# Patient Record
Sex: FEMALE | Race: WHITE | NOT HISPANIC OR LATINO | Employment: OTHER | URBAN - METROPOLITAN AREA
[De-identification: names, ages, dates, MRNs, and addresses within clinical notes are randomized per-mention and may not be internally consistent; named-entity substitution may affect disease eponyms.]

---

## 2017-01-13 ENCOUNTER — COMMUNICATION - HEALTHEAST (OUTPATIENT)
Dept: FAMILY MEDICINE | Facility: CLINIC | Age: 52
End: 2017-01-13

## 2017-01-13 DIAGNOSIS — G89.4 CHRONIC PAIN SYNDROME: ICD-10-CM

## 2017-02-09 ENCOUNTER — COMMUNICATION - HEALTHEAST (OUTPATIENT)
Dept: FAMILY MEDICINE | Facility: CLINIC | Age: 52
End: 2017-02-09

## 2017-02-09 DIAGNOSIS — G89.4 CHRONIC PAIN SYNDROME: ICD-10-CM

## 2017-02-18 ENCOUNTER — COMMUNICATION - HEALTHEAST (OUTPATIENT)
Dept: FAMILY MEDICINE | Facility: CLINIC | Age: 52
End: 2017-02-18

## 2017-02-18 DIAGNOSIS — R33.9 URINARY RETENTION: ICD-10-CM

## 2017-02-23 ENCOUNTER — COMMUNICATION - HEALTHEAST (OUTPATIENT)
Dept: FAMILY MEDICINE | Facility: CLINIC | Age: 52
End: 2017-02-23

## 2017-03-03 ENCOUNTER — COMMUNICATION - HEALTHEAST (OUTPATIENT)
Dept: FAMILY MEDICINE | Facility: CLINIC | Age: 52
End: 2017-03-03

## 2017-03-03 DIAGNOSIS — G89.4 CHRONIC PAIN SYNDROME: ICD-10-CM

## 2017-03-03 DIAGNOSIS — E03.9 HYPOTHYROIDISM, UNSPECIFIED TYPE: ICD-10-CM

## 2017-03-31 ENCOUNTER — COMMUNICATION - HEALTHEAST (OUTPATIENT)
Dept: FAMILY MEDICINE | Facility: CLINIC | Age: 52
End: 2017-03-31

## 2017-03-31 DIAGNOSIS — G89.4 CHRONIC PAIN SYNDROME: ICD-10-CM

## 2017-04-03 ENCOUNTER — COMMUNICATION - HEALTHEAST (OUTPATIENT)
Dept: FAMILY MEDICINE | Facility: CLINIC | Age: 52
End: 2017-04-03

## 2017-05-02 ENCOUNTER — OFFICE VISIT - HEALTHEAST (OUTPATIENT)
Dept: FAMILY MEDICINE | Facility: CLINIC | Age: 52
End: 2017-05-02

## 2017-05-02 ENCOUNTER — AMBULATORY - HEALTHEAST (OUTPATIENT)
Dept: FAMILY MEDICINE | Facility: CLINIC | Age: 52
End: 2017-05-02

## 2017-05-02 DIAGNOSIS — G89.4 CHRONIC PAIN SYNDROME: ICD-10-CM

## 2017-05-02 DIAGNOSIS — F32.0 MILD SINGLE CURRENT EPISODE OF MAJOR DEPRESSIVE DISORDER (H): ICD-10-CM

## 2017-05-02 DIAGNOSIS — N39.0 URINARY TRACT INFECTION, SITE NOT SPECIFIED: ICD-10-CM

## 2017-05-02 DIAGNOSIS — E03.9 HYPOTHYROIDISM, UNSPECIFIED TYPE: ICD-10-CM

## 2017-05-02 DIAGNOSIS — F41.9 ANXIETY: ICD-10-CM

## 2017-05-29 ENCOUNTER — COMMUNICATION - HEALTHEAST (OUTPATIENT)
Dept: FAMILY MEDICINE | Facility: CLINIC | Age: 52
End: 2017-05-29

## 2017-05-29 DIAGNOSIS — G89.4 CHRONIC PAIN SYNDROME: ICD-10-CM

## 2017-05-31 ENCOUNTER — COMMUNICATION - HEALTHEAST (OUTPATIENT)
Dept: FAMILY MEDICINE | Facility: CLINIC | Age: 52
End: 2017-05-31

## 2017-06-04 ENCOUNTER — COMMUNICATION - HEALTHEAST (OUTPATIENT)
Dept: FAMILY MEDICINE | Facility: CLINIC | Age: 52
End: 2017-06-04

## 2017-06-23 ENCOUNTER — COMMUNICATION - HEALTHEAST (OUTPATIENT)
Dept: FAMILY MEDICINE | Facility: CLINIC | Age: 52
End: 2017-06-23

## 2017-06-23 DIAGNOSIS — G89.4 CHRONIC PAIN SYNDROME: ICD-10-CM

## 2017-07-25 ENCOUNTER — COMMUNICATION - HEALTHEAST (OUTPATIENT)
Dept: FAMILY MEDICINE | Facility: CLINIC | Age: 52
End: 2017-07-25

## 2017-07-25 DIAGNOSIS — G89.4 CHRONIC PAIN SYNDROME: ICD-10-CM

## 2017-08-22 ENCOUNTER — COMMUNICATION - HEALTHEAST (OUTPATIENT)
Dept: FAMILY MEDICINE | Facility: CLINIC | Age: 52
End: 2017-08-22

## 2017-08-22 DIAGNOSIS — G89.4 CHRONIC PAIN SYNDROME: ICD-10-CM

## 2017-09-14 ENCOUNTER — COMMUNICATION - HEALTHEAST (OUTPATIENT)
Dept: FAMILY MEDICINE | Facility: CLINIC | Age: 52
End: 2017-09-14

## 2017-09-14 DIAGNOSIS — R33.9 URINARY RETENTION: ICD-10-CM

## 2017-09-19 ENCOUNTER — COMMUNICATION - HEALTHEAST (OUTPATIENT)
Dept: FAMILY MEDICINE | Facility: CLINIC | Age: 52
End: 2017-09-19

## 2017-09-19 DIAGNOSIS — G89.4 CHRONIC PAIN SYNDROME: ICD-10-CM

## 2017-10-17 ENCOUNTER — COMMUNICATION - HEALTHEAST (OUTPATIENT)
Dept: FAMILY MEDICINE | Facility: CLINIC | Age: 52
End: 2017-10-17

## 2017-10-17 DIAGNOSIS — G89.4 CHRONIC PAIN SYNDROME: ICD-10-CM

## 2017-11-01 ENCOUNTER — COMMUNICATION - HEALTHEAST (OUTPATIENT)
Dept: FAMILY MEDICINE | Facility: CLINIC | Age: 52
End: 2017-11-01

## 2017-11-01 DIAGNOSIS — E03.9 HYPOTHYROIDISM, UNSPECIFIED TYPE: ICD-10-CM

## 2017-11-01 DIAGNOSIS — E03.9 HYPOTHYROIDISM: ICD-10-CM

## 2017-11-14 ENCOUNTER — COMMUNICATION - HEALTHEAST (OUTPATIENT)
Dept: FAMILY MEDICINE | Facility: CLINIC | Age: 52
End: 2017-11-14

## 2017-11-14 DIAGNOSIS — G89.4 CHRONIC PAIN SYNDROME: ICD-10-CM

## 2017-11-25 ENCOUNTER — COMMUNICATION - HEALTHEAST (OUTPATIENT)
Dept: FAMILY MEDICINE | Facility: CLINIC | Age: 52
End: 2017-11-25

## 2017-11-25 DIAGNOSIS — F32.A DEPRESSION: ICD-10-CM

## 2017-12-08 ENCOUNTER — OFFICE VISIT - HEALTHEAST (OUTPATIENT)
Dept: FAMILY MEDICINE | Facility: CLINIC | Age: 52
End: 2017-12-08

## 2017-12-08 DIAGNOSIS — Z12.31 VISIT FOR SCREENING MAMMOGRAM: ICD-10-CM

## 2017-12-08 DIAGNOSIS — M25.571 RIGHT ANKLE PAIN: ICD-10-CM

## 2017-12-08 DIAGNOSIS — G89.4 CHRONIC PAIN SYNDROME: ICD-10-CM

## 2017-12-08 DIAGNOSIS — G82.20 PARAPLEGIA (H): ICD-10-CM

## 2017-12-08 DIAGNOSIS — F41.9 ANXIETY: ICD-10-CM

## 2017-12-08 DIAGNOSIS — Z12.11 SCREEN FOR COLON CANCER: ICD-10-CM

## 2017-12-08 DIAGNOSIS — E03.9 HYPOTHYROIDISM, UNSPECIFIED TYPE: ICD-10-CM

## 2017-12-30 ENCOUNTER — COMMUNICATION - HEALTHEAST (OUTPATIENT)
Dept: RADIOLOGY | Facility: CLINIC | Age: 52
End: 2017-12-30

## 2018-01-03 ENCOUNTER — COMMUNICATION - HEALTHEAST (OUTPATIENT)
Dept: FAMILY MEDICINE | Facility: CLINIC | Age: 53
End: 2018-01-03

## 2018-01-09 ENCOUNTER — COMMUNICATION - HEALTHEAST (OUTPATIENT)
Dept: FAMILY MEDICINE | Facility: CLINIC | Age: 53
End: 2018-01-09

## 2018-01-09 DIAGNOSIS — G89.4 CHRONIC PAIN SYNDROME: ICD-10-CM

## 2018-02-06 ENCOUNTER — COMMUNICATION - HEALTHEAST (OUTPATIENT)
Dept: FAMILY MEDICINE | Facility: CLINIC | Age: 53
End: 2018-02-06

## 2018-02-06 DIAGNOSIS — G89.4 CHRONIC PAIN SYNDROME: ICD-10-CM

## 2018-03-06 ENCOUNTER — COMMUNICATION - HEALTHEAST (OUTPATIENT)
Dept: FAMILY MEDICINE | Facility: CLINIC | Age: 53
End: 2018-03-06

## 2018-03-06 DIAGNOSIS — G89.4 CHRONIC PAIN SYNDROME: ICD-10-CM

## 2018-03-20 ENCOUNTER — COMMUNICATION - HEALTHEAST (OUTPATIENT)
Dept: FAMILY MEDICINE | Facility: CLINIC | Age: 53
End: 2018-03-20

## 2018-03-20 DIAGNOSIS — R33.9 URINARY RETENTION: ICD-10-CM

## 2018-04-04 ENCOUNTER — COMMUNICATION - HEALTHEAST (OUTPATIENT)
Dept: FAMILY MEDICINE | Facility: CLINIC | Age: 53
End: 2018-04-04

## 2018-04-04 DIAGNOSIS — G89.4 CHRONIC PAIN SYNDROME: ICD-10-CM

## 2018-04-23 ENCOUNTER — COMMUNICATION - HEALTHEAST (OUTPATIENT)
Dept: FAMILY MEDICINE | Facility: CLINIC | Age: 53
End: 2018-04-23

## 2018-04-23 DIAGNOSIS — F32.A DEPRESSION: ICD-10-CM

## 2018-04-27 ENCOUNTER — COMMUNICATION - HEALTHEAST (OUTPATIENT)
Dept: FAMILY MEDICINE | Facility: CLINIC | Age: 53
End: 2018-04-27

## 2018-04-27 DIAGNOSIS — G89.4 CHRONIC PAIN SYNDROME: ICD-10-CM

## 2018-05-04 ENCOUNTER — COMMUNICATION - HEALTHEAST (OUTPATIENT)
Dept: FAMILY MEDICINE | Facility: CLINIC | Age: 53
End: 2018-05-04

## 2018-05-04 DIAGNOSIS — E03.9 HYPOTHYROIDISM, UNSPECIFIED TYPE: ICD-10-CM

## 2018-05-25 ENCOUNTER — COMMUNICATION - HEALTHEAST (OUTPATIENT)
Dept: FAMILY MEDICINE | Facility: CLINIC | Age: 53
End: 2018-05-25

## 2018-05-25 DIAGNOSIS — G89.4 CHRONIC PAIN SYNDROME: ICD-10-CM

## 2018-06-12 ENCOUNTER — COMMUNICATION - HEALTHEAST (OUTPATIENT)
Dept: FAMILY MEDICINE | Facility: CLINIC | Age: 53
End: 2018-06-12

## 2018-06-22 ENCOUNTER — COMMUNICATION - HEALTHEAST (OUTPATIENT)
Dept: FAMILY MEDICINE | Facility: CLINIC | Age: 53
End: 2018-06-22

## 2018-06-22 DIAGNOSIS — G89.4 CHRONIC PAIN SYNDROME: ICD-10-CM

## 2018-07-09 ENCOUNTER — COMMUNICATION - HEALTHEAST (OUTPATIENT)
Dept: SCHEDULING | Facility: CLINIC | Age: 53
End: 2018-07-09

## 2018-07-11 ENCOUNTER — RECORDS - HEALTHEAST (OUTPATIENT)
Dept: ADMINISTRATIVE | Facility: OTHER | Age: 53
End: 2018-07-11

## 2018-07-17 ENCOUNTER — COMMUNICATION - HEALTHEAST (OUTPATIENT)
Dept: FAMILY MEDICINE | Facility: CLINIC | Age: 53
End: 2018-07-17

## 2018-07-19 ENCOUNTER — COMMUNICATION - HEALTHEAST (OUTPATIENT)
Dept: FAMILY MEDICINE | Facility: CLINIC | Age: 53
End: 2018-07-19

## 2018-07-19 DIAGNOSIS — F32.A DEPRESSION: ICD-10-CM

## 2018-07-24 ENCOUNTER — OFFICE VISIT - HEALTHEAST (OUTPATIENT)
Dept: FAMILY MEDICINE | Facility: CLINIC | Age: 53
End: 2018-07-24

## 2018-07-24 DIAGNOSIS — M80.00XA AGE-RELATED OSTEOPOROSIS WITH CURRENT PATHOLOGICAL FRACTURE: ICD-10-CM

## 2018-07-24 DIAGNOSIS — S82.141D TIBIAL PLATEAU FRACTURE, RIGHT, CLOSED, WITH ROUTINE HEALING, SUBSEQUENT ENCOUNTER: ICD-10-CM

## 2018-07-24 DIAGNOSIS — G89.4 CHRONIC PAIN SYNDROME: ICD-10-CM

## 2018-07-24 DIAGNOSIS — S72.92XA FEMUR FRACTURE, LEFT (H): ICD-10-CM

## 2018-07-24 LAB
AMPHETAMINES UR QL SCN: ABNORMAL
BARBITURATES UR QL: ABNORMAL
BENZODIAZ UR QL: ABNORMAL
CANNABINOIDS UR QL SCN: ABNORMAL
COCAINE UR QL: ABNORMAL
CREAT UR-MCNC: 108.1 MG/DL
METHADONE UR QL SCN: ABNORMAL
OPIATES UR QL SCN: ABNORMAL
OXYCODONE UR QL: ABNORMAL
PCP UR QL SCN: ABNORMAL

## 2018-07-26 ENCOUNTER — COMMUNICATION - HEALTHEAST (OUTPATIENT)
Dept: FAMILY MEDICINE | Facility: CLINIC | Age: 53
End: 2018-07-26

## 2018-07-26 DIAGNOSIS — F32.0 MILD SINGLE CURRENT EPISODE OF MAJOR DEPRESSIVE DISORDER (H): ICD-10-CM

## 2018-08-08 ENCOUNTER — COMMUNICATION - HEALTHEAST (OUTPATIENT)
Dept: FAMILY MEDICINE | Facility: CLINIC | Age: 53
End: 2018-08-08

## 2018-08-08 DIAGNOSIS — E03.9 HYPOTHYROIDISM, UNSPECIFIED TYPE: ICD-10-CM

## 2018-08-08 DIAGNOSIS — S72.92XA FEMUR FRACTURE, LEFT (H): ICD-10-CM

## 2018-08-08 DIAGNOSIS — G89.4 CHRONIC PAIN SYNDROME: ICD-10-CM

## 2018-08-08 DIAGNOSIS — S82.141D TIBIAL PLATEAU FRACTURE, RIGHT, CLOSED, WITH ROUTINE HEALING, SUBSEQUENT ENCOUNTER: ICD-10-CM

## 2018-08-21 ENCOUNTER — COMMUNICATION - HEALTHEAST (OUTPATIENT)
Dept: FAMILY MEDICINE | Facility: CLINIC | Age: 53
End: 2018-08-21

## 2018-08-21 DIAGNOSIS — G89.4 CHRONIC PAIN SYNDROME: ICD-10-CM

## 2018-08-24 ENCOUNTER — COMMUNICATION - HEALTHEAST (OUTPATIENT)
Dept: FAMILY MEDICINE | Facility: CLINIC | Age: 53
End: 2018-08-24

## 2018-08-24 DIAGNOSIS — G89.4 CHRONIC PAIN SYNDROME: ICD-10-CM

## 2018-08-24 DIAGNOSIS — S72.92XA FEMUR FRACTURE, LEFT (H): ICD-10-CM

## 2018-08-24 DIAGNOSIS — S82.141D TIBIAL PLATEAU FRACTURE, RIGHT, CLOSED, WITH ROUTINE HEALING, SUBSEQUENT ENCOUNTER: ICD-10-CM

## 2018-09-18 ENCOUNTER — COMMUNICATION - HEALTHEAST (OUTPATIENT)
Dept: FAMILY MEDICINE | Facility: CLINIC | Age: 53
End: 2018-09-18

## 2018-09-18 DIAGNOSIS — S82.141D TIBIAL PLATEAU FRACTURE, RIGHT, CLOSED, WITH ROUTINE HEALING, SUBSEQUENT ENCOUNTER: ICD-10-CM

## 2018-09-18 DIAGNOSIS — G89.4 CHRONIC PAIN SYNDROME: ICD-10-CM

## 2018-09-18 DIAGNOSIS — S72.92XA FEMUR FRACTURE, LEFT (H): ICD-10-CM

## 2018-09-19 ENCOUNTER — COMMUNICATION - HEALTHEAST (OUTPATIENT)
Dept: FAMILY MEDICINE | Facility: CLINIC | Age: 53
End: 2018-09-19

## 2018-09-19 DIAGNOSIS — S82.141D TIBIAL PLATEAU FRACTURE, RIGHT, CLOSED, WITH ROUTINE HEALING, SUBSEQUENT ENCOUNTER: ICD-10-CM

## 2018-09-19 DIAGNOSIS — S72.92XA FEMUR FRACTURE, LEFT (H): ICD-10-CM

## 2018-09-19 DIAGNOSIS — G89.4 CHRONIC PAIN SYNDROME: ICD-10-CM

## 2018-10-16 ENCOUNTER — COMMUNICATION - HEALTHEAST (OUTPATIENT)
Dept: FAMILY MEDICINE | Facility: CLINIC | Age: 53
End: 2018-10-16

## 2018-10-16 DIAGNOSIS — G89.4 CHRONIC PAIN SYNDROME: ICD-10-CM

## 2018-10-16 DIAGNOSIS — F32.A DEPRESSION: ICD-10-CM

## 2018-10-22 ENCOUNTER — COMMUNICATION - HEALTHEAST (OUTPATIENT)
Dept: FAMILY MEDICINE | Facility: CLINIC | Age: 53
End: 2018-10-22

## 2018-10-22 DIAGNOSIS — G89.4 CHRONIC PAIN SYNDROME: ICD-10-CM

## 2018-11-04 ENCOUNTER — COMMUNICATION - HEALTHEAST (OUTPATIENT)
Dept: FAMILY MEDICINE | Facility: CLINIC | Age: 53
End: 2018-11-04

## 2018-11-04 DIAGNOSIS — E03.9 HYPOTHYROIDISM, UNSPECIFIED TYPE: ICD-10-CM

## 2018-11-20 ENCOUNTER — COMMUNICATION - HEALTHEAST (OUTPATIENT)
Dept: FAMILY MEDICINE | Facility: CLINIC | Age: 53
End: 2018-11-20

## 2018-11-20 DIAGNOSIS — G89.4 CHRONIC PAIN SYNDROME: ICD-10-CM

## 2018-12-12 ENCOUNTER — OFFICE VISIT - HEALTHEAST (OUTPATIENT)
Dept: FAMILY MEDICINE | Facility: CLINIC | Age: 53
End: 2018-12-12

## 2018-12-12 DIAGNOSIS — D64.9 NORMOCHROMIC NORMOCYTIC ANEMIA: ICD-10-CM

## 2018-12-12 DIAGNOSIS — G89.4 CHRONIC PAIN SYNDROME: ICD-10-CM

## 2018-12-12 DIAGNOSIS — E87.6 HYPOKALEMIA: ICD-10-CM

## 2018-12-12 DIAGNOSIS — F32.0 CURRENT MILD EPISODE OF MAJOR DEPRESSIVE DISORDER WITHOUT PRIOR EPISODE (H): ICD-10-CM

## 2018-12-12 DIAGNOSIS — G82.20 PARAPLEGIA (H): ICD-10-CM

## 2018-12-12 DIAGNOSIS — Z12.11 SCREEN FOR COLON CANCER: ICD-10-CM

## 2018-12-12 DIAGNOSIS — E03.9 HYPOTHYROIDISM, UNSPECIFIED TYPE: ICD-10-CM

## 2018-12-12 DIAGNOSIS — Z12.31 VISIT FOR SCREENING MAMMOGRAM: ICD-10-CM

## 2018-12-12 LAB
ERYTHROCYTE [DISTWIDTH] IN BLOOD BY AUTOMATED COUNT: 13.8 % (ref 11–14.5)
HCT VFR BLD AUTO: 35.3 % (ref 35–47)
HGB BLD-MCNC: 12.1 G/DL (ref 12–16)
MCH RBC QN AUTO: 31.2 PG (ref 27–34)
MCHC RBC AUTO-ENTMCNC: 34.3 G/DL (ref 32–36)
MCV RBC AUTO: 91 FL (ref 80–100)
PLATELET # BLD AUTO: 293 THOU/UL (ref 140–440)
PMV BLD AUTO: 8.2 FL (ref 7–10)
RBC # BLD AUTO: 3.89 MILL/UL (ref 3.8–5.4)
WBC: 4.4 THOU/UL (ref 4–11)

## 2018-12-13 LAB
ANION GAP SERPL CALCULATED.3IONS-SCNC: 8 MMOL/L (ref 5–18)
BUN SERPL-MCNC: 15 MG/DL (ref 8–22)
CALCIUM SERPL-MCNC: 9.6 MG/DL (ref 8.5–10.5)
CHLORIDE BLD-SCNC: 105 MMOL/L (ref 98–107)
CO2 SERPL-SCNC: 26 MMOL/L (ref 22–31)
CREAT SERPL-MCNC: 0.66 MG/DL (ref 0.6–1.1)
GFR SERPL CREATININE-BSD FRML MDRD: >60 ML/MIN/1.73M2
GLUCOSE BLD-MCNC: 108 MG/DL (ref 70–125)
POTASSIUM BLD-SCNC: 4.7 MMOL/L (ref 3.5–5)
SODIUM SERPL-SCNC: 139 MMOL/L (ref 136–145)
TSH SERPL DL<=0.005 MIU/L-ACNC: 1.21 UIU/ML (ref 0.3–5)

## 2018-12-17 ENCOUNTER — COMMUNICATION - HEALTHEAST (OUTPATIENT)
Dept: FAMILY MEDICINE | Facility: CLINIC | Age: 53
End: 2018-12-17

## 2018-12-17 DIAGNOSIS — G89.4 CHRONIC PAIN SYNDROME: ICD-10-CM

## 2018-12-19 ENCOUNTER — COMMUNICATION - HEALTHEAST (OUTPATIENT)
Dept: RADIOLOGY | Facility: CLINIC | Age: 53
End: 2018-12-19

## 2018-12-28 ENCOUNTER — COMMUNICATION - HEALTHEAST (OUTPATIENT)
Dept: FAMILY MEDICINE | Facility: CLINIC | Age: 53
End: 2018-12-28

## 2018-12-28 DIAGNOSIS — R33.9 URINARY RETENTION: ICD-10-CM

## 2019-01-02 ENCOUNTER — COMMUNICATION - HEALTHEAST (OUTPATIENT)
Dept: FAMILY MEDICINE | Facility: CLINIC | Age: 54
End: 2019-01-02

## 2019-01-14 ENCOUNTER — COMMUNICATION - HEALTHEAST (OUTPATIENT)
Dept: FAMILY MEDICINE | Facility: CLINIC | Age: 54
End: 2019-01-14

## 2019-01-14 DIAGNOSIS — F32.0 CURRENT MILD EPISODE OF MAJOR DEPRESSIVE DISORDER WITHOUT PRIOR EPISODE (H): ICD-10-CM

## 2019-01-14 DIAGNOSIS — G89.4 CHRONIC PAIN SYNDROME: ICD-10-CM

## 2019-02-06 ENCOUNTER — COMMUNICATION - HEALTHEAST (OUTPATIENT)
Dept: FAMILY MEDICINE | Facility: CLINIC | Age: 54
End: 2019-02-06

## 2019-02-06 DIAGNOSIS — E03.9 HYPOTHYROIDISM, UNSPECIFIED TYPE: ICD-10-CM

## 2019-02-11 ENCOUNTER — COMMUNICATION - HEALTHEAST (OUTPATIENT)
Dept: FAMILY MEDICINE | Facility: CLINIC | Age: 54
End: 2019-02-11

## 2019-02-11 DIAGNOSIS — G89.4 CHRONIC PAIN SYNDROME: ICD-10-CM

## 2019-02-12 ENCOUNTER — COMMUNICATION - HEALTHEAST (OUTPATIENT)
Dept: FAMILY MEDICINE | Facility: CLINIC | Age: 54
End: 2019-02-12

## 2019-02-13 ENCOUNTER — COMMUNICATION - HEALTHEAST (OUTPATIENT)
Dept: FAMILY MEDICINE | Facility: CLINIC | Age: 54
End: 2019-02-13

## 2019-03-11 ENCOUNTER — COMMUNICATION - HEALTHEAST (OUTPATIENT)
Dept: FAMILY MEDICINE | Facility: CLINIC | Age: 54
End: 2019-03-11

## 2019-03-11 DIAGNOSIS — G89.4 CHRONIC PAIN SYNDROME: ICD-10-CM

## 2019-04-08 ENCOUNTER — COMMUNICATION - HEALTHEAST (OUTPATIENT)
Dept: FAMILY MEDICINE | Facility: CLINIC | Age: 54
End: 2019-04-08

## 2019-04-08 DIAGNOSIS — G89.4 CHRONIC PAIN SYNDROME: ICD-10-CM

## 2019-05-06 ENCOUNTER — COMMUNICATION - HEALTHEAST (OUTPATIENT)
Dept: FAMILY MEDICINE | Facility: CLINIC | Age: 54
End: 2019-05-06

## 2019-05-06 DIAGNOSIS — G89.4 CHRONIC PAIN SYNDROME: ICD-10-CM

## 2019-06-03 ENCOUNTER — COMMUNICATION - HEALTHEAST (OUTPATIENT)
Dept: FAMILY MEDICINE | Facility: CLINIC | Age: 54
End: 2019-06-03

## 2019-06-03 DIAGNOSIS — G89.4 CHRONIC PAIN SYNDROME: ICD-10-CM

## 2019-07-01 ENCOUNTER — COMMUNICATION - HEALTHEAST (OUTPATIENT)
Dept: FAMILY MEDICINE | Facility: CLINIC | Age: 54
End: 2019-07-01

## 2019-07-01 DIAGNOSIS — G89.4 CHRONIC PAIN SYNDROME: ICD-10-CM

## 2019-07-29 ENCOUNTER — COMMUNICATION - HEALTHEAST (OUTPATIENT)
Dept: FAMILY MEDICINE | Facility: CLINIC | Age: 54
End: 2019-07-29

## 2019-07-29 DIAGNOSIS — G89.4 CHRONIC PAIN SYNDROME: ICD-10-CM

## 2019-07-31 ENCOUNTER — COMMUNICATION - HEALTHEAST (OUTPATIENT)
Dept: FAMILY MEDICINE | Facility: CLINIC | Age: 54
End: 2019-07-31

## 2019-07-31 DIAGNOSIS — G89.4 CHRONIC PAIN SYNDROME: ICD-10-CM

## 2019-08-27 ENCOUNTER — COMMUNICATION - HEALTHEAST (OUTPATIENT)
Dept: FAMILY MEDICINE | Facility: CLINIC | Age: 54
End: 2019-08-27

## 2019-08-27 DIAGNOSIS — G89.4 CHRONIC PAIN SYNDROME: ICD-10-CM

## 2019-08-29 ENCOUNTER — COMMUNICATION - HEALTHEAST (OUTPATIENT)
Dept: FAMILY MEDICINE | Facility: CLINIC | Age: 54
End: 2019-08-29

## 2019-09-06 ENCOUNTER — COMMUNICATION - HEALTHEAST (OUTPATIENT)
Dept: FAMILY MEDICINE | Facility: CLINIC | Age: 54
End: 2019-09-06

## 2019-09-06 DIAGNOSIS — G89.4 CHRONIC PAIN SYNDROME: ICD-10-CM

## 2019-09-08 ENCOUNTER — AMBULATORY - HEALTHEAST (OUTPATIENT)
Dept: FAMILY MEDICINE | Facility: CLINIC | Age: 54
End: 2019-09-08

## 2019-09-08 DIAGNOSIS — G89.4 CHRONIC PAIN SYNDROME: ICD-10-CM

## 2019-09-09 ENCOUNTER — COMMUNICATION - HEALTHEAST (OUTPATIENT)
Dept: FAMILY MEDICINE | Facility: CLINIC | Age: 54
End: 2019-09-09

## 2019-09-24 ENCOUNTER — OFFICE VISIT - HEALTHEAST (OUTPATIENT)
Dept: FAMILY MEDICINE | Facility: CLINIC | Age: 54
End: 2019-09-24

## 2019-09-24 DIAGNOSIS — G82.20 PARAPLEGIA (H): ICD-10-CM

## 2019-09-24 DIAGNOSIS — G89.4 CHRONIC PAIN SYNDROME: ICD-10-CM

## 2019-09-24 DIAGNOSIS — F41.9 ANXIETY: ICD-10-CM

## 2019-09-24 DIAGNOSIS — E03.9 HYPOTHYROIDISM, UNSPECIFIED TYPE: ICD-10-CM

## 2019-09-24 DIAGNOSIS — M80.00XD AGE-RELATED OSTEOPOROSIS WITH CURRENT PATHOLOGICAL FRACTURE WITH ROUTINE HEALING, SUBSEQUENT ENCOUNTER: ICD-10-CM

## 2019-09-24 DIAGNOSIS — F32.0 CURRENT MILD EPISODE OF MAJOR DEPRESSIVE DISORDER WITHOUT PRIOR EPISODE (H): ICD-10-CM

## 2019-09-24 ASSESSMENT — PATIENT HEALTH QUESTIONNAIRE - PHQ9: SUM OF ALL RESPONSES TO PHQ QUESTIONS 1-9: 2

## 2019-09-25 ASSESSMENT — ANXIETY QUESTIONNAIRES
3. WORRYING TOO MUCH ABOUT DIFFERENT THINGS: NOT AT ALL
2. NOT BEING ABLE TO STOP OR CONTROL WORRYING: NOT AT ALL
7. FEELING AFRAID AS IF SOMETHING AWFUL MIGHT HAPPEN: NOT AT ALL
1. FEELING NERVOUS, ANXIOUS, OR ON EDGE: NOT AT ALL
GAD7 TOTAL SCORE: 0
4. TROUBLE RELAXING: NOT AT ALL
5. BEING SO RESTLESS THAT IT IS HARD TO SIT STILL: NOT AT ALL
6. BECOMING EASILY ANNOYED OR IRRITABLE: NOT AT ALL
IF YOU CHECKED OFF ANY PROBLEMS ON THIS QUESTIONNAIRE, HOW DIFFICULT HAVE THESE PROBLEMS MADE IT FOR YOU TO DO YOUR WORK, TAKE CARE OF THINGS AT HOME, OR GET ALONG WITH OTHER PEOPLE: NOT DIFFICULT AT ALL

## 2019-10-22 ENCOUNTER — COMMUNICATION - HEALTHEAST (OUTPATIENT)
Dept: FAMILY MEDICINE | Facility: CLINIC | Age: 54
End: 2019-10-22

## 2019-10-22 ENCOUNTER — AMBULATORY - HEALTHEAST (OUTPATIENT)
Dept: FAMILY MEDICINE | Facility: CLINIC | Age: 54
End: 2019-10-22

## 2019-10-22 DIAGNOSIS — G89.4 CHRONIC PAIN SYNDROME: ICD-10-CM

## 2019-11-12 ENCOUNTER — COMMUNICATION - HEALTHEAST (OUTPATIENT)
Dept: FAMILY MEDICINE | Facility: CLINIC | Age: 54
End: 2019-11-12

## 2019-11-19 ENCOUNTER — COMMUNICATION - HEALTHEAST (OUTPATIENT)
Dept: FAMILY MEDICINE | Facility: CLINIC | Age: 54
End: 2019-11-19

## 2019-11-19 DIAGNOSIS — G89.4 CHRONIC PAIN SYNDROME: ICD-10-CM

## 2019-12-12 ENCOUNTER — COMMUNICATION - HEALTHEAST (OUTPATIENT)
Dept: FAMILY MEDICINE | Facility: CLINIC | Age: 54
End: 2019-12-12

## 2019-12-12 DIAGNOSIS — F32.0 CURRENT MILD EPISODE OF MAJOR DEPRESSIVE DISORDER WITHOUT PRIOR EPISODE (H): ICD-10-CM

## 2019-12-16 ENCOUNTER — COMMUNICATION - HEALTHEAST (OUTPATIENT)
Dept: FAMILY MEDICINE | Facility: CLINIC | Age: 54
End: 2019-12-16

## 2019-12-16 DIAGNOSIS — G89.4 CHRONIC PAIN SYNDROME: ICD-10-CM

## 2019-12-17 ENCOUNTER — COMMUNICATION - HEALTHEAST (OUTPATIENT)
Dept: RADIOLOGY | Facility: CLINIC | Age: 54
End: 2019-12-17

## 2019-12-17 DIAGNOSIS — Z29.89 NEED FOR PROPHYLAXIS AGAINST URINARY TRACT INFECTION: ICD-10-CM

## 2019-12-17 DIAGNOSIS — N39.0 URINARY TRACT INFECTION WITHOUT HEMATURIA, SITE UNSPECIFIED: ICD-10-CM

## 2019-12-19 ENCOUNTER — COMMUNICATION - HEALTHEAST (OUTPATIENT)
Dept: FAMILY MEDICINE | Facility: CLINIC | Age: 54
End: 2019-12-19

## 2019-12-19 DIAGNOSIS — E03.9 HYPOTHYROIDISM, UNSPECIFIED TYPE: ICD-10-CM

## 2020-01-05 ENCOUNTER — COMMUNICATION - HEALTHEAST (OUTPATIENT)
Dept: FAMILY MEDICINE | Facility: CLINIC | Age: 55
End: 2020-01-05

## 2020-01-05 DIAGNOSIS — R33.9 URINARY RETENTION: ICD-10-CM

## 2020-01-13 ENCOUNTER — COMMUNICATION - HEALTHEAST (OUTPATIENT)
Dept: FAMILY MEDICINE | Facility: CLINIC | Age: 55
End: 2020-01-13

## 2020-01-13 DIAGNOSIS — G89.4 CHRONIC PAIN SYNDROME: ICD-10-CM

## 2020-01-14 ENCOUNTER — COMMUNICATION - HEALTHEAST (OUTPATIENT)
Dept: FAMILY MEDICINE | Facility: CLINIC | Age: 55
End: 2020-01-14

## 2020-01-14 DIAGNOSIS — G89.4 CHRONIC PAIN SYNDROME: ICD-10-CM

## 2020-02-11 ENCOUNTER — COMMUNICATION - HEALTHEAST (OUTPATIENT)
Dept: FAMILY MEDICINE | Facility: CLINIC | Age: 55
End: 2020-02-11

## 2020-02-11 DIAGNOSIS — G89.4 CHRONIC PAIN SYNDROME: ICD-10-CM

## 2020-03-09 ENCOUNTER — COMMUNICATION - HEALTHEAST (OUTPATIENT)
Dept: FAMILY MEDICINE | Facility: CLINIC | Age: 55
End: 2020-03-09

## 2020-03-09 DIAGNOSIS — Z79.2 PROPHYLACTIC ANTIBIOTIC: ICD-10-CM

## 2020-03-09 DIAGNOSIS — G89.4 CHRONIC PAIN SYNDROME: ICD-10-CM

## 2020-04-07 ENCOUNTER — COMMUNICATION - HEALTHEAST (OUTPATIENT)
Dept: FAMILY MEDICINE | Facility: CLINIC | Age: 55
End: 2020-04-07

## 2020-04-07 DIAGNOSIS — G89.4 CHRONIC PAIN SYNDROME: ICD-10-CM

## 2020-04-14 ENCOUNTER — COMMUNICATION - HEALTHEAST (OUTPATIENT)
Dept: FAMILY MEDICINE | Facility: CLINIC | Age: 55
End: 2020-04-14

## 2020-05-04 ENCOUNTER — COMMUNICATION - HEALTHEAST (OUTPATIENT)
Dept: FAMILY MEDICINE | Facility: CLINIC | Age: 55
End: 2020-05-04

## 2020-05-04 DIAGNOSIS — G89.4 CHRONIC PAIN SYNDROME: ICD-10-CM

## 2020-06-01 ENCOUNTER — OFFICE VISIT - HEALTHEAST (OUTPATIENT)
Dept: FAMILY MEDICINE | Facility: CLINIC | Age: 55
End: 2020-06-01

## 2020-06-01 ENCOUNTER — COMMUNICATION - HEALTHEAST (OUTPATIENT)
Dept: FAMILY MEDICINE | Facility: CLINIC | Age: 55
End: 2020-06-01

## 2020-06-01 DIAGNOSIS — G89.4 CHRONIC PAIN SYNDROME: ICD-10-CM

## 2020-06-01 DIAGNOSIS — N39.42 URINARY INCONTINENCE WITHOUT SENSORY AWARENESS: ICD-10-CM

## 2020-06-08 ENCOUNTER — COMMUNICATION - HEALTHEAST (OUTPATIENT)
Dept: FAMILY MEDICINE | Facility: CLINIC | Age: 55
End: 2020-06-08

## 2020-06-09 ENCOUNTER — OFFICE VISIT - HEALTHEAST (OUTPATIENT)
Dept: FAMILY MEDICINE | Facility: CLINIC | Age: 55
End: 2020-06-09

## 2020-06-09 DIAGNOSIS — Z11.9 SCREENING EXAMINATION FOR INFECTIOUS DISEASE: ICD-10-CM

## 2020-06-09 DIAGNOSIS — G89.4 CHRONIC PAIN SYNDROME: ICD-10-CM

## 2020-06-09 DIAGNOSIS — N39.42 URINARY INCONTINENCE WITHOUT SENSORY AWARENESS: ICD-10-CM

## 2020-06-09 DIAGNOSIS — G82.20 PARAPLEGIA (H): ICD-10-CM

## 2020-06-09 ASSESSMENT — ANXIETY QUESTIONNAIRES
7. FEELING AFRAID AS IF SOMETHING AWFUL MIGHT HAPPEN: NOT AT ALL
6. BECOMING EASILY ANNOYED OR IRRITABLE: NOT AT ALL
GAD7 TOTAL SCORE: 0
4. TROUBLE RELAXING: NOT AT ALL
1. FEELING NERVOUS, ANXIOUS, OR ON EDGE: NOT AT ALL
IF YOU CHECKED OFF ANY PROBLEMS ON THIS QUESTIONNAIRE, HOW DIFFICULT HAVE THESE PROBLEMS MADE IT FOR YOU TO DO YOUR WORK, TAKE CARE OF THINGS AT HOME, OR GET ALONG WITH OTHER PEOPLE: NOT DIFFICULT AT ALL
3. WORRYING TOO MUCH ABOUT DIFFERENT THINGS: NOT AT ALL
5. BEING SO RESTLESS THAT IT IS HARD TO SIT STILL: NOT AT ALL
2. NOT BEING ABLE TO STOP OR CONTROL WORRYING: NOT AT ALL

## 2020-06-09 ASSESSMENT — PATIENT HEALTH QUESTIONNAIRE - PHQ9: SUM OF ALL RESPONSES TO PHQ QUESTIONS 1-9: 0

## 2020-06-29 ENCOUNTER — COMMUNICATION - HEALTHEAST (OUTPATIENT)
Dept: FAMILY MEDICINE | Facility: CLINIC | Age: 55
End: 2020-06-29

## 2020-06-29 DIAGNOSIS — G89.4 CHRONIC PAIN SYNDROME: ICD-10-CM

## 2020-07-27 ENCOUNTER — COMMUNICATION - HEALTHEAST (OUTPATIENT)
Dept: FAMILY MEDICINE | Facility: CLINIC | Age: 55
End: 2020-07-27

## 2020-07-27 DIAGNOSIS — G89.4 CHRONIC PAIN SYNDROME: ICD-10-CM

## 2020-08-18 ENCOUNTER — COMMUNICATION - HEALTHEAST (OUTPATIENT)
Dept: FAMILY MEDICINE | Facility: CLINIC | Age: 55
End: 2020-08-18

## 2020-08-18 DIAGNOSIS — G89.4 CHRONIC PAIN SYNDROME: ICD-10-CM

## 2020-08-19 RX ORDER — BACLOFEN 20 MG/1
TABLET ORAL
Qty: 180 TABLET | Refills: 3 | Status: SHIPPED | OUTPATIENT
Start: 2020-08-19 | End: 2021-09-24

## 2020-08-24 ENCOUNTER — COMMUNICATION - HEALTHEAST (OUTPATIENT)
Dept: FAMILY MEDICINE | Facility: CLINIC | Age: 55
End: 2020-08-24

## 2020-08-24 DIAGNOSIS — G89.4 CHRONIC PAIN SYNDROME: ICD-10-CM

## 2020-09-21 ENCOUNTER — COMMUNICATION - HEALTHEAST (OUTPATIENT)
Dept: FAMILY MEDICINE | Facility: CLINIC | Age: 55
End: 2020-09-21

## 2020-09-21 DIAGNOSIS — G89.4 CHRONIC PAIN SYNDROME: ICD-10-CM

## 2020-10-05 ENCOUNTER — COMMUNICATION - HEALTHEAST (OUTPATIENT)
Dept: FAMILY MEDICINE | Facility: CLINIC | Age: 55
End: 2020-10-05

## 2020-10-05 DIAGNOSIS — F32.0 CURRENT MILD EPISODE OF MAJOR DEPRESSIVE DISORDER WITHOUT PRIOR EPISODE (H): ICD-10-CM

## 2020-10-19 ENCOUNTER — COMMUNICATION - HEALTHEAST (OUTPATIENT)
Dept: FAMILY MEDICINE | Facility: CLINIC | Age: 55
End: 2020-10-19

## 2020-10-19 DIAGNOSIS — G89.4 CHRONIC PAIN SYNDROME: ICD-10-CM

## 2020-11-16 ENCOUNTER — COMMUNICATION - HEALTHEAST (OUTPATIENT)
Dept: FAMILY MEDICINE | Facility: CLINIC | Age: 55
End: 2020-11-16

## 2020-11-16 DIAGNOSIS — G89.4 CHRONIC PAIN SYNDROME: ICD-10-CM

## 2020-12-14 ENCOUNTER — COMMUNICATION - HEALTHEAST (OUTPATIENT)
Dept: FAMILY MEDICINE | Facility: CLINIC | Age: 55
End: 2020-12-14

## 2020-12-14 DIAGNOSIS — G89.4 CHRONIC PAIN SYNDROME: ICD-10-CM

## 2020-12-22 ENCOUNTER — COMMUNICATION - HEALTHEAST (OUTPATIENT)
Dept: FAMILY MEDICINE | Facility: CLINIC | Age: 55
End: 2020-12-22

## 2020-12-22 DIAGNOSIS — R33.9 URINARY RETENTION: ICD-10-CM

## 2020-12-22 DIAGNOSIS — Z29.89 NEED FOR PROPHYLAXIS AGAINST URINARY TRACT INFECTION: ICD-10-CM

## 2021-01-11 ENCOUNTER — COMMUNICATION - HEALTHEAST (OUTPATIENT)
Dept: FAMILY MEDICINE | Facility: CLINIC | Age: 56
End: 2021-01-11

## 2021-01-11 DIAGNOSIS — G89.4 CHRONIC PAIN SYNDROME: ICD-10-CM

## 2021-01-12 ENCOUNTER — OFFICE VISIT - HEALTHEAST (OUTPATIENT)
Dept: FAMILY MEDICINE | Facility: CLINIC | Age: 56
End: 2021-01-12

## 2021-01-12 DIAGNOSIS — F41.9 ANXIETY: ICD-10-CM

## 2021-01-12 DIAGNOSIS — G82.20 PARAPLEGIA (H): ICD-10-CM

## 2021-01-12 DIAGNOSIS — F32.0 CURRENT MILD EPISODE OF MAJOR DEPRESSIVE DISORDER WITHOUT PRIOR EPISODE (H): ICD-10-CM

## 2021-01-12 DIAGNOSIS — G89.4 CHRONIC PAIN SYNDROME: ICD-10-CM

## 2021-01-12 ASSESSMENT — ANXIETY QUESTIONNAIRES
IF YOU CHECKED OFF ANY PROBLEMS ON THIS QUESTIONNAIRE, HOW DIFFICULT HAVE THESE PROBLEMS MADE IT FOR YOU TO DO YOUR WORK, TAKE CARE OF THINGS AT HOME, OR GET ALONG WITH OTHER PEOPLE: NOT DIFFICULT AT ALL
4. TROUBLE RELAXING: NOT AT ALL
2. NOT BEING ABLE TO STOP OR CONTROL WORRYING: NOT AT ALL
3. WORRYING TOO MUCH ABOUT DIFFERENT THINGS: NOT AT ALL
6. BECOMING EASILY ANNOYED OR IRRITABLE: NOT AT ALL
GAD7 TOTAL SCORE: 0
1. FEELING NERVOUS, ANXIOUS, OR ON EDGE: NOT AT ALL
7. FEELING AFRAID AS IF SOMETHING AWFUL MIGHT HAPPEN: NOT AT ALL
5. BEING SO RESTLESS THAT IT IS HARD TO SIT STILL: NOT AT ALL

## 2021-01-12 ASSESSMENT — PATIENT HEALTH QUESTIONNAIRE - PHQ9: SUM OF ALL RESPONSES TO PHQ QUESTIONS 1-9: 2

## 2021-01-15 ENCOUNTER — COMMUNICATION - HEALTHEAST (OUTPATIENT)
Dept: SCHEDULING | Facility: CLINIC | Age: 56
End: 2021-01-15

## 2021-02-12 ENCOUNTER — COMMUNICATION - HEALTHEAST (OUTPATIENT)
Dept: FAMILY MEDICINE | Facility: CLINIC | Age: 56
End: 2021-02-12

## 2021-02-12 DIAGNOSIS — G89.4 CHRONIC PAIN SYNDROME: ICD-10-CM

## 2021-03-12 ENCOUNTER — COMMUNICATION - HEALTHEAST (OUTPATIENT)
Dept: FAMILY MEDICINE | Facility: CLINIC | Age: 56
End: 2021-03-12

## 2021-03-12 DIAGNOSIS — G89.4 CHRONIC PAIN SYNDROME: ICD-10-CM

## 2021-03-15 ENCOUNTER — AMBULATORY - HEALTHEAST (OUTPATIENT)
Dept: NURSING | Facility: CLINIC | Age: 56
End: 2021-03-15

## 2021-04-04 ENCOUNTER — COMMUNICATION - HEALTHEAST (OUTPATIENT)
Dept: FAMILY MEDICINE | Facility: CLINIC | Age: 56
End: 2021-04-04

## 2021-04-04 DIAGNOSIS — E03.9 HYPOTHYROIDISM, UNSPECIFIED TYPE: ICD-10-CM

## 2021-04-05 RX ORDER — LEVOTHYROXINE SODIUM 75 UG/1
TABLET ORAL
Qty: 90 TABLET | Refills: 3 | Status: SHIPPED | OUTPATIENT
Start: 2021-04-05 | End: 2022-05-03

## 2021-04-09 ENCOUNTER — AMBULATORY - HEALTHEAST (OUTPATIENT)
Dept: NURSING | Facility: CLINIC | Age: 56
End: 2021-04-09

## 2021-04-09 ENCOUNTER — COMMUNICATION - HEALTHEAST (OUTPATIENT)
Dept: FAMILY MEDICINE | Facility: CLINIC | Age: 56
End: 2021-04-09

## 2021-04-09 DIAGNOSIS — Z23 ENCOUNTER FOR IMMUNIZATION: ICD-10-CM

## 2021-04-09 DIAGNOSIS — G89.4 CHRONIC PAIN SYNDROME: ICD-10-CM

## 2021-04-12 ENCOUNTER — AMBULATORY - HEALTHEAST (OUTPATIENT)
Dept: NURSING | Facility: CLINIC | Age: 56
End: 2021-04-12

## 2021-04-12 DIAGNOSIS — Z23 ENCOUNTER FOR IMMUNIZATION: ICD-10-CM

## 2021-05-06 ENCOUNTER — COMMUNICATION - HEALTHEAST (OUTPATIENT)
Dept: FAMILY MEDICINE | Facility: CLINIC | Age: 56
End: 2021-05-06

## 2021-05-06 DIAGNOSIS — G89.4 CHRONIC PAIN SYNDROME: ICD-10-CM

## 2021-05-26 ENCOUNTER — RECORDS - HEALTHEAST (OUTPATIENT)
Dept: ADMINISTRATIVE | Facility: CLINIC | Age: 56
End: 2021-05-26

## 2021-05-26 VITALS — OXYGEN SATURATION: 99 % | HEART RATE: 94 BPM | DIASTOLIC BLOOD PRESSURE: 70 MMHG | SYSTOLIC BLOOD PRESSURE: 110 MMHG

## 2021-05-26 ASSESSMENT — PATIENT HEALTH QUESTIONNAIRE - PHQ9: SUM OF ALL RESPONSES TO PHQ QUESTIONS 1-9: 2

## 2021-05-27 ENCOUNTER — RECORDS - HEALTHEAST (OUTPATIENT)
Dept: ADMINISTRATIVE | Facility: CLINIC | Age: 56
End: 2021-05-27

## 2021-05-27 ASSESSMENT — PATIENT HEALTH QUESTIONNAIRE - PHQ9
SUM OF ALL RESPONSES TO PHQ QUESTIONS 1-9: 2
SUM OF ALL RESPONSES TO PHQ QUESTIONS 1-9: 0

## 2021-05-27 NOTE — TELEPHONE ENCOUNTER
Controlled Substance Refill Request  Medication Name:   Requested Prescriptions     Pending Prescriptions Disp Refills     oxyCODONE-acetaminophen (PERCOCET/ENDOCET) 5-325 mg per tablet 240 tablet 0     Sig: Take 1-2 tablets by mouth every 6 (six) hours as needed for pain. 1 or 2 every 6 hours ( max of 8 in 24 hours)     fentaNYL (DURAGESIC) 25 mcg/hr 10 patch 0     Sig: APPLY 1 PATCH ON THE SKIN EVERY 3RD DAY     Date Last Fill: 3/11/19  Pharmacy: [facility]      Submit electronically to pharmacy  Controlled Substance Agreement Date Scanned:   Encounter-Level CSA Scan Date - 02/04/2016:    Scan on 2/4/2016: Controlled Substance (below)         Last office visit with prescriber/PCP: 12/12/2018 Brayden Mahmood MD OR same dept: 12/12/2018 Brayden Mahmood MD OR same specialty: 12/12/2018 Brayden Mahmood MD  Last physical: Visit date not found Last MTM visit: Visit date not found

## 2021-05-28 ASSESSMENT — ANXIETY QUESTIONNAIRES
GAD7 TOTAL SCORE: 0

## 2021-05-28 NOTE — TELEPHONE ENCOUNTER
Controlled Substance Refill Request  Medication Name:   Requested Prescriptions     Pending Prescriptions Disp Refills     oxyCODONE-acetaminophen (PERCOCET/ENDOCET) 5-325 mg per tablet 240 tablet 0     Sig: Take 1-2 tablets by mouth every 6 (six) hours as needed for pain. 1 or 2 every 6 hours ( max of 8 in 24 hours)     fentaNYL (DURAGESIC) 25 mcg/hr 10 patch 0     Sig: APPLY 1 PATCH ON THE SKIN EVERY 3RD DAY     Date Last Fill: 4/8/19  Pharmacy: [facility]      Submit electronically to pharmacy  Controlled Substance Agreement Date Scanned:   Encounter-Level CSA Scan Date - 02/04/2016:    Scan on 2/4/2016: Controlled Substance (below)         Last office visit with prescriber/PCP: 12/12/2018 Brayden Mahmood MD OR same dept: 12/12/2018 Brayden Mahmood MD OR same specialty: 12/12/2018 Brayden Mahmood MD  Last physical: Visit date not found Last MTM visit: Visit date not found

## 2021-05-29 NOTE — TELEPHONE ENCOUNTER
Controlled Substance Refill Request  Medication Name:   Requested Prescriptions     Pending Prescriptions Disp Refills     oxyCODONE-acetaminophen (PERCOCET/ENDOCET) 5-325 mg per tablet 240 tablet 0     Sig: Take 1-2 tablets by mouth every 6 (six) hours as needed for pain. 1 or 2 every 6 hours ( max of 8 in 24 hours)     fentaNYL (DURAGESIC) 25 mcg/hr 10 patch 0     Sig: APPLY 1 PATCH ON THE SKIN EVERY 3RD DAY     Date Last Fill: 05/06/19  Pharmacy: [facility] [address] [place] [address] [facility]MELO YAO AT [facility]  Submit electronically to pharmacy  Controlled Substance Agreement Date Scanned:   Encounter-Level CSA Scan Date - 02/04/2016:    Scan on 2/4/2016: Controlled Substance (below)         Last office visit with prescriber/PCP: 12/12/2018 Brayden Mahmood MD OR same dept: 12/12/2018 Brayden Mahmood MD OR same specialty: 12/12/2018 Brayden Mahmood MD  Last physical: Visit date not found Last MTM visit: Visit date not found

## 2021-05-30 ENCOUNTER — RECORDS - HEALTHEAST (OUTPATIENT)
Dept: ADMINISTRATIVE | Facility: CLINIC | Age: 56
End: 2021-05-30

## 2021-05-30 NOTE — TELEPHONE ENCOUNTER
Controlled Substance Refill Request  Medication Name:   Requested Prescriptions     Pending Prescriptions Disp Refills     oxyCODONE-acetaminophen (PERCOCET/ENDOCET) 5-325 mg per tablet 240 tablet 0     Sig: Take 1-2 tablets by mouth every 6 (six) hours as needed for pain. 1 or 2 every 6 hours ( max of 8 in 24 hours)     fentaNYL (DURAGESIC) 25 mcg/hr 10 patch 0     Sig: APPLY 1 PATCH ON THE SKIN EVERY 3RD DAY     Date Last Fill: 6/3/2019   [facility] [address] [place] [address]       Submit electronically to pharmacy  Controlled Substance Agreement Date Scanned:   Encounter-Level CSA Scan Date - 02/04/2016:    Scan on 2/4/2016: Controlled Substance (below)         Last office visit with prescriber/PCP: 12/12/2018 Brayden Mahmood MD OR same dept: 12/12/2018 Brayden Mahmood MD OR same specialty: 12/12/2018 Brayden Mahmood MD  Last physical: Visit date not found Last MTM visit: Visit date not found

## 2021-05-30 NOTE — TELEPHONE ENCOUNTER
Controlled Substance Refill Request  Medication Name:   Requested Prescriptions     Pending Prescriptions Disp Refills     fentaNYL (DURAGESIC) 25 mcg/hr 10 patch 0     Sig: APPLY 1 PATCH ON THE SKIN EVERY 3RD DAY     oxyCODONE-acetaminophen (PERCOCET/ENDOCET) 5-325 mg per tablet 240 tablet 0     Sig: Take 1-2 tablets by mouth every 6 (six) hours as needed for pain. 1 or 2 every 6 hours ( max of 8 in 24 hours)     Date Last Fill: 7/1/19  Pharmacy: [facility]      Submit electronically to pharmacy  Controlled Substance Agreement Date Scanned:   Encounter-Level CSA Scan Date - 02/04/2016:    Scan on 2/4/2016: Controlled Substance (below)         Last office visit with prescriber/PCP: 12/12/2018 Brayden Mahmood MD OR same dept: 12/12/2018 Brayden Mahmood MD OR same specialty: 12/12/2018 Brayden Mahmood MD  Last physical: Visit date not found Last MTM visit: Visit date not found

## 2021-05-31 NOTE — TELEPHONE ENCOUNTER
RN cannot approve Refill Request    RN can NOT refill this medication med is not covered by policy/route to provider. Last office visit: 12/12/2018 Brayden Mahmood MD Last Physical: Visit date not found Last MTM visit: Visit date not found Last visit same specialty: 12/12/2018 Brayden Mahmood MD.  Next visit within 3 mo: Visit date not found  Next physical within 3 mo: Visit date not found  Last refill 6/12/2018 for 180/3  Last OV 12/12/2018    Milka Delgado, [facility] Triage/Med Refill 7/31/2019    Requested Prescriptions   Pending Prescriptions Disp Refills     baclofen (LIORESAL) 20 MG tablet [Pharmacy Med Name: BACLOFEN 20MG TABLETS] 180 tablet 0     Sig: TAKE 1 TABLET(20 MG) BY MOUTH TWICE DAILY       There is no refill protocol information for this order

## 2021-05-31 NOTE — TELEPHONE ENCOUNTER
Controlled Substance Refill Request  Medication Name:   Requested Prescriptions     Pending Prescriptions Disp Refills     fentaNYL (DURAGESIC) 25 mcg/hr 10 patch 0     Sig: APPLY 1 PATCH ON THE SKIN EVERY 3RD DAY     oxyCODONE-acetaminophen (PERCOCET/ENDOCET) 5-325 mg per tablet 240 tablet 0     Sig: Take 1-2 tablets by mouth every 6 (six) hours as needed for pain. 1 or 2 every 6 hours ( max of 8 in 24 hours)     Date Last Fill: both 7/29/2019  [facility]      Submit electronically to pharmacy  Controlled Substance Agreement Date Scanned:   Encounter-Level CSA Scan Date - 02/04/2016:    Scan on 2/4/2016: Controlled Substance (below)         Last office visit with prescriber/PCP: 12/12/2018 Brayden Mahmood MD OR same dept: 12/12/2018 Brayden Mahmood MD OR same specialty: 12/12/2018 Brayden Mahmood MD  Last physical: Visit date not found Last MTM visit: Visit date not found

## 2021-06-01 ENCOUNTER — RECORDS - HEALTHEAST (OUTPATIENT)
Dept: ADMINISTRATIVE | Facility: CLINIC | Age: 56
End: 2021-06-01
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
## 2021-06-01 NOTE — TELEPHONE ENCOUNTER
Central PA team  [phone]  Pool: HE PA MED ([id number])          PA has been initiated.       PA form completed and faxed insurance via Cover My Meds     Key:  [id number]       Medication:  oxyCODONE-acetaminophen (PERCOCET/ENDOCET) 5-325 mg per tablet    Insurance:  [employer]         Response will be received via fax and may take up to 5-10 business days depending on plan

## 2021-06-01 NOTE — TELEPHONE ENCOUNTER
Controlled Substance Refill Request  Medication:   Requested Prescriptions     Pending Prescriptions Disp Refills     fentaNYL (DURAGESIC) 25 mcg/hr 5 patch 0     Sig: APPLY 1 PATCH ON THE SKIN EVERY 3RD DAY     oxyCODONE-acetaminophen (PERCOCET/ENDOCET) 5-325 mg per tablet 120 tablet 0     Sig: Take 1-2 tablets by mouth every 6 (six) hours as needed for pain. 1 or 2 every 6 hours ( max of 8 in 24 hours)     Date Last Fill: 8/27/19  [id number]   Submit electronically to pharmacy  Controlled Substance Agreement on File:   Encounter-Level CSA Scan Date - 02/04/2016:    Scan on 2/4/2016: Controlled Substance (below)         Last office visit: Last office visit pertaining to requested medication was 12/12/18.

## 2021-06-01 NOTE — TELEPHONE ENCOUNTER
Prior Authorization Request  Who s requesting:  Pharmacy  Pharmacy Name and Location: [facility]  Medication Name:   oxyCODONE-acetaminophen (PERCOCET/ENDOCET) 5-325 mg per tablet 120 tablet 0 9/8/2019  No   Sig - Route: Take 1-2 tablets by mouth every 6 (six) hours as needed for pain. 1 or 2 every 6 hours ( max of 8 in 24 hours) - Oral       Insurance Plan: unknown  Insurance Member ID Number:  unknown  Informed patient that prior authorizations can take up to 10 business days for response:   No  Okay to leave a detailed message: Yes    KEY [id number]

## 2021-06-02 NOTE — TELEPHONE ENCOUNTER
Controlled Substance Refill Request  Medication:   Requested Prescriptions     Pending Prescriptions Disp Refills     fentaNYL (DURAGESIC) 25 mcg/hr 10 patch 0     Sig: APPLY 1 PATCH ON THE SKIN EVERY 3RD DAY     oxyCODONE-acetaminophen (PERCOCET/ENDOCET) 5-325 mg per tablet 240 tablet 0     Sig: Take 1-2 tablets by mouth every 6 (six) hours as needed for pain. 1 or 2 every 6 hours ( max of 8 in 24 hours)     Date Last Fill: 9.24.19  [facility]   Submit electronically to pharmacy  Controlled Substance Agreement on File:   Encounter-Level CSA Scan Date - 02/04/2016:    Scan on 2/4/2016: Controlled Substance       Last office visit: Last office visit pertaining to requested medication was 9.24.19.

## 2021-06-03 ENCOUNTER — RECORDS - HEALTHEAST (OUTPATIENT)
Dept: ADMINISTRATIVE | Facility: CLINIC | Age: 56
End: 2021-06-03

## 2021-06-03 NOTE — TELEPHONE ENCOUNTER
Controlled Substance Refill Request  Medication:   Requested Prescriptions     Pending Prescriptions Disp Refills     oxyCODONE-acetaminophen (PERCOCET/ENDOCET) 5-325 mg per tablet 240 tablet 0     Sig: Take 1-2 tablets by mouth every 6 (six) hours as needed for pain. 1 or 2 every 6 hours ( max of 8 in 24 hours)     fentaNYL (DURAGESIC) 25 mcg/hr 10 patch 0     Sig: APPLY 1 PATCH ON THE SKIN EVERY 3RD DAY     Date Last Fill:   fentaNYL (DURAGESIC) 25 mcg/hr 10 patch 0 10/22/2019     oxyCODONE-acetaminophen (PERCOCET/ENDOCET) 5-325 mg per tablet 240 tablet 0 10/22/2019     [facility]    Submit electronically to pharmacy  Controlled Substance Agreement on File:   Encounter-Level CSA Scan Date - 02/04/2016:    Scan on 2/4/2016: Controlled Substance       Last office visit: Last office visit pertaining to requested medication was 9/24/19.

## 2021-06-03 NOTE — TELEPHONE ENCOUNTER
Controlled Substance Refill Request  Medication:   Requested Prescriptions     Pending Prescriptions Disp Refills     oxyCODONE-acetaminophen (PERCOCET/ENDOCET) 5-325 mg per tablet 240 tablet 0     Sig: Take 1-2 tablets by mouth every 6 (six) hours as needed for pain. 1 or 2 every 6 hours ( max of 8 in 24 hours)     Date Last Fill: 10/22/19  [facility]  Submit electronically to pharmacy  Controlled Substance Agreement on File:   Encounter-Level CSA Scan Date - 02/04/2016:    Scan on 2/4/2016: Controlled Substance       Last office visit: Last office visit pertaining to requested medication was 9/24/19.

## 2021-06-04 ENCOUNTER — COMMUNICATION - HEALTHEAST (OUTPATIENT)
Dept: FAMILY MEDICINE | Facility: CLINIC | Age: 56
End: 2021-06-04

## 2021-06-04 DIAGNOSIS — G89.4 CHRONIC PAIN SYNDROME: ICD-10-CM

## 2021-06-04 RX ORDER — FENTANYL 25 UG/1
PATCH TRANSDERMAL
Qty: 10 PATCH | Refills: 0 | Status: SHIPPED | OUTPATIENT
Start: 2021-06-04 | End: 2021-08-19

## 2021-06-04 RX ORDER — OXYCODONE AND ACETAMINOPHEN 5; 325 MG/1; MG/1
1-2 TABLET ORAL EVERY 6 HOURS PRN
Qty: 240 TABLET | Refills: 0 | Status: SHIPPED | OUTPATIENT
Start: 2021-06-04 | End: 2021-08-19

## 2021-06-04 NOTE — TELEPHONE ENCOUNTER
RN cannot approve Refill Request    RN can NOT refill this medication med is not covered by policy/route to provider.     Last office visit: 9/24/2019 Brayden Mahmood MD Last Physical: Visit date not found Last MTM visit: Visit date not found Last visit same specialty: Visit date not found.  Next visit within 3 mo: Visit date not found  Next physical within 3 mo: Visit date not found      Emeka Hernandez, [facility] Connection Triage/Med Refill 12/31/2019    Requested Prescriptions   Pending Prescriptions Disp Refills     nitrofurantoin (MACRODANTIN) 50 MG capsule [Pharmacy Med Name: NITROFURANTOIN MACRO 50MG CAPSULES] 90 capsule 0     Sig: TAKE 1 CAPSULE BY MOUTH EVERY DAY       There is no refill protocol information for this order

## 2021-06-04 NOTE — TELEPHONE ENCOUNTER
Controlled Substance Refill Request  Medication:   Requested Prescriptions     Pending Prescriptions Disp Refills     oxyCODONE-acetaminophen (PERCOCET/ENDOCET) 5-325 mg per tablet 240 tablet 0     Sig: Take 1-2 tablets by mouth every 6 (six) hours as needed for pain. 1 or 2 every 6 hours ( max of 8 in 24 hours)     fentaNYL (DURAGESIC) 25 mcg/hr 10 patch 0     Sig: APPLY 1 PATCH ON THE SKIN EVERY 3RD DAY     Date Last Fill: 11.19.19  [facility]   Submit electronically to pharmacy  Controlled Substance Agreement on File:   Encounter-Level CSA Scan Date - 12/12/2018:    Scan on 12/17/2018  2:22 PM           Encounter-Level CSA Scan Date - 02/04/2016:    Scan on 2/4/2016: Controlled Substance       Last office visit: Last office visit pertaining to requested medication was 9.24.19.

## 2021-06-04 NOTE — TELEPHONE ENCOUNTER
RN cannot approve Refill Request    RN can NOT refill this medication PCP messaged that patient is overdue for Labs. Last office visit: 9/24/2019 Brayden Mahmood MD Last Physical: Visit date not found Last MTM visit: Visit date not found Last visit same specialty: 9/24/2019 Brayden Mahmood MD.  Next visit within 3 mo: Visit date not found  Next physical within 3 mo: Visit date not found      Omari Radford, [facility] Triage/Med Refill 12/22/2019    Requested Prescriptions   Pending Prescriptions Disp Refills     levothyroxine (SYNTHROID, LEVOTHROID) 75 MCG tablet [Pharmacy Med Name: LEVOTHYROXINE 0.075MG (75MCG) TABS] 90 tablet 3     Sig: TAKE 1 TABLET BY MOUTH DAILY AT 6AM       Thyroid Hormones Protocol Failed - 12/19/2019  3:24 AM        Failed - TSH on file in past 12 months for patient age 12 & older     TSH   Date Value Ref Range Status   12/12/2018 1.21 0.30 - 5.00 uIU/mL Final                   Passed - Provider visit in past 12 months or next 3 months     Last office visit with prescriber/PCP: 9/24/2019 Brayden Mahmood MD OR same dept: 9/24/2019 Brayden Mahmood MD OR same specialty: 9/24/2019 Brayden Mahmood MD  Last physical: Visit date not found Last MTM visit: Visit date not found   Next visit within 3 mo: Visit date not found  Next physical within 3 mo: Visit date not found  Prescriber OR PCP: Brayden Mahmood MD  Last diagnosis associated with med order: 1. Hypothyroidism, unspecified type  - levothyroxine (SYNTHROID, LEVOTHROID) 75 MCG tablet [Pharmacy Med Name: LEVOTHYROXINE 0.075MG (75MCG) TABS]; TAKE 1 TABLET BY MOUTH DAILY AT 6AM  Dispense: 90 tablet; Refill: 3    If protocol passes may refill for 12 months if within 3 months of last provider visit (or a total of 15 months).

## 2021-06-04 NOTE — TELEPHONE ENCOUNTER
Refill Approved    Rx renewed per Medication Renewal Policy. Medication was last renewed on 12/12/18.    Tavia Delgado, [facility] Triage/Med Refill 12/14/2019     Requested Prescriptions   Pending Prescriptions Disp Refills     PARoxetine (PAXIL) 40 MG tablet [Pharmacy Med Name: PAROXETINE 40MG TABLETS] 90 tablet 0     Sig: TAKE 1 TABLET(40 MG) BY MOUTH EVERY MORNING       [facility] Refill Protocol  Passed - 12/12/2019  3:24 AM        Passed - PCP or prescribing provider visit in last year     Last office visit with prescriber/PCP: 9/24/2019 Brayden Mahmood MD OR same dept: 9/24/2019 Brayden Mahmood MD OR same specialty: 9/24/2019 Brayden Mahmood MD  Last physical: Visit date not found Last MTM visit: Visit date not found   Next visit within 3 mo: Visit date not found  Next physical within 3 mo: Visit date not found  Prescriber OR PCP: Brayden Mahmood MD  Last diagnosis associated with med order: 1. Current mild episode of major depressive disorder without prior episode (H)  - PARoxetine (PAXIL) 40 MG tablet [Pharmacy Med Name: PAROXETINE 40MG TABLETS]; TAKE 1 TABLET(40 MG) BY MOUTH EVERY MORNING  Dispense: 90 tablet; Refill: 0    If protocol passes may refill for 12 months if within 3 months of last provider visit (or a total of 15 months).

## 2021-06-05 NOTE — TELEPHONE ENCOUNTER
Controlled Substance Refill Request  Medication Name:   Requested Prescriptions     Pending Prescriptions Disp Refills     oxyCODONE-acetaminophen (PERCOCET/ENDOCET) 5-325 mg per tablet 240 tablet 0     Sig: Take 1-2 tablets by mouth every 6 (six) hours as needed for pain. 1 or 2 every 6 hours ( max of 8 in 24 hours)     fentaNYL (DURAGESIC) 25 mcg/hr 10 patch 0     Sig: APPLY 1 PATCH ON THE SKIN EVERY 3RD DAY     Date Last Fill:   oxyCODONE-acetaminophen (PERCOCET/ENDOCET) 5-325 mg per tablet 240 tablet 0 12/16/2019  No   Sig - Route: Take 1-2 tablets by mouth every 6 (six) hours as needed for pain. 1 or 2 every 6 hours ( max of 8 in 24 hours) - Oral   Sent to pharmacy as: oxyCODONE-acetaminophen 5 mg-325 mg tablet (PERCOCET/ENDOCET)   Earliest Fill Date: 12/16/2019   E-Prescribing Status: Receipt confirmed by pharmacy (12/16/2019  3:04 PM CST)     fentaNYL (DURAGESIC) 25 mcg/hr 10 patch 0 12/16/2019  No   Sig: APPLY 1 PATCH ON THE SKIN EVERY 3RD DAY   Sent to pharmacy as: fentaNYL 25 mcg/hr transdermal patch (DURAGESIC)   Earliest Fill Date: 12/16/2019   E-Prescribing Status: Receipt confirmed by pharmacy (12/16/2019  3:04 PM CST)   [facility]ed [facility]: [facility]  Submit electronically to pharmacy  Controlled Substance Agreement on file:   Encounter-Level CSA Scan Date - 12/12/2018:    Scan on 12/17/2018  2:22 PM           Encounter-Level CSA Scan Date - 02/04/2016:    Scan on 2/4/2016: Controlled Substance        Last office visit:  9/24/19

## 2021-06-05 NOTE — TELEPHONE ENCOUNTER
Patient Returning Call  Reason for call:  Patient called back.  Information relayed to patient:  n/a  Patient has additional questions:  Yes  If YES, what are your questions/concerns:  Calling on the status of this message.  Please address today as she is out.  Okay to leave a detailed message?: Yes

## 2021-06-06 NOTE — TELEPHONE ENCOUNTER
Controlled Substance Refill Request  Medication Name:   Requested Prescriptions     Pending Prescriptions Disp Refills     oxyCODONE-acetaminophen (PERCOCET/ENDOCET) 5-325 mg per tablet 240 tablet 0     Sig: Take 1-2 tablets by mouth every 6 (six) hours as needed for pain. 1 or 2 every 6 hours ( max of 8 in 24 hours)     fentaNYL (DURAGESIC) 25 mcg/hr 10 patch 0     Sig: APPLY 1 PATCH ON THE SKIN EVERY 3RD DAY     Date Last Fill: 01/15/2020  Requested Pharmacy: [id number] [place], [place] - [facility] [address] AT [facility]  Submit electronically to pharmacy  Controlled Substance Agreement on file:   Encounter-Level CSA Scan Date - 12/12/2018:    Scan on 12/17/2018  2:22 PM           Encounter-Level CSA Scan Date - 02/04/2016:    Scan on 2/4/2016: Controlled Substance        Last office visit:  09/24/2019

## 2021-06-06 NOTE — TELEPHONE ENCOUNTER
Who is calling:  Patient   Reason for Call:  Patient states one of her house member have flu patient is requesting for preventive Tamiflu . Please advise .  Date of last appointment with primary care: 09/24/19  Okay to leave a detailed message: No

## 2021-06-06 NOTE — TELEPHONE ENCOUNTER
Controlled Substance Refill Request  Medication Name:   Requested Prescriptions     Pending Prescriptions Disp Refills     fentaNYL (DURAGESIC) 25 mcg/hr 10 patch 0     Sig: APPLY 1 PATCH ON THE SKIN EVERY 3RD DAY     oxyCODONE-acetaminophen (PERCOCET/ENDOCET) 5-325 mg per tablet 240 tablet 0     Sig: Take 1-2 tablets by mouth every 6 (six) hours as needed for pain. 1 or 2 every 6 hours ( max of 8 in 24 hours)   Date Last Fill: 02/14/20  Is patient out of medication?: No, 2 days left  Patient notified refills processed within 3 business days:  Yes  [id number]  Submit electronically to pharmacy  Controlled Substance Agreement on file:   Encounter-Level CSA Scan Date - 12/12/2018:    Scan on 12/17/2018  2:22 PM           Encounter-Level CSA Scan Date - 02/04/2016:    Scan on 2/4/2016: Controlled Substance        Last office visit:  Unknown

## 2021-06-06 NOTE — TELEPHONE ENCOUNTER
reviewed - last fill 1/15/2020-okay for refill -per last PCP note, needs follow-up in March 2020    Chloé Goyal MD  [facility]

## 2021-06-07 NOTE — TELEPHONE ENCOUNTER
Controlled Substance Refill Request  Medication Name:   Requested Prescriptions     Pending Prescriptions Disp Refills     oxyCODONE-acetaminophen (PERCOCET/ENDOCET) 5-325 mg per tablet 240 tablet 0     Sig: Take 1-2 tablets by mouth every 6 (six) hours as needed for pain. 1 or 2 every 6 hours ( max of 8 in 24 hours)     fentaNYL (DURAGESIC) 25 mcg/hr 10 patch 0     Sig: APPLY 1 PATCH ON THE SKIN EVERY 3RD DAY     Date Last Fill: 04/08/2020  Requested Pharmacy: [facility] #[id number] [place], [place] - [facility] [address] AT [facility]   Submit electronically to pharmacy  Controlled Substance Agreement on file:   Encounter-Level CSA Scan Date - 12/12/2018:    Scan on 12/17/2018  2:22 PM           Encounter-Level CSA Scan Date - 02/04/2016:    Scan on 2/4/2016: Controlled Substance        Last office visit:  09/24/2020

## 2021-06-07 NOTE — TELEPHONE ENCOUNTER
Controlled Substance Refill Request  Medication Name:   Requested Prescriptions     Pending Prescriptions Disp Refills     fentaNYL (DURAGESIC) 25 mcg/hr 10 patch 0     Sig: APPLY 1 PATCH ON THE SKIN EVERY 3RD DAY     oxyCODONE-acetaminophen (PERCOCET/ENDOCET) 5-325 mg per tablet 240 tablet 0     Sig: Take 1-2 tablets by mouth every 6 (six) hours as needed for pain. 1 or 2 every 6 hours ( max of 8 in 24 hours)     Date Last Fill: 03/09/20  Is patient out of medication?:  Yes  Patient notified refills processed within 3 business days:  Yes  [facility]  Submit electronically to pharmacy  Controlled Substance Agreement on file:   Encounter-Level CSA Scan Date - 12/12/2018:    Scan on 12/17/2018  2:22 PM           Encounter-Level CSA Scan Date - 02/04/2016:    Scan on 2/4/2016: Controlled Substance        Last office visit:    09/24/19

## 2021-06-08 NOTE — TELEPHONE ENCOUNTER
Controlled Substance Refill Request  Medication Name:   Requested Prescriptions     Pending Prescriptions Disp Refills     oxyCODONE-acetaminophen (PERCOCET/ENDOCET) 5-325 mg per tablet 240 tablet 0     Sig: Take 1-2 tablets by mouth every 6 (six) hours as needed for pain. 1 or 2 every 6 hours ( max of 8 in 24 hours)     fentaNYL (DURAGESIC) 25 mcg/hr 10 patch 0     Sig: APPLY 1 PATCH ON THE SKIN EVERY 3RD DAY     Date Last Fill: 05/04/20  [facility]  Submit electronically to pharmacy  Controlled Substance Agreement on file:   Encounter-Level CSA Scan Date - 12/12/2018:    Scan on 12/17/2018  2:22 PM           Encounter-Level CSA Scan Date - 02/04/2016:    Scan on 2/4/2016: Controlled Substance        Last office visit:  09/24/19  Patient also would like an E visit

## 2021-06-08 NOTE — PROGRESS NOTES
Provider E-Visit time total (minutes): 2    Will arrange for virtual visit today in order to further evaluate due to complex PMH.

## 2021-06-08 NOTE — TELEPHONE ENCOUNTER
Who is calling:  The patient   Reason for Call:  The patient states she is calling to speak with Kell. The patient states she had a telephone visit last week but wasn't able to complete because The patient states her phone flew out of her hand and it damaged the phone. The patient has a new phone now.This writer offered to transfer the patient to scheduling but the patient declined and would like a call from Brayden Mahmood MD's team.   Date of last appointment with primary care:   Okay to leave a detailed message: Yes

## 2021-06-08 NOTE — PROGRESS NOTES
"Lyn Russ is a 55 y.o. female who is being evaluated via a billable telephone visit.      The patient has been notified of following:     \"This telephone visit will be conducted via a call between you and your physician/provider. We have found that certain health care needs can be provided without the need for a physical exam.  This service lets us provide the care you need with a short phone conversation.  If a prescription is necessary we can send it directly to your pharmacy.  If lab work is needed we can place an order for that and you can then stop by our lab to have the test done at a later time.    Telephone visits are billed at different rates depending on your insurance coverage. During this emergency period, for some insurers they may be billed the same as an in-person visit.  Please reach out to your insurance provider with any questions.    If during the course of the call the physician/provider feels a telephone visit is not appropriate, you will not be charged for this service.\"    Patient has given verbal consent to a Telephone visit? Yes    What phone number would you like to be contacted at? [phone]    Patient would like to receive their AVS by AVS Preference: Kassandra.    \"Claire\"   Single   1 son - Jed   1 daughter - Breyton (\"Brey\")   1 granddaughter - Dre  Mom - ( 4/22/15 per phone message) Jeff (she is an RN) - h/o R.A.   Dad - Jose   1 older bro (middle sibling) - Moiz   1 younger sis (oldenst child) - Jahaira   Mom -  ( - AAA dissection with multiorgan failure)  No smoke   EtOH: infrequent   Surgeries:  (emergent due to nuchal cord); subsequent    Work: disabled due to spinal cord pain   Self-cath q 4-5 hours (5-6 times per day)   H/O muscle spacticity, urinary symptoms; leg swelling worse with menses   Gets diarrhea with her period (often results in UTI due to hygiene issues with diarrhea)   Hospitalizations: Dec, 1999 (age 34) spinal cord " "hemorrhage  at T7 level (\"couldn't suck stomach in, then subsequent burning)   Procedures: 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 Dasilva at [facility]   2-12-13 PA approved for lyrica 75mg indefinitely    Additional provider notes: Virtual visit completed.  Urinary incontinence worse with transfers.  Patient self catheterizes 5 or 6 times per day.  Does not feel that she has a urinary tract infection and feels like she can tell if cloudy urine, change in odor etc.  Patient with spinal cord hemorrhage age 34 back in 1999 at the T7 level.  Has not seen a urologist in years.  No fever.  Would like Covid-19 antibody testing for illness in February without any residual concerns at this time.  Comprehensive review of systems as above otherwise all negative.        Assessment/Plan:    1. Urinary incontinence without sensory awareness  Urinary incontinence without sensory awareness.  Referred to urology.  Patient continues Ditropan 5 mg using 2 tablets 3 times daily.  Will continue currently until further recommendation from urologist.  Patient describes self catheterizing 5 or 6 times per day and can increase to 6 or 7 times a day in case of overflow incontinence as well.  Continues nitrofurantoin for UTI prophylaxis.  - Ambulatory referral to Urology    2. 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 Chronic pain syndrome  Continues use of fentanyl patch 25 mcg every third day plus oxycodone acetaminophen 5/325 using 1-2 tablets 4 times daily.    4. Screening examination for infectious disease  Patient requesting Covid-19 antibiotic testing.  Order placed.  Patient will schedule for lab only visit.  - COVID-19 Virus Antibody; Future        Phone call duration:  15 minutes    Brayden Mahmood MD  "

## 2021-06-08 NOTE — TELEPHONE ENCOUNTER
Reason contacted:  Appointment   Information relayed:  Helped reschedule patients telephone visit. She unfortunately dropped her phone and it broke therefore was unable to complete her telephone visit that was previously scheduled with Dr. Mahmood.   Additional questions:  No  Further follow-up needed:  No  Okay to leave a detailed message:  No

## 2021-06-09 NOTE — TELEPHONE ENCOUNTER
Controlled Substance Refill Request  Medication Name:   Requested Prescriptions     Pending Prescriptions Disp Refills     oxyCODONE-acetaminophen (PERCOCET/ENDOCET) 5-325 mg per tablet 240 tablet 0     Sig: Take 1-2 tablets by mouth every 6 (six) hours as needed for pain. 1 or 2 every 6 hours ( max of 8 in 24 hours)     fentaNYL (DURAGESIC) 25 mcg/hr 10 patch 0     Sig: APPLY 1 PATCH ON THE SKIN EVERY 3RD DAY     Date Last Fill: 6/1/2020  [facility]  Submit electronically to pharmacy  Controlled Substance Agreement on file:   Encounter-Level CSA Scan Date - 12/12/2018:    Scan on 12/17/2018  2:22 PM           Encounter-Level CSA Scan Date - 02/04/2016:    Scan on 2/4/2016: Controlled Substance        Last office visit:  6/9/20

## 2021-06-10 NOTE — TELEPHONE ENCOUNTER
RN cannot approve Refill Request    RN can NOT refill this medication med is not covered by policy/route to provider. Last office visit: 9/24/2019 Brayden Mahmood MD Last Physical: Visit date not found Last MTM visit: Visit date not found Last visit same specialty: 9/24/2019 Brayden Mahmood MD.  Next visit within 3 mo: Visit date not found  Next physical within 3 mo: Visit date not found      [facility]avia Delgado, [facility] Triage/Med Refill 8/19/2020    Requested Prescriptions   Pending Prescriptions Disp Refills     baclofen (LIORESAL) 20 MG tablet [Pharmacy Med Name: BACLOFEN 20MG TABLETS] 180 tablet 3     Sig: TAKE 1 TABLET(20 MG) BY MOUTH TWICE DAILY       There is no refill protocol information for this order

## 2021-06-10 NOTE — TELEPHONE ENCOUNTER
Controlled Substance Refill Request  Medication Name:   Requested Prescriptions     Pending Prescriptions Disp Refills     fentaNYL (DURAGESIC) 25 mcg/hr 10 patch 0     Sig: APPLY 1 PATCH ON THE SKIN EVERY 3RD DAY     oxyCODONE-acetaminophen (PERCOCET/ENDOCET) 5-325 mg per tablet 240 tablet 0     Sig: Take 1-2 tablets by mouth every 6 (six) hours as needed for pain. 1 or 2 every 6 hours ( max of 8 in 24 hours)     Date Last Fill: 06/29/2020  Requested Pharmacy: [facility] #[id number] [place], [place] - [facility] [address] AT [facility]  Submit electronically to pharmacy  Controlled Substance Agreement on file:   Encounter-Level CSA Scan Date - 12/12/2018:    Scan on 12/17/2018  2:22 PM           Encounter-Level CSA Scan Date - 02/04/2016:    Scan on 2/4/2016: Controlled Substance        Last office visit:  06/09/2020

## 2021-06-10 NOTE — TELEPHONE ENCOUNTER
Controlled Substance Refill Request  Medication Name:   Requested Prescriptions     Pending Prescriptions Disp Refills     oxyCODONE-acetaminophen (PERCOCET/ENDOCET) 5-325 mg per tablet 240 tablet 0     Sig: Take 1-2 tablets by mouth every 6 (six) hours as needed for pain. 1 or 2 every 6 hours ( max of 8 in 24 hours)     fentaNYL (DURAGESIC) 25 mcg/hr 10 patch 0     Sig: APPLY 1 PATCH ON THE SKIN EVERY 3RD DAY     Date Last Fill: 7/27/20  Is patient out of medication?: No, 3 days left  Patient notified refills processed within 3 business days:  Yes  [facility]  Submit electronically to pharmacy  Controlled Substance Agreement on file:   Encounter-Level CSA Scan Date - 12/12/2018:    Scan on 12/17/2018  2:22 PM           Encounter-Level CSA Scan Date - 02/04/2016:    Scan on 2/4/2016: Controlled Substance        Last office visit:  6/9/20

## 2021-06-10 NOTE — PROGRESS NOTES
Assessment:    1. Chronic pain syndrome  oxyCODONE-acetaminophen (PERCOCET) 5-325 mg per tablet    fentaNYL (DURAGESIC) 25 mcg/hr    Drug Abuse 1+, Urine   2. Hypothyroidism, unspecified type  Thyroid Stimulating Hormone (TSH)    Comprehensive Metabolic Panel   3. Anxiety     4. Urinary Tract Infection  Urinalysis-UC if Indicated   5. Mild single current episode of major depressive disorder  PARoxetine (PAXIL) 20 MG tablet    Comprehensive Metabolic Panel         Plan:    40 minutes total time with patient, > 50% with counseling and coordination of cares.  Chronic pain stable.  Refill on fentanyl and oxycodone acetaminophen.  Attempts at collection of urine for UA UC with history recurrent UTI and routine urine drug screen.  Patient states unable to collect urine specimen with short catheter apparently missing urinalysis container with urine emptying into toilet.  Did request urine attempt at collection with a hat however patient did leave without collecting urine specimen.  Dosing of pain medications otherwise remain stable.  Would refer patient to Pain Clinic if requesting further pain medication adjustments.  The check TSH following recent dose adjustment with decrease levothyroxine from 88  g down to 75  g daily.  Comprehensive metabolic panel for medication monitoring.  Continue paroxetine 20 mg at bedtime which she is tolerated better than prior attempts at 30 mg dosing.  Neuro referral if recurrent concerns for memory difficulties, however, patient feels symptoms have resolved.  PHQ - 9 = 4.5/27 with MEENA - 7 = 0/21 today.        Subjective:    Lyn Russ is seen today for follow-up office visit.  In general, chronic medical concerns have remained stable.  Last office visit 9/16/16 with recommended 6-month interval follow-up anticipated. Has continued to do well. 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 Continues oxycodone acetaminophen 5/325 one to 2 tablets every 6 hours as needed #240  "tablets per month. She remains on fentanyl patch 25  g every 3 days. Felt that prior dose of 50 mcg was causing increased sedation. Slight improvement since dose change. Has tried MS Contin with poor results. Halo of pain described around mid abdomen with radiation towards pelvis lower extremities. Skin sensitivity to light touch otherwise has difficulty discriminating temperature extremes. Amitriptyline and nortriptyline without benefits. Has been seen previously through pain clinic. History recurrent UTIs controlled well currently with nitrofurantoin 50 mg daily for prophylaxis. No current dysuria. Continues straight cath perhaps 5-6 times per day. Leg swelling. Muscle spasticity.  Continues baclofen 20 mg twice daily. Muscle contractures. Reviewed office notes in detail from 2013 in which she established care in this office. Post-ablative hypothyroidism with history of Graves' disease. Continues levothyroxine 75 mcg daily after prior TSH suppressed while on 88 mcg dose. Review of systems suggests euthyroid state. Lyn has an underlying history of major depression single episode.  Did decrease Paxil from 30 mg down to 20 mg at bedtime due to sedation concerns. No current dysuria. Contractures in lower extremities.  Seen today with her daughter for assistance.  Prior concern for memory loss has resolved currently.    \"Claire\"   Single   1 son - Jed   1 daughter - Patricia (\"Brey\")   Mom - ( 4/22/15 per phone message) Jeff (she is an RN) - h/o R.A.   Dad - Jose   1 older bro (middle sibling) - Moiz   1 younger sis (oldenst child) - Jahaira   Mom -  ( - AAA dissection with multiorgan failure)  No smoke   EtOH: infrequent   Surgeries:  (emergent due to nuchal cord); subsequent    Work: disabled due to spinal cord pain   Self-cath q 4-5 hours (5-6 times per day)   H/O muscle spacticity, urinary symptoms; leg swelling worse with menses   Gets diarrhea with her period " "(often results in UTI due to hygiene issues with diarrhea)   Hospitalizations: Dec, 1999 (age 34) spinal cord hemorrhage age T7 (\"couldn't suck stomach in, then subsequent burning)   Procedures: 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 Dasilva at [facility]   2-12-13 PA approved for lyrica 75mg indefinitely     No past surgical history on file.     No family history on file.     No past medical history on file.     Social History   Substance Use Topics     Smoking status: Never Smoker     Smokeless tobacco: Never Used     Alcohol use Yes        Current Outpatient Prescriptions   Medication Sig Dispense Refill     baclofen (LIORESAL) 20 MG tablet TAKE 1 TABLET BY MOUTH TWICE DAILY 180 tablet 3     fentaNYL (DURAGESIC) 25 mcg/hr APPLY 1 PATCH ON THE SKIN EVERY 3RD DAY 10 patch 0     levothyroxine (SYNTHROID, LEVOTHROID) 75 MCG tablet TAKE 1 TABLET BY MOUTH DAILY AT 6:00 AM. 90 tablet 1     nitrofurantoin (MACRODANTIN) 50 MG capsule TAKE 1 CAPSULE BY MOUTH EVERY DAY 90 capsule 3     oxybutynin (DITROPAN) 5 MG tablet TAKE 2 TABLETS BY MOUTH THREE TIMES DAILY FOR BLADDER 540 tablet 1     oxyCODONE-acetaminophen (PERCOCET) 5-325 mg per tablet 1 or 2 every 6 hours ( max of 8 in 24 hours) 240 tablet 0     PARoxetine (PAXIL) 20 MG tablet TAKE 1 TABLET BY MOUTH EVERY DAY 90 tablet 3     sulfamethoxazole-trimethoprim (SEPTRA DS) 800-160 mg per tablet TAKE 1 TABLET BY MOUTH TWICE DAILY FOR 7 DAYS 14 tablet 0     No current facility-administered medications for this visit.           Objective:    Vitals:    05/02/17 1426   BP: 130/70   Pulse: 72      There is no height or weight on file to calculate BMI.    Alert.  In wheelchair.  Chest clear.  Cardiac exam regular.  Lower extremity contractures noted.  No rash.  Cooperative and talkative.  Smiling.  No active psychoses.       "

## 2021-06-11 NOTE — TELEPHONE ENCOUNTER
Controlled Substance Refill Request  Medication Name:   Requested Prescriptions     Pending Prescriptions Disp Refills     fentaNYL (DURAGESIC) 25 mcg/hr 10 patch 0     Sig: APPLY 1 PATCH ON THE SKIN EVERY 3RD DAY     oxyCODONE-acetaminophen (PERCOCET/ENDOCET) 5-325 mg per tablet 240 tablet 0     Sig: Take 1-2 tablets by mouth every 6 (six) hours as needed for pain. 1 or 2 every 6 hours ( max of 8 in 24 hours)     Date Last Fill: 8/25/2020  Is patient out of medication?: No, 3 days left  Patient notified refills processed within 3 business days:  Yes  [facility]  Submit electronically to pharmacy  Controlled Substance Agreement on file:   Encounter-Level CSA Scan Date - 12/12/2018:    Scan on 12/17/2018  2:22 PM           Encounter-Level CSA Scan Date - 02/04/2016:    Scan on 2/4/2016: Controlled Substance        Last office visit:  6/9/2020, virtual visit

## 2021-06-12 NOTE — TELEPHONE ENCOUNTER
Refill Approved    Rx renewed per Medication Renewal Policy. Medication was last renewed on 12/14/19.    Tavia Delgado, [facility] Connection Triage/Med Refill 10/5/2020     Requested Prescriptions   Pending Prescriptions Disp Refills     PARoxetine (PAXIL) 40 MG tablet 90 tablet 3     Sig: TAKE 1 TABLET(40 MG) BY MOUTH EVERY MORNING       [facility] Refill Protocol  Passed - 10/5/2020 10:06 AM        Passed - PCP or prescribing provider visit in last year     Last office visit with prescriber/PCP: 9/24/2019 Brayden Mahmood MD OR same dept: Visit date not found OR same specialty: 9/24/2019 Brayden Mahmood MD  Last physical: Visit date not found Last MTM visit: Visit date not found   Next visit within 3 mo: Visit date not found  Next physical within 3 mo: Visit date not found  Prescriber OR PCP: Brayden Mahmood MD  Last diagnosis associated with med order: 1. Current mild episode of major depressive disorder without prior episode (H)  - PARoxetine (PAXIL) 40 MG tablet; TAKE 1 TABLET(40 MG) BY MOUTH EVERY MORNING  Dispense: 90 tablet; Refill: 3    If protocol passes may refill for 12 months if within 3 months of last provider visit (or a total of 15 months).

## 2021-06-12 NOTE — TELEPHONE ENCOUNTER
Refill Request  Did you contact pharmacy: Yes  Medication name:   Requested Prescriptions     Pending Prescriptions Disp Refills     PARoxetine (PAXIL) 40 MG tablet 90 tablet 3     Sig: TAKE 1 TABLET(40 MG) BY MOUTH EVERY MORNING     Who prescribed the medication: Brayden Mahmood MD  [facility]  Is patient out of medication: Yes  Patient notified refills processed in 3 business days:  yes  Okay to leave a detailed message: yes

## 2021-06-13 NOTE — TELEPHONE ENCOUNTER
Controlled Substance Refill Request  Medication Name:   Requested Prescriptions     Pending Prescriptions Disp Refills     fentaNYL (DURAGESIC) 25 mcg/hr 10 patch 0     Sig: APPLY 1 PATCH ON THE SKIN EVERY 3RD DAY     oxyCODONE-acetaminophen (PERCOCET/ENDOCET) 5-325 mg per tablet 240 tablet 0     Sig: Take 1-2 tablets by mouth every 6 (six) hours as needed for pain. 1 or 2 every 6 hours ( max of 8 in 24 hours)     Date Last Fill: 11/16/20  [facility]  Submit electronically to pharmacy  Controlled Substance Agreement on file:   Encounter-Level CSA Scan Date - 12/12/2018:    Scan on 12/17/2018  2:22 PM           Encounter-Level CSA Scan Date - 02/04/2016:    Scan on 2/4/2016: Controlled Substance        Last office visit:  6/9/20

## 2021-06-13 NOTE — TELEPHONE ENCOUNTER
Controlled Substance Refill Request  Medication Name:   Requested Prescriptions     Pending Prescriptions Disp Refills     oxyCODONE-acetaminophen (PERCOCET/ENDOCET) 5-325 mg per tablet 240 tablet 0     Sig: Take 1-2 tablets by mouth every 6 (six) hours as needed for pain. 1 or 2 every 6 hours ( max of 8 in 24 hours)     fentaNYL (DURAGESIC) 25 mcg/hr 10 patch 0     Sig: APPLY 1 PATCH ON THE SKIN EVERY 3RD DAY     Date Last Fill: 10/17/20  [facility]  Submit electronically to pharmacy  Controlled Substance Agreement on file:   Encounter-Level CSA Scan Date - 12/12/2018:    Scan on 12/17/2018  2:22 PM           Encounter-Level CSA Scan Date - 02/04/2016:    Scan on 2/4/2016: Controlled Substance        Last office visit:  06/09/20

## 2021-06-13 NOTE — TELEPHONE ENCOUNTER
Fax from [employer] for prescription renewal request for Nitrofurantoin macro 50 mg capsules.    Please set up new prescription if appropriate.
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
## 2021-06-14 NOTE — TELEPHONE ENCOUNTER
RN cannot approve Refill Request    RN can NOT refill this medication Protocol failed and NO refill given. Last office visit: 9/24/2019 Brayden Mahmood MD Last Physical: Visit date not found Last MTM visit: Visit date not found Last visit same specialty: 9/24/2019 Brayden Mahmood MD.  Next visit within 3 mo: Visit date not found  Next physical within 3 mo: Visit date not found      Ivon Rai, [facility] Triage/Med Refill 12/24/2020    Requested Prescriptions   Pending Prescriptions Disp Refills     nitrofurantoin (MACRODANTIN) 50 MG capsule 90 capsule 3     Sig: Take 1 capsule (50 mg total) by mouth daily.       There is no refill protocol information for this order        oxybutynin (DITROPAN) 5 MG tablet 540 tablet 3       There is no refill protocol information for this order

## 2021-06-14 NOTE — TELEPHONE ENCOUNTER
Clinic Action Needed: Yes, requests for a call back.   [facility] Triage Call  Presenting Problem:  Lyn reports that she accidentally scraped her left outer thigh 3 days ago. Used a facial exfoliant on her thigh and her skin came off, now has abrasions the size of her palms.    There is scabbing and yellowish drainage coming out of the abrasion. There is redness.  Appears raw and it stings.    PLAN:  - Clinic visit within 24 hours. Patient declined and would prefer a message sent to Dr. Mahmood. Asks if appropriate to take antibiotics.  Care advice reviewed: first aid - use saline spray to area (or wash with antibacterial soap and water). Rinse, then pat dry. Apply Neosporin ointment.  Patient verbalized understanding.      Routed to: PCP    Rosalie Ruth RN/[place] Nurse Advisor        Reason for Disposition    [1] Looks infected (spreading redness, pus) AND [2] no fever    Additional Information    Negative: [1] Major bleeding (e.g., actively dripping or spurting) AND [2] can't be stopped    Negative: Sounds like a life-threatening emergency to the triager    Negative: [1] Bleeding AND [2] won't stop after 10 minutes of direct pressure (using correct technique)    Negative: Skin is split open or gaping (or length > 1/2 inch or 12 mm)    Negative: Skin loss goes very deep (e.g., can see bones or tendons)    Negative: Skin loss involves more than 10% of surface area (Note: the palm of the hand = 1%)    Negative: [1] Dirt in the wound AND [2] not removed with 15 minutes of scrubbing    Negative: Sounds like a serious injury to the triager    Negative: [1] SEVERE pain AND [2] not improved 2 hours after pain medicine    Negative: [1] Looks infected AND [2] large red area (>2 inches or 5 cm) or streak    Negative: [1] Fever AND [2] bright red area or streak    Negative: Suspicious history for the injury    Protocols used: [id number]-[id number]
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
## 2021-06-14 NOTE — TELEPHONE ENCOUNTER
Controlled Substance Refill Request  Medication Name:   Requested Prescriptions     Pending Prescriptions Disp Refills     oxyCODONE-acetaminophen (PERCOCET/ENDOCET) 5-325 mg per tablet 240 tablet 0     Sig: Take 1-2 tablets by mouth every 6 (six) hours as needed for pain. 1 or 2 every 6 hours ( max of 8 in 24 hours)     fentaNYL (DURAGESIC) 25 mcg/hr 10 patch 0     Sig: APPLY 1 PATCH ON THE SKIN EVERY 3RD DAY     Date Last Fill: 12/14/2020  [facility]  Submit electronically to pharmacy  Controlled Substance Agreement on file:   Encounter-Level CSA Scan Date - 12/12/2018:    Scan on 12/17/2018  2:22 PM           Encounter-Level CSA Scan Date - 02/04/2016:    Scan on 2/4/2016: Controlled Substance        Last office visit:  6/9/2020 - virtual visit scheduled tomorrow at 3 pm with Dr. Mahmood for a medication check

## 2021-06-14 NOTE — TELEPHONE ENCOUNTER
Left message to call back for: Status update  Information to relay to patient:  Please get an update in regards to patients leg upon return phone call.    1. any signs of infection (redness, drainage, odor, fever)?  2. Is left outer thigh better, worse, or the same as when she called on 1/15/2021?

## 2021-06-15 ENCOUNTER — COMMUNICATION - HEALTHEAST (OUTPATIENT)
Dept: FAMILY MEDICINE | Facility: CLINIC | Age: 56
End: 2021-06-15

## 2021-06-15 DIAGNOSIS — F32.0 CURRENT MILD EPISODE OF MAJOR DEPRESSIVE DISORDER WITHOUT PRIOR EPISODE (H): ICD-10-CM

## 2021-06-15 RX ORDER — PAROXETINE 40 MG/1
TABLET, FILM COATED ORAL
Qty: 90 TABLET | Refills: 2 | Status: SHIPPED | OUTPATIENT
Start: 2021-06-15 | End: 2022-04-01

## 2021-06-15 NOTE — TELEPHONE ENCOUNTER
Controlled Substance Refill Request  Medication Name:   Requested Prescriptions     Pending Prescriptions Disp Refills     oxyCODONE-acetaminophen (PERCOCET/ENDOCET) 5-325 mg per tablet 240 tablet 0     Sig: Take 1-2 tablets by mouth every 6 (six) hours as needed for pain. 1 or 2 every 6 hours ( max of 8 in 24 hours)     fentaNYL (DURAGESIC) 25 mcg/hr 10 patch 0     Sig: APPLY 1 PATCH ON THE SKIN EVERY 3RD DAY     Date Last Fill: 2/12/2021  [facility]  Submit electronically to pharmacy  Controlled Substance Agreement on file:   Encounter-Level CSA Scan Date - 12/12/2018:    Scan on 12/17/2018  2:22 PM           Encounter-Level CSA Scan Date - 02/04/2016:    Scan on 2/4/2016: Controlled Substance        Last office visit:  1/12/2021

## 2021-06-15 NOTE — TELEPHONE ENCOUNTER
Controlled Substance Refill Request  Medication Name:   Requested Prescriptions     Pending Prescriptions Disp Refills     fentaNYL (DURAGESIC) 25 mcg/hr 10 patch 0     Sig: APPLY 1 PATCH ON THE SKIN EVERY 3RD DAY     oxyCODONE-acetaminophen (PERCOCET/ENDOCET) 5-325 mg per tablet 240 tablet 0     Sig: Take 1-2 tablets by mouth every 6 (six) hours as needed for pain. 1 or 2 every 6 hours ( max of 8 in 24 hours)     Date Last Fill: 1/11/21  [facility]  Submit electronically to pharmacy  Controlled Substance Agreement on file:   Encounter-Level CSA Scan Date - 12/12/2018:    Scan on 12/17/2018  2:22 PM           Encounter-Level CSA Scan Date - 02/04/2016:    Scan on 2/4/2016: Controlled Substance        Last office visit:  1/12/21

## 2021-06-16 NOTE — TELEPHONE ENCOUNTER
Telephone Encounter by Jessa Thomas at 2/13/2019  1:24 PM     Author: Jessa Thomas Service: -- Author Type: --    Filed: 2/13/2019  1:27 PM Encounter Date: 2/13/2019 Status: Signed    : Jessa Thomas       [facility] team  [phone]    PA has been initiated.

## 2021-06-16 NOTE — TELEPHONE ENCOUNTER
Telephone Encounter by Jessa Thomas at 2/12/2019  1:30 PM     Author: Jessa Thomas Service: -- Author Type: --    Filed: 2/12/2019  1:31 PM Encounter Date: 2/12/2019 Status: Signed    : Jessa Thomas       [facility] team  [phone]    PA has been initiated.

## 2021-06-16 NOTE — TELEPHONE ENCOUNTER
Telephone Encounter by Jessa Thomas at 2/13/2019  8:48 AM     Author: Jessa Thomas Service: -- Author Type: --    Filed: 2/13/2019  8:48 AM Encounter Date: 2/12/2019 Status: Signed    : Jessa Thomas APPROVED:    Approval start date: 1/13/2019  Approval end date: 2/12/2020    Pharmacy has been notified of approval and will contact patient when medication is ready for pickup.

## 2021-06-16 NOTE — TELEPHONE ENCOUNTER
RN cannot approve Refill Request    RN can NOT refill this medication Protocol failed and NO refill given. Last office visit: 9/24/2019 Brayden Mahmood MD Last Physical: Visit date not found Last MTM visit: Visit date not found Last visit same specialty: 9/24/2019 Brayden Mahmood MD.  Next visit within 3 mo: Visit date not found  Next physical within 3 mo: Visit date not found      Moiz DRUMMOND Juan Miguel, [facility] Triage/Med Refill 4/5/2021    Requested Prescriptions   Pending Prescriptions Disp Refills     levothyroxine (SYNTHROID, LEVOTHROID) 75 MCG tablet [Pharmacy Med Name: LEVOTHYROXINE 0.075MG (75MCG) TABS] 90 tablet 3     Sig: TAKE 1 TABLET BY MOUTH DAILY AT 6 AM       Thyroid Hormones Protocol Failed - 4/4/2021  8:30 PM        Failed - TSH on file in past 12 months for patient age 12 & older     TSH   Date Value Ref Range Status   12/12/2018 1.21 0.30 - 5.00 uIU/mL Final                   Passed - Provider visit in past 12 months or next 3 months     Last office visit with prescriber/PCP: 9/24/2019 Brayden Mahmood MD OR same dept: Visit date not found OR same specialty: 9/24/2019 Brayden Mahmood MD  Last physical: Visit date not found Last MTM visit: Visit date not found   Next visit within 3 mo: Visit date not found  Next physical within 3 mo: Visit date not found  Prescriber OR PCP: Brayden Mahmood MD  Last diagnosis associated with med order: 1. Hypothyroidism, unspecified type  - levothyroxine (SYNTHROID, LEVOTHROID) 75 MCG tablet [Pharmacy Med Name: LEVOTHYROXINE 0.075MG (75MCG) TABS]; TAKE 1 TABLET BY MOUTH DAILY AT 6 AM  Dispense: 90 tablet; Refill: 3    If protocol passes may refill for 12 months if within 3 months of last provider visit (or a total of 15 months).

## 2021-06-16 NOTE — TELEPHONE ENCOUNTER
Telephone Encounter by Jessa Thomas at 2/13/2019  4:40 PM     Author: Jessa Thomas Service: -- Author Type: --    Filed: 2/13/2019  4:40 PM Encounter Date: 2/13/2019 Status: Signed    : Jessa Thomas APPROVED:    Approval start date: 1/14/2019  Approval end date: 2/13/2020    Pharmacy has been notified of approval and will contact patient when medication is ready for pickup.

## 2021-06-16 NOTE — TELEPHONE ENCOUNTER
Telephone Encounter by Jessa Thomas at 9/12/2019 10:44 AM     Author: Jessa Thomas Service: -- Author Type: --    Filed: 9/12/2019 10:48 AM Encounter Date: 9/9/2019 Status: Signed    : Jessa Thomas APPROVED:    Approval start date:9/4/19  Approval end date:9/11/2020    Pharmacy has been notified of approval and will contact patient when medication is ready for pickup.

## 2021-06-16 NOTE — TELEPHONE ENCOUNTER
Controlled Substance Refill Request  Medication Name:   Requested Prescriptions     Pending Prescriptions Disp Refills     oxyCODONE-acetaminophen (PERCOCET/ENDOCET) 5-325 mg per tablet 240 tablet 0     Sig: Take 1-2 tablets by mouth every 6 (six) hours as needed for pain. 1 or 2 every 6 hours ( max of 8 in 24 hours)     fentaNYL (DURAGESIC) 25 mcg/hr 10 patch 0     Sig: APPLY 1 PATCH ON THE SKIN EVERY 3RD DAY     Date Last Fill: 3/12/2021  [facility]  Submit electronically to pharmacy  Controlled Substance Agreement on file:   Encounter-Level CSA Scan Date - 12/12/2018:    Scan on 12/17/2018  2:22 PM           Encounter-Level CSA Scan Date - 02/04/2016:    Scan on 2/4/2016: Controlled Substance        Last office visit:  1/12/2021

## 2021-06-17 NOTE — TELEPHONE ENCOUNTER
Controlled Substance Refill Request  Medication Name:   Requested Prescriptions     Pending Prescriptions Disp Refills     oxyCODONE-acetaminophen (PERCOCET/ENDOCET) 5-325 mg per tablet 240 tablet 0     Sig: Take 1-2 tablets by mouth every 6 (six) hours as needed for pain. 1 or 2 every 6 hours ( max of 8 in 24 hours)     fentaNYL (DURAGESIC) 25 mcg/hr 10 patch 0     Sig: APPLY 1 PATCH ON THE SKIN EVERY 3RD DAY     Date Last Fill: 4/9/2021  [facility]ed [facility]  Submit electronically to the pharmacy   Controlled Substance Agreement on file:   Encounter-Level CSA Scan Date - 12/12/2018:    Scan on 12/17/2018  2:22 PM           Encounter-Level CSA Scan Date - 02/04/2016:    Scan on 2/4/2016: Controlled Substance        Last office visit:  1/12/2021

## 2021-06-17 NOTE — TELEPHONE ENCOUNTER
Patient called back wondering on the status of this prescription just because she will be out of meds by Sunday.

## 2021-06-18 NOTE — LETTER
Letter by Brayden Mahmood MD at      Author: Brayden Mahmood MD Service: -- Author Type: --    Filed:  Encounter Date: 1/2/2019 Status: (Other)        [facility]  [address]  [place] [address]  [phone]         Lyn Russ  [address] [address]        [birth year]    Dear Lyn Russ,     At [facility] we care about your health and well-being. Your primary care provider is committed to ensuring you receive high quality care and has chosen a network of specialists to assist in providing that care. Recently Dr. Brayden Mahmood referred you to [facility] for specialty care. They have made several attempts to connect with you to assist with scheduling, however they have been unable to reach you by phone.       It is important to your overall health to follow through with the recommendation from your provider. Please call [facility] ([phone]) at your earliest convenience for assistance in scheduling an appointment.  If you have already scheduled this appointment, please disregard this notice.  Thank you for choosing [facility] for your healthcare needs.     Sincerely,     Dr. Brayden Mahmood /   [facility] Specialty Scheduling

## 2021-06-19 NOTE — LETTER
Letter by Brayden Mahmood MD at      Author: Brayden Mahmood MD Service: -- Author Type: --    Filed:  Encounter Date: 8/29/2019 Status: (Other)         Lyn Russ  [address] [address]      August 29, 2019      Dear Lyn    In reviewing your records, we have determined a gap in your preventive services. Based on your age and health history, we recommend the follow service.     ? General Physical  ? Physical with a Pap Smear  ? Colon cancer screening  ? Mammogram  ? Immunization  ? Diabetic check  ? Blood pressure/cardiovascular check  ? Asthma check  ? Cholesterol test  ? Lab work  ? Med check      If you have had the service elsewhere, please contact us so we can update our records. Please let us know if you have transferred your care to another clinic.    Please call [phone] to schedule this appointment.    We believe that a strong preventive care program, including regular physicals and follow-up care is an important part of a healthy lifestyle and we are committed to helping you maintain your health.    Thank you for choosing us as your health care provider.    Sincerely,     [facility]
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
## 2021-06-19 NOTE — LETTER
Letter by Brayden Mahmood MD at      Author: Brayden Mahmood MD Service: -- Author Type: --    Filed:  Encounter Date: 11/12/2019 Status: Signed         November 12, 2019     Patient: Lyn Russ   YOB: 1965           To Whom It May Concern:    It is in my medical opinion that Lyn Russ be excused from jury duty due to paraplegia secondary to spontaneous hemorrhage at T6-T7.  This results in chronic pain syndrome requiring narcotic pain medication which precludes her from the ability to maintain prolonged focus and attention.    If you have any questions or concerns, please don't hesitate to call.    Sincerely,        Electronically signed by Brayden Mahmood MD

## 2021-06-19 NOTE — LETTER
Letter by Brayden Mahmood MD at      Author: Brayden Mahmood MD Service: -- Author Type: --    Filed:  Encounter Date: 9/24/2019 Status: Signed         [facility]/OB  09/24/19    Patient: Lyn Russ  YOB: 1965  Medical Record Number: [id number]                                                                  Opioid / Opioid Plus Controlled Substance Agreement    I understand that my care provider has prescribed an opioid (narcotic) controlled substance to help manage my condition(s). I am taking this medicine to help me function or work. I know this is strong medicine, and that it can cause serious side effects. Opioid medicine can be sedating, addicting and may cause a dependency on the drug. They can affect my ability to drive or think, and cause depression. They need to be taken exactly as prescribed. Combining opioids with certain medicines or chemicals (such as cocaine, sedatives and tranquilizers, sleeping pills, meth) can be dangerous or even fatal. Also, if I stop opioids suddenly, I may have severe withdrawal symptoms. Last, I understand that opioids do not work for all types of pain nor for all patients. If not helpful, I may be asked to stop them.        The risks, benefits, and side effects of these medicine(s) were explained to me. I agree that:    1. I will take part in other treatments as advised by my care team. This may be psychiatry or counseling, physical therapy, behavioral therapy, group treatment or a referral to a pain clinic. I will reduce or stop my medicine when my care team tells me to do so.  2. I will take my medicines as prescribed. I will not change the dose or schedule unless my care team tells me to. There will be no refills if I run out early.  I may be contactedwithout warning and asked to complete a urine drug test or pill count at any time.   3. I will keep all my appointments, and understand this is part of the monitoring of opioids. My care team  may require an office visit for EVERY opioid/controlled substance refill. If I miss appointments or dont follow instructions, my care team may stop my medicine.  4. I will not ask other providers to prescribe controlled substances, and I will not accept controlled substances from other people. If I need another prescribed controlled substance for a new reason, I will tell my care team within 1 business day.  5. I will use one pharmacy to fill all of my controlled substance prescriptions, and it is up to me to make sure that I do not run out of my medicines on weekends or holidays. If my care team is willing to refill my opioid prescription without a visit, I must request refills only during office hours, refills may take up to 3 days to process, and it may take up to 5 to 7 days for my medicine to be mailed and ready at my pharmacy. Prescriptions will not be mailed anywhere except my pharmacy.        [id number]  Rev 12/18         Registration to scan to EHR                             Page 1 of 2               Controlled Substance Agreement Opioid        [facility] MEDICINE/OB  09/24/19  Patient: Lyn Russ  YOB: 1965  Medical Record Number: [id number]                                                                  6. I am responsible for my prescriptions. If the medicine/prescription is lost or stolen, it will not be replaced. I also agree not to share controlled substance medicines with anyone.  7. I agree to not use ANY illegal or recreational drugs. This includes marijuana, cocaine, bath salts or other drugs. I agree not to use alcohol unless my care team says I may.          I agree to give urine samples whenever asked. If I dont give a urine sample, the care team may stop my medicine.    8. If I enroll in the [place] Medical Marijuana program, I will tell my care team. I will also sign an agreement to share my medical records with my care team.   9. I will bring in my list of medicines (or my  medicine bottles) each time I come to the clinic.   10. I will tell my care team right away if I become pregnant or have a new medical problem treated outside of my regular clinic.  11. I understand that this medicine can affect my thinking and judgment. It may be unsafe for me to drive, use machinery and do dangerous tasks. I will not do any of these things until I know how the medicine affects me. If my dose changes, I will wait to see how it affects me. I will contact my care team if I have concerns about medicine side effects.    I understand that if I do not follow any of the conditions above, my prescriptions or treatment may be stopped.      I agree that my provider, clinic care team, and pharmacy may work with any city, state or federal law enforcement agency that investigates the misuse, sale, or other diversion of my controlled medicine. I will allow my provider to discuss my care with or share a copy of this agreement with any other treating provider, pharmacy or emergency room where I receive care. I agree to give up (waive) any right of privacy or confidentiality with respect to these consents.     I have read this agreement and have asked questions about anything I did not understand.      ________________________________________________________________________  Patient signature - Date/Time -  Lyn Russ                                      ________________________________________________________________________  Witness signature                                                            ________________________________________________________________________  Provider signature - Brayden Mahmood MD      [id number]  Rev 12/18         Registration to scan to EHR                         Page 2 of 2                   Controlled Substance Agreement Opioid           Page 1 of 2  Opioid Pain Medicines (also known as Narcotics)  What You Need to Know    What are opioids?   Opioids are pain medicines that  must be prescribed by a doctor.  They are also known as narcotics.    Examples are:     morphine (MS Contin, Nuvia)    oxycodone (Oxycontin)    oxycodone and acetaminophen (Percocet)    hydrocodone and acetaminophen (Vicodin, Norco)     fentanyl patch (Duragesic)     hydromorphone (Dilaudid)     methadone     What do opioids do well?   Opioids are best for short-term pain after a surgery or injury. They also work well for cancer pain. Unlike other pain medicines, they do not cause liver or kidney failure or ulcers. They may help some people with long-lasting (chronic) pain.     What do opioids NOT do well?   Opioids never get rid of pain entirely, and they do not work well for most patients with chronic pain. Opioids do not reduce swelling, one of the causes of pain. They also dont work well for nerve pain.                           For informational purposes only.  Not to replace the advice of your care provider.  Copyright [facility]. All right reserved. [employer] 156491-Sno 02/18.      Page 2 of 2    Risks and side effects   Talk to your doctor before you start or decide to keep taking one of these medicines. Side effects include:    Lowering your breathing rate enough to cause death    Overdose, including death, especially if taking higher than prescribed doses    Long-term opioid use    Worse depression symptoms; less pleasure in things you usually enjoy    Feeling tired or sluggish    Slower thoughts or cloudy thinking    Being more sensitive to pain over time; pain is harder to control    Trouble sleeping or restless sleep    Changes in hormone levels (for example, less testosterone)    Changes in sex drive or ability to have sex    Constipation    Unsafe driving    Itching and sweating    Feeling dizzy    Nausea, vomiting and dry mouth    What else should I know about opioids?  When someone takes opioids for too long or too often, they become dependent. This means that if you stop or  reduce the medicine too quickly, you will have withdrawal symptoms.    Dependence is not the same as addiction. Addiction is when people keep using a substance that harms their body, their mind or their relations with others. If you have a history of drug or alcohol abuse, taking opioids can cause a relapse.    Over time, opioids dont work as well. Most people will need higher and higher doses. The higher the dose, the more serious the side effects. We dont know the long-term effects of opioids.      Prescribed opioids aren't the best way to manage chronic pain    Other ways to manage pain include:      Ibuprofen or acetaminophen.  You should always try this first.      Treat health problems that may be causing pain.      acupuncture or massage, deep breathing, meditation, visual imagery, aromatherapy.      Use heat or ice at the pain site      Physical therapy and exercise      Stop smoking      See a counselor or therapist                                                  People who have used opioids for a long time may have a lower quality of life, worse depression, higher levels of pain and more visits to doctors.    Never share your opioids with others. Be sure to store opioids in a secure place, locked if possible.Young children can easily swallow them and overdose.     You can overdose on opioids.  Signs of overdose include decrease or loss of consciousness, slowed breathing, trouble waking and blue lips.  If someone is worried about overdose, they should call 911.    If you are at risk for overdose, you may get naloxone (Narcan, a medicine that reverses the effects of opioids.  If you overdose, a friend or family member can give you Narcan while waiting for the ambulance.  They need to know the signs of overdose and how to give Narcan.    While you're taking opioids:    Don't use alcohol or street drugs. Taking them together can cause death.    Don't take any of these medicines unless your doctor says its  okay.  Taking these with opioids can cause death.    Benzodiazepines (such as lorazepam         or diazepam)    Muscle relaxers (such as cyclobenzaprine)    sleeping pills    other opioids    Safe disposal of opioids  Find your area drug take-back program, your pharmacy mail-back program, buy a special disposal bag (such as Deterra) from your pharmacy or flush them down the toilet.  Use the guidelines at:  www.fda.gov/drugs/resourcesforyou

## 2021-06-22 NOTE — PROGRESS NOTES
Assessment/Plan:    1. Chronic pain syndrome  Chronic pain has returned to baseline following prior left distal femur comminuted fracture as well as right tibial plateau fracture.  Patient continues oxycodone acetaminophen 5/325 2 tablets 4 times daily 240 tablets/month with fentanyl 25 mcg patch every 3 days.  Controlled substance agreement form completed.  Reassess no later than 6 months.    2. 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    3. Hypothyroidism, unspecified type  Repeat TSH with recent TSH 6.41 December 8, 2017.  Continues current dose of levothyroxine 75 mcg daily.  - Thyroid Stimulating Hormone (TSH)    4. Current mild episode of major depressive disorder without prior episode (H)  Underlying history of depression with anxiety with symptoms suggestive of suboptimal control with certain situation management.  Does agree to increase paroxetine from 30 mg up to 40 mg daily and will reassess at follow-up no later than 6 months, sooner if suboptimal benefit.  PHQ 9 questionnaire 2 out of 27 and MEENA 7 questionnaire 0 out of 21 today.  - PARoxetine (PAXIL) 40 MG tablet; Take 1 tablet (40 mg total) by mouth every morning.  Dispense: 90 tablet; Refill: 3    5. Hypokalemia  Prior potassium of 3.3 July 10, 2018 and will repeat basic metabolic panel to ensure stable renal function and electrolytes.  - Basic Metabolic Panel    6. Normochromic normocytic anemia  Recent mild anemia with hemoglobin 10.5 and MCV 94 July 10, 2018.  Repeat CBC.  - HM2(CBC w/o Differential)    7. Screen for colon cancer  Colon cancer screening to be completed.  - Ambulatory referral for Colonoscopy    8. Visit for screening mammogram  Screening mammogram to be completed.  - Mammo Screening Bilateral; Future          Subjective:    Lyn Russ is seen today for follow-up.  Did review office note below regarding prior bilateral lower extremity injury after  "falling out of wheelchair resulting in distal femur comminuted fracture as well as right tibial plateau fractures.  Just got out of her last below-knee cast left lower extremity 1-1/2 weeks ago.  Still has some increased left knee swelling without significant tenderness.  Has returned to baseline chronic pain management with fentanyl patch 25 mcg every third day as well as oxycodone acetaminophen 5/325 using 2 tablets 4 times daily.  Did have mildly elevated TSH 2017 with TSH 6.41.  Also labs from the summer potassium 3.3 and hemoglobin 10.5 with MCV 94.  Needs colon cancer screening as well as screening mammogram.      Reviewed office note from 18:  Lyn Russ is seen today for hospital follow-up.  Recent admission of the [facility]  - 2018 due to fall with resulting left distal femur comminuted fracture as well as right tibial plateau fracture.  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  Was being transferred in wheelchair when he fell forward landing on legs resulting in noted fractures.  Hospitalization with decision to pursue conservative treatment with long leg cast bilateral lower extremities ×6-8 weeks due to concern with osteoporosis associated with nonweightbearing status etc.  Patient continues fentanyl patch 25 mcg daily and using Percocet 5/325 using 2 tablets every 6 hours.  Significant pain leading to some muscle spasm leading to \"bones to move\" resulting in more pain.  Baclofen causes some sedation at higher doses and only using 20 mg twice daily.  Was unable to tolerate Flexeril due to sedation.  Continues levothyroxine 88 mcg daily for thyroid replacement, paroxetine 10 mg daily for depression/anxiety, etc. comprehensive review of systems as above otherwise all negative.        \"Claire\"   Single   1 son - Jed   1 daughter - Breyton (\"Brey\")   1 granddaughter - Dre  Mom - ( 4/22/15 per phone " "message) Jeff (she is an RN) - h/o R.A.   Dad - Jose   1 older bro (middle sibling) - Moiz   1 younger sis (oldenst child) - Jahaira   Mom -  ( - AAA dissection with multiorgan failure)  No smoke   EtOH: infrequent   Surgeries:  (emergent due to nuchal cord); subsequent    Work: disabled due to spinal cord pain   Self-cath q 4-5 hours (5-6 times per day)   H/O muscle spacticity, urinary symptoms; leg swelling worse with menses   Gets diarrhea with her period (often results in UTI due to hygiene issues with diarrhea)   Hospitalizations: Dec, 1999 (age 34) spinal cord hemorrhage age T7 (\"couldn't suck stomach in, then subsequent burning)   Procedures: 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 Dasilva at [facility]   13 PA approved for lyrica 75mg indefinitely    No past surgical history on file.     No family history on file.     No past medical history on file.     Social History     Tobacco Use     Smoking status: Never Smoker     Smokeless tobacco: Never Used   Substance Use Topics     Alcohol use: Yes     Drug use: No        Current Outpatient Medications   Medication Sig Dispense Refill     baclofen (LIORESAL) 20 MG tablet Take 1 tablet (20 mg total) by mouth 2 (two) times a day. 180 tablet 3     fentaNYL (DURAGESIC) 25 mcg/hr APPLY 1 PATCH ON THE SKIN EVERY 3RD DAY. 10 patch 0     levothyroxine (SYNTHROID, LEVOTHROID) 75 MCG tablet TAKE 1 TABLET BY MOUTH DAILY AT 6 AM 90 tablet 0     multivitamin therapeutic tablet Take 1 tablet by mouth daily.       nitrofurantoin (MACRODANTIN) 50 MG capsule TAKE 1 CAPSULE BY MOUTH EVERY DAY 90 capsule 3     oxybutynin (DITROPAN) 5 MG tablet TAKE 2 TABLETS BY MOUTH THREE TIMES DAILY FOR BLADDER 540 tablet 2     oxyCODONE-acetaminophen (PERCOCET/ENDOCET) 5-325 mg per tablet Take 1-2 tablets by mouth every 6 (six) hours as needed for pain 1 or 2 every 6 hours ( max of 8 in 24 hours). 240 " tablet 0     PARoxetine (PAXIL) 40 MG tablet Take 1 tablet (40 mg total) by mouth every morning. 90 tablet 3     No current facility-administered medications for this visit.           Objective:    Vitals:    12/12/18 1633   BP: 110/80   Pulse: 92   SpO2: 98%      There is no height or weight on file to calculate BMI.    Alert.  Cooperative.  Forthcoming.  Appropriate eye contact.  He is in a wheelchair due to paraplegia.  Left greater than right knee fullness with no evidence for joint effusion however.  No calf tenderness identified.  Some foot swelling which prevents shoe wear.  No rash identified.      This note has been dictated using voice recognition software and as a result may contain minor grammatical errors and unintended word substitutions.

## 2021-06-23 NOTE — TELEPHONE ENCOUNTER
Controlled Substance Refill Request  Medication Name:   Requested Prescriptions     Pending Prescriptions Disp Refills     fentaNYL (DURAGESIC) 25 mcg/hr 10 patch 0     Sig: APPLY 1 PATCH ON THE SKIN EVERY 3RD DAY     oxyCODONE-acetaminophen (PERCOCET/ENDOCET) 5-325 mg per tablet 240 tablet 0     Sig: Take 1-2 tablets by mouth every 6 (six) hours as needed for pain. 1 or 2 every 6 hours ( max of 8 in 24 hours)     Date Last Fill: 12/17/18  Pharmacy: [facility]      Submit electronically to pharmacy  Controlled Substance Agreement Date Scanned:   Encounter-Level CSA Scan Date - 02/04/2016:    Scan on 2/4/2016: Controlled Substance (below)         Last office visit with prescriber/PCP: 12/12/2018 Brayden Mahmood MD OR same dept: 12/12/2018 Brayden Mahmood MD OR same specialty: 12/12/2018 Brayden Mahmood MD  Last physical: Visit date not found Last MTM visit: Visit date not found

## 2021-06-23 NOTE — TELEPHONE ENCOUNTER
Controlled Substance Refill Request  Medication Name:   Requested Prescriptions     Pending Prescriptions Disp Refills     oxyCODONE-acetaminophen (PERCOCET/ENDOCET) 5-325 mg per tablet 240 tablet 0     Sig: Take 1-2 tablets by mouth every 6 (six) hours as needed for pain. 1 or 2 every 6 hours ( max of 8 in 24 hours)     fentaNYL (DURAGESIC) 25 mcg/hr 10 patch 0     Sig: APPLY 1 PATCH ON THE SKIN EVERY 3RD DAY     Date Last Fill: 1/14/2019  Pharmacy: [id number]      Submit electronically to pharmacy  Controlled Substance Agreement Date Scanned:   Encounter-Level CSA Scan Date - 02/04/2016:    Scan on 2/4/2016: Controlled Substance (below)         Last office visit with prescriber/PCP: 12/12/2018 Brayden Mahmood MD OR same dept: 12/12/2018 Brayden Mahmood MD OR same specialty: 12/12/2018 Brayden Mahmood MD  Last physical: Visit date not found Last MTM visit: Visit date not found

## 2021-06-23 NOTE — TELEPHONE ENCOUNTER
Prior Authorization Request  Who s requesting:  Patient  Pharmacy Name and Location: [facility] [address] [place] [address] [address] AT [facility] [address] [place]  Medication Name: oxyCODONE-acetaminophen (PERCOCET/ENDOCET) 5-325 mg per tablet  Insurance Plan: [employer] HMO/POS through Medicare  Insurance Member ID Number:  [id number]  Informed patient that prior authorizations can take up to 10 business days for response:   Yes  Okay to leave a detailed message: Yes

## 2021-06-23 NOTE — TELEPHONE ENCOUNTER
Refill Approved    Rx renewed per Medication Renewal Policy. Medication was last renewed on 11/5/18.  .    Manda Cash, Care Connection Triage/Med Refill 2/8/2019     Requested Prescriptions   Pending Prescriptions Disp Refills     levothyroxine (SYNTHROID, LEVOTHROID) 75 MCG tablet [Pharmacy Med Name: LEVOTHYROXINE 0.075MG (75MCG) TABS] 90 tablet 0     Sig: TAKE 1 TABLET BY MOUTH DAILY AT 6AM    Thyroid Hormones Protocol Passed - 2/6/2019  9:52 AM       Passed - Provider visit in past 12 months or next 3 months    Last office visit with prescriber/PCP: 12/12/2018 Brayden Mahmood MD OR same dept: 12/12/2018 Brayden Mahmood MD OR same specialty: 12/12/2018 Brayden Mahmood MD  Last physical: Visit date not found Last MTM visit: Visit date not found   Next visit within 3 mo: Visit date not found  Next physical within 3 mo: Visit date not found  Prescriber OR PCP: Brayden Mahmood MD  Last diagnosis associated with med order: 1. Hypothyroidism, unspecified type  - levothyroxine (SYNTHROID, LEVOTHROID) 75 MCG tablet [Pharmacy Med Name: LEVOTHYROXINE 0.075MG (75MCG) TABS]; TAKE 1 TABLET BY MOUTH DAILY AT 6AM  Dispense: 90 tablet; Refill: 0    If protocol passes may refill for 12 months if within 3 months of last provider visit (or a total of 15 months).            Passed - TSH on file in past 12 months for patient age 12 & older    TSH   Date Value Ref Range Status   12/12/2018 1.21 0.30 - 5.00 uIU/mL Final

## 2021-06-24 NOTE — TELEPHONE ENCOUNTER
Controlled Substance Refill Request  Medication Name:   Requested Prescriptions     Pending Prescriptions Disp Refills     oxyCODONE-acetaminophen (PERCOCET/ENDOCET) 5-325 mg per tablet 240 tablet 0     Sig: Take 1-2 tablets by mouth every 6 (six) hours as needed for pain. 1 or 2 every 6 hours ( max of 8 in 24 hours)     fentaNYL (DURAGESIC) 25 mcg/hr 10 patch 0     Sig: APPLY 1 PATCH ON THE SKIN EVERY 3RD DAY     Date Last Fill: 2/11/2019  Pharmacy: [id number]      Submit electronically to pharmacy  Controlled Substance Agreement Date Scanned:   Encounter-Level CSA Scan Date - 02/04/2016:    Scan on 2/4/2016: Controlled Substance (below)         Last office visit with prescriber/PCP: 12/12/2018 Brayden Mahmood MD OR same dept: 12/12/2018 Brayden Mahmood MD OR same specialty: 12/12/2018 Brayden Mahmood MD  Last physical: Visit date not found Last MTM visit: Visit date not found

## 2021-06-24 NOTE — TELEPHONE ENCOUNTER
Fax received from [facility] pharmacy requesting Prior Authorization    Medication Name: Fentanyl 25mcg patch    Insurance Plan: [facility] Part D   PBM: [employer]   Patient ID Number: [id number]    Please start PA process as URGENT

## 2021-06-25 NOTE — TELEPHONE ENCOUNTER
Refill Approved    Rx renewed per Medication Renewal Policy. Medication was last renewed on 10/5/2020.  Last OV 1/12/2021.    Nathalie Kenyon, [facility] Connection Triage/Med Refill 6/15/2021     Requested Prescriptions   Pending Prescriptions Disp Refills     PARoxetine (PAXIL) 40 MG tablet [Pharmacy Med Name: PAROXETINE 40MG TABLETS] 90 tablet 2     Sig: TAKE 1 TABLET(40 MG) BY MOUTH EVERY MORNING       SSRI Refill Protocol  Passed - 6/15/2021  9:35 AM        Passed - PCP or prescribing provider visit in last year     Last office visit with prescriber/PCP: 9/24/2019 Brayden Mahmood MD OR same dept: Visit date not found OR same specialty: 9/24/2019 Brayden Mahmood MD  Last physical: Visit date not found Last MTM visit: Visit date not found   Next visit within 3 mo: Visit date not found  Next physical within 3 mo: Visit date not found  Prescriber OR PCP: Brayden Mahmood MD  Last diagnosis associated with med order: 1. Current mild episode of major depressive disorder without prior episode (H)  - PARoxetine (PAXIL) 40 MG tablet [Pharmacy Med Name: PAROXETINE 40MG TABLETS]; TAKE 1 TABLET(40 MG) BY MOUTH EVERY MORNING  Dispense: 90 tablet; Refill: 2    If protocol passes may refill for 12 months if within 3 months of last provider visit (or a total of 15 months).

## 2021-06-25 NOTE — TELEPHONE ENCOUNTER
Controlled Substance Refill Request  Medication Name:   Requested Prescriptions     Pending Prescriptions Disp Refills     oxyCODONE-acetaminophen (PERCOCET/ENDOCET) 5-325 mg per tablet 240 tablet 0     Sig: Take 1-2 tablets by mouth every 6 (six) hours as needed for pain. 1 or 2 every 6 hours ( max of 8 in 24 hours)     fentaNYL (DURAGESIC) 25 mcg/hr 10 patch 0     Sig: APPLY 1 PATCH ON THE SKIN EVERY 3RD DAY     Date Last Fill: 5/8/21  Is patient out of medication?: No, 2 days left  Patient notified refills processed within 3 business days:  Yes  [facility]ed [facility]  Submit electronically to pharmacy  Controlled Substance Agreement on file:   Encounter-Level CSA Scan Date - 12/12/2018:    Scan on 12/17/2018  2:22 PM           Encounter-Level CSA Scan Date - 02/04/2016:    Scan on 2/4/2016: Controlled Substance        Last office visit:  1/12/21

## 2021-06-26 ENCOUNTER — HEALTH MAINTENANCE LETTER (OUTPATIENT)
Age: 56
End: 2021-06-26

## 2021-07-02 ENCOUNTER — COMMUNICATION - HEALTHEAST (OUTPATIENT)
Dept: FAMILY MEDICINE | Facility: CLINIC | Age: 56
End: 2021-07-02

## 2021-07-02 DIAGNOSIS — G89.4 CHRONIC PAIN SYNDROME: ICD-10-CM

## 2021-07-02 RX ORDER — OXYCODONE AND ACETAMINOPHEN 5; 325 MG/1; MG/1
1-2 TABLET ORAL EVERY 6 HOURS PRN
Qty: 240 TABLET | Refills: 0 | Status: SHIPPED | OUTPATIENT
Start: 2021-07-02 | End: 2021-07-29

## 2021-07-02 RX ORDER — FENTANYL 25 UG/1
PATCH TRANSDERMAL
Qty: 10 PATCH | Refills: 0 | Status: SHIPPED | OUTPATIENT
Start: 2021-07-02 | End: 2021-07-29

## 2021-07-04 NOTE — TELEPHONE ENCOUNTER
Telephone Encounter by Chayito Diego CMA at 7/2/2021  8:23 AM     Author: Chayito Diego CMA Service: -- Author Type: Certified Medical Assistant    Filed: 7/2/2021  8:26 AM Encounter Date: 7/2/2021 Status: Signed    : Chayito Diego CMA (Certified Medical Assistant)       Reason for Call:  Medication or medication refill:    Do you use a [facility]?  Name of the pharmacy and phone number for the current request: [facility] #[id number] [place] [address] [address] AT [facility]    Name of the medication requested: fentaNYL (DURAGESIC) 25 mcg/hr and oxyCODONE-acetaminophen (PERCOCET/ENDOCET) 5-325 mg per tablet    Other request: NA    Can we leave a detailed message on this number? Yes    Phone number patient can be reached at:   Cell number on file:    Telephone Information:   Mobile [phone]       Best Time: Anytime    Call taken on 7/2/2021 at 8:24 AM by Chayito Diego

## 2021-07-21 ENCOUNTER — RECORDS - HEALTHEAST (OUTPATIENT)
Dept: ADMINISTRATIVE | Facility: CLINIC | Age: 56
End: 2021-07-21

## 2021-07-29 DIAGNOSIS — G89.4 CHRONIC PAIN SYNDROME: ICD-10-CM

## 2021-07-29 RX ORDER — FENTANYL 25 UG/1
PATCH TRANSDERMAL
Qty: 10 PATCH | Refills: 0 | Status: SHIPPED | OUTPATIENT
Start: 2021-07-29 | End: 2021-08-27

## 2021-07-29 RX ORDER — OXYCODONE AND ACETAMINOPHEN 5; 325 MG/1; MG/1
1-2 TABLET ORAL EVERY 6 HOURS PRN
Qty: 240 TABLET | Refills: 0 | Status: SHIPPED | OUTPATIENT
Start: 2021-07-29 | End: 2021-08-27

## 2021-07-29 NOTE — TELEPHONE ENCOUNTER
Last filled 2021  Last appointment 2021. Patient has an appointment scheduled on 2021    Patients controlled substance agreement from 2019 is .

## 2021-08-19 ENCOUNTER — VIRTUAL VISIT (OUTPATIENT)
Dept: FAMILY MEDICINE | Facility: CLINIC | Age: 56
End: 2021-08-19
Payer: COMMERCIAL

## 2021-08-19 DIAGNOSIS — G89.4 CHRONIC PAIN SYNDROME: Primary | ICD-10-CM

## 2021-08-19 DIAGNOSIS — M80.00XD AGE-RELATED OSTEOPOROSIS WITH CURRENT PATHOLOGICAL FRACTURE WITH ROUTINE HEALING, SUBSEQUENT ENCOUNTER: ICD-10-CM

## 2021-08-19 DIAGNOSIS — F41.9 ANXIETY: ICD-10-CM

## 2021-08-19 DIAGNOSIS — Z12.11 COLON CANCER SCREENING: ICD-10-CM

## 2021-08-19 DIAGNOSIS — F32.0 CURRENT MILD EPISODE OF MAJOR DEPRESSIVE DISORDER WITHOUT PRIOR EPISODE (H): ICD-10-CM

## 2021-08-19 DIAGNOSIS — E03.9 HYPOTHYROIDISM, UNSPECIFIED TYPE: ICD-10-CM

## 2021-08-19 DIAGNOSIS — E66.3 OVERWEIGHT: ICD-10-CM

## 2021-08-19 PROCEDURE — 96127 BRIEF EMOTIONAL/BEHAV ASSMT: CPT | Mod: 59 | Performed by: FAMILY MEDICINE

## 2021-08-19 PROCEDURE — 99442 PR PHYSICIAN TELEPHONE EVALUATION 11-20 MIN: CPT | Mod: 95 | Performed by: FAMILY MEDICINE

## 2021-08-19 ASSESSMENT — ANXIETY QUESTIONNAIRES
3. WORRYING TOO MUCH ABOUT DIFFERENT THINGS: NOT AT ALL
2. NOT BEING ABLE TO STOP OR CONTROL WORRYING: NOT AT ALL
1. FEELING NERVOUS, ANXIOUS, OR ON EDGE: NOT AT ALL
7. FEELING AFRAID AS IF SOMETHING AWFUL MIGHT HAPPEN: NOT AT ALL
GAD7 TOTAL SCORE: 0
6. BECOMING EASILY ANNOYED OR IRRITABLE: NOT AT ALL
GAD7 TOTAL SCORE: 0
7. FEELING AFRAID AS IF SOMETHING AWFUL MIGHT HAPPEN: NOT AT ALL
GAD7 TOTAL SCORE: 0
5. BEING SO RESTLESS THAT IT IS HARD TO SIT STILL: NOT AT ALL
8. IF YOU CHECKED OFF ANY PROBLEMS, HOW DIFFICULT HAVE THESE MADE IT FOR YOU TO DO YOUR WORK, TAKE CARE OF THINGS AT HOME, OR GET ALONG WITH OTHER PEOPLE?: NOT DIFFICULT AT ALL
4. TROUBLE RELAXING: NOT AT ALL

## 2021-08-19 ASSESSMENT — PATIENT HEALTH QUESTIONNAIRE - PHQ9
SUM OF ALL RESPONSES TO PHQ QUESTIONS 1-9: 2
10. IF YOU CHECKED OFF ANY PROBLEMS, HOW DIFFICULT HAVE THESE PROBLEMS MADE IT FOR YOU TO DO YOUR WORK, TAKE CARE OF THINGS AT HOME, OR GET ALONG WITH OTHER PEOPLE: NOT DIFFICULT AT ALL
SUM OF ALL RESPONSES TO PHQ QUESTIONS 1-9: 2

## 2021-08-19 NOTE — PROGRESS NOTES
Answers for HPI/ROS submitted by the patient on 8/19/2021  If you checked off any problems, how difficult have these problems made it for you to do your work, take care of things at home, or get along with other people?: Not difficult at all  PHQ9 TOTAL SCORE: 2  MEENA 7 TOTAL SCORE: 0    Lacey is a 56 year old who is being evaluated via a billable telephone visit.      What phone number would you like to be contacted at? [phone]  How would you like to obtain your AVS? Mail a copy    Assessment & Plan     Chronic pain syndrome  Continued use of fentanyl patch 25 mcg every 72 hours.  Oxycodone acetaminophen 5/325 using 2 tablets 3 times daily scheduled.  Anticipate reassessment in office in approximately 6 months.    Hypothyroidism, unspecified type  Hypothyroidism.  Continues levothyroxine 75 mcg daily.  Check TSH and CBC at earliest convenience.  - TSH  - CBC with platelets    Current mild episode of major depressive disorder without prior episode (H)  Continues paroxetine 40 mg daily with benefit described.  PHQ-9 questionnaire 2 out of 27.    Anxiety  MEENA-7 questionnaire 0 out of 21 while on paroxetine 40 mg daily.    Colon cancer screening  Cologuard completion earliest convenience for colon cancer screening.  - COLOGUARD(EXACT SCIENCES)    Age-related osteoporosis with current pathological fracture with routine healing, subsequent encounter  History of fracture with paraplegia bilateral lower extremities.  - Basic metabolic panel    Overweight  Fasting glucose and lipid cascade for cardiovascular risk assessment.  - Lipid panel reflex to direct LDL Fasting        No follow-ups on file.    Brayden Mahmood MD  [facility]   Lacey is a 56 year old who presents for the following health issues: Follow-up chronic health concerns.  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  Continues utilization of fentanyl patch 25 mcg every 72 hours  "as well as use of oxycodone acetaminophen 5/325 with continued use of 2 tablets 3 times daily on scheduled basis.  Doing well with this and denies concerns currently for opioid-induced constipation etc.  Mood has remained well controlled with paroxetine 40 mg daily with history of depression and anxiety.  UTI prophylaxis with nitrofurantoin 50 mg daily without current concerns for recurrent UTI.  Patient continues levothyroxine for thyroid replacement currently 75 mcg daily.  Last thyroid test about 2 years ago and patient does agree to lab testing once seen for upcoming screening mammogram.  Requesting Cologuard completion for colon cancer screening.    HPI     \"Claire\"   Single   1 son - Jed   1 daughter - Patricia (\"Brey\")   1 granddaughter - Dre   Mom - ( 4/22/15 per phone message) Jeff (she is an RN) - h/o R.A.   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 [facility]ilva at [facility]   13 PA approved for lyrica 75mg indefinitely      Review of Systems   ROS as above otherwise all negative      Objective           Vitals:  No vitals were obtained today due to virtual visit.    Physical Exam   healthy, alert and no distress  PSYCH: Alert and oriented times 3; coherent speech, normal   rate and volume, able to " articulate logical thoughts, able   to abstract reason, no tangential thoughts, no hallucinations   or delusions  Her affect is normal  RESP: No cough, no audible wheezing, able to talk in full sentences  Remainder of exam unable to be completed due to telephone visits        Phone call duration: 15 minutes

## 2021-08-20 ASSESSMENT — ANXIETY QUESTIONNAIRES: GAD7 TOTAL SCORE: 0

## 2021-08-20 ASSESSMENT — PATIENT HEALTH QUESTIONNAIRE - PHQ9: SUM OF ALL RESPONSES TO PHQ QUESTIONS 1-9: 2

## 2021-08-21 ENCOUNTER — HEALTH MAINTENANCE LETTER (OUTPATIENT)
Age: 56
End: 2021-08-21

## 2021-08-27 ENCOUNTER — TELEPHONE (OUTPATIENT)
Dept: FAMILY MEDICINE | Facility: CLINIC | Age: 56
End: 2021-08-27

## 2021-08-27 DIAGNOSIS — G89.4 CHRONIC PAIN SYNDROME: ICD-10-CM

## 2021-08-27 RX ORDER — OXYCODONE AND ACETAMINOPHEN 5; 325 MG/1; MG/1
1-2 TABLET ORAL EVERY 6 HOURS PRN
Qty: 240 TABLET | Refills: 0 | Status: SHIPPED | OUTPATIENT
Start: 2021-08-27 | End: 2021-09-24

## 2021-08-27 RX ORDER — FENTANYL 25 UG/1
PATCH TRANSDERMAL
Qty: 10 PATCH | Refills: 0 | Status: SHIPPED | OUTPATIENT
Start: 2021-08-27 | End: 2021-09-24

## 2021-08-27 NOTE — TELEPHONE ENCOUNTER
Reason for Call:  Patient is calling for refill of    oxyCODONE-acetaminophen (PERCOCET) 5-325 MG tablet    fentaNYL (DURAGESIC) 25 mcg/hr 72 hr patch    SEND TO [facility]    Detailed comments: last seen 08/19/21    Phone Number Patient can be reached at: Home number on file [phone] (home)    Best Time: any    Can we leave a detailed message on this number? YES    Call taken on 8/27/2021 at 8:24 AM by Liliana Peterson

## 2021-09-24 DIAGNOSIS — G89.4 CHRONIC PAIN SYNDROME: ICD-10-CM

## 2021-09-24 RX ORDER — FENTANYL 25 UG/1
PATCH TRANSDERMAL
Qty: 10 PATCH | Refills: 0 | Status: SHIPPED | OUTPATIENT
Start: 2021-09-24 | End: 2021-10-23

## 2021-09-24 RX ORDER — OXYCODONE AND ACETAMINOPHEN 5; 325 MG/1; MG/1
1-2 TABLET ORAL EVERY 6 HOURS PRN
Qty: 240 TABLET | Refills: 0 | Status: SHIPPED | OUTPATIENT
Start: 2021-09-24 | End: 2021-10-23

## 2021-09-25 NOTE — TELEPHONE ENCOUNTER
Routing refill request to provider for review/approval because:  Drug not on the [facility] refill protocol     Last Written Prescription Date:  8/19/2020  Last Fill Quantity: 180,  # refills: 3   Last office visit provider:  8/19/21     Requested Prescriptions   Pending Prescriptions Disp Refills     baclofen (LIORESAL) 20 MG tablet 180 tablet 3     Sig: [BACLOFEN (LIORESAL) 20 MG TABLET] TAKE 1 TABLET(20 MG) BY MOUTH TWICE DAILY       There is no refill protocol information for this order          Gokul Pinto RN 09/25/21 10:29 AM

## 2021-09-27 RX ORDER — BACLOFEN 20 MG/1
TABLET ORAL
Qty: 180 TABLET | Refills: 3 | Status: SHIPPED | OUTPATIENT
Start: 2021-09-27 | End: 2022-10-24

## 2021-10-16 ENCOUNTER — HEALTH MAINTENANCE LETTER (OUTPATIENT)
Age: 56
End: 2021-10-16

## 2021-10-22 DIAGNOSIS — G89.4 CHRONIC PAIN SYNDROME: ICD-10-CM

## 2021-10-22 NOTE — TELEPHONE ENCOUNTER
Refill Request: Fentanyl and Percocet     Last OV: 8/19/2021  Last UDT: 07/24/2018  Last CSA: 12/20/2018    Pending Prescriptions:                       Disp   Refills    fentaNYL (DURAGESIC) 25 mcg/hr 72 hr patch10 pat*0            Sig: APPLY 1 PATCH ON THE SKIN EVERY 3RD DAY    oxyCODONE-acetaminophen (PERCOCET) 5-325 *240 ta*0            Sig: Take 1-2 tablets by mouth every 6 hours as needed

## 2021-10-23 RX ORDER — FENTANYL 25 UG/1
PATCH TRANSDERMAL
Qty: 10 PATCH | Refills: 0 | Status: SHIPPED | OUTPATIENT
Start: 2021-10-23 | End: 2021-11-22

## 2021-10-23 RX ORDER — OXYCODONE AND ACETAMINOPHEN 5; 325 MG/1; MG/1
1-2 TABLET ORAL EVERY 6 HOURS PRN
Qty: 240 TABLET | Refills: 0 | Status: SHIPPED | OUTPATIENT
Start: 2021-10-23 | End: 2021-11-23

## 2021-11-22 DIAGNOSIS — G89.4 CHRONIC PAIN SYNDROME: ICD-10-CM

## 2021-11-22 NOTE — TELEPHONE ENCOUNTER
Reason for Call:  Other prescription    Detailed comments: refill request;    Fentanyl 25 mcg patch  Oxycodone-acetaminophen 5-325 MG tablet    Days left- 1  Last filled- 10.23.2021  Last OV- 08.19.2021    Phone Number Patient can be reached at: Home number on file [phone] (home)    Best Time: anytime    Can we leave a detailed message on this number? YES    Call taken on 11/22/2021 at 9:01 AM by Kyra Mendenhall

## 2021-11-23 RX ORDER — OXYCODONE AND ACETAMINOPHEN 5; 325 MG/1; MG/1
1-2 TABLET ORAL EVERY 6 HOURS PRN
Qty: 240 TABLET | Refills: 0 | Status: SHIPPED | OUTPATIENT
Start: 2021-11-23 | End: 2021-12-17

## 2021-11-23 RX ORDER — FENTANYL 25 UG/1
1 PATCH TRANSDERMAL
Qty: 10 PATCH | Refills: 0 | Status: SHIPPED | OUTPATIENT
Start: 2021-11-23 | End: 2021-12-17

## 2021-12-16 ENCOUNTER — TELEPHONE (OUTPATIENT)
Dept: FAMILY MEDICINE | Facility: CLINIC | Age: 56
End: 2021-12-16
Payer: COMMERCIAL

## 2021-12-16 DIAGNOSIS — Z23 ENCOUNTER FOR IMMUNIZATION: Primary | ICD-10-CM

## 2021-12-16 DIAGNOSIS — Z23 HIGH PRIORITY FOR 2019-NCOV VACCINE: Primary | ICD-10-CM

## 2021-12-16 NOTE — TELEPHONE ENCOUNTER
Reason for Call: Request for an order or referral:    Order or referral being requested: Order    Date needed: as soon as possible    Has the patient been seen by the PCP for this problem? NO    Additional comments: Pt is requesting an order for COVID Vaccine Booster and requesting someone (Community Paramedic) to go out to home to give it to her as she is in a wheelchair and she injured her right arm and cannot transfer from her chair and back. Please advise.    Phone number Patient can be reached at:  Home number on file [phone] (home)    Best Time:  any    Can we leave a detailed message on this number?  YES    Call taken on 12/16/2021 at 1:42 PM by Georges Kevin

## 2021-12-17 ENCOUNTER — TELEPHONE (OUTPATIENT)
Dept: FAMILY MEDICINE | Facility: CLINIC | Age: 56
End: 2021-12-17
Payer: COMMERCIAL

## 2021-12-17 DIAGNOSIS — G89.4 CHRONIC PAIN SYNDROME: ICD-10-CM

## 2021-12-17 DIAGNOSIS — Z23 ENCOUNTER FOR IMMUNIZATION: Primary | ICD-10-CM

## 2021-12-17 RX ORDER — FENTANYL 25 UG/1
1 PATCH TRANSDERMAL
Qty: 10 PATCH | Refills: 0 | Status: SHIPPED | OUTPATIENT
Start: 2021-12-17 | End: 2022-01-14

## 2021-12-17 RX ORDER — OXYCODONE AND ACETAMINOPHEN 5; 325 MG/1; MG/1
1-2 TABLET ORAL EVERY 6 HOURS PRN
Qty: 240 TABLET | Refills: 0 | Status: SHIPPED | OUTPATIENT
Start: 2021-12-17 | End: 2022-01-14

## 2021-12-17 NOTE — TELEPHONE ENCOUNTER
LM- if pt needs more assistance with scheduling with the community paramedic told to call back.  Dr. Mahmood has place the order for the pfizer booster vaccine.

## 2021-12-17 NOTE — TELEPHONE ENCOUNTER
Pt requesting a refill on pain patch and percocet to be sent to [facility] in [place] on [address].       - Also states she put a request for a booster to be given to her at home since she is unable to come to the clinic to get the vaccine and wanting to add to the order for the flu shot to be given also

## 2021-12-17 NOTE — TELEPHONE ENCOUNTER
Order placed for 3rd shot Pfizer vaccine.  Please ensure able to arrange with community paramedic program in order to administer.

## 2021-12-17 NOTE — TELEPHONE ENCOUNTER
Refills authorized.  Order placed for Flublok immunization to be given (along with COVID-19 booster) through community paramedic program.

## 2021-12-20 NOTE — TELEPHONE ENCOUNTER
"Reason for Call:  Other     Detailed comments: Can a referral be put in for the community paramedic? It looks like they need a referral so they can reach out to patient with next steps. For the referral, Enter: \"Community Paramedic \" and the referral below will populate.       Phone Number Patient can be reached at: Home number on file [phone] (home)    Best Time: any    Can we leave a detailed message on this number? YES    Call taken on 12/20/2021 at 3:16 PM by Georges Kevin      "

## 2021-12-22 ENCOUNTER — PATIENT OUTREACH (OUTPATIENT)
Dept: OTHER | Facility: CLINIC | Age: 56
End: 2021-12-22
Payer: COMMERCIAL

## 2021-12-22 DIAGNOSIS — Z23 HIGH PRIORITY FOR 2019-NCOV VACCINE: Primary | ICD-10-CM

## 2021-12-22 NOTE — PROGRESS NOTES
Community Paramedic Program Contact  [facility]/Voicemail    CP Community Health Worker Outreach  Outreach attempted x 1.  Left message on patient's voicemail with call back information and requested return call.  Plan: Community Health Worker will try to reach patient again in 3-5 business days.    Note:  Available with CP5 on 1/4/22 in the am/pm for flu shot?    Referral source: Pt's PCP  Referral reason:  Reason(s) for visit: Labs/Injections (please ensure orders have been placed in Epic)     Goals for visit(s): Other     How often should patient be seen: Other once   Preference on when patient should be seen: 1-2 Days     Comments   PCP: Brayden Mahmood  Other info that would be helpful: Patient has paraplegia. Please administer COVID booster and flu shot per orders in chart.

## 2021-12-29 DIAGNOSIS — Z23 HIGH PRIORITY FOR 2019-NCOV VACCINE: Primary | ICD-10-CM

## 2021-12-29 NOTE — PROGRESS NOTES
Community Paramedic Program Contact  [facility]/Voicemail    12/30 Addendum: Received a call back from pt confirming that she's available on January 11th at 2 pm to receive her Moderna booster shot.    CP Community Health Worker Outreach  Outreach attempted x 1.  Left message on patient's voicemail with call back information and requested return call.  Plan: Community Health Worker will try to reach patient again in 3-5 business days.    Left detailed message for pt regarding traveling COVID vaccine & booster clinic on January 11th. Told pt I'm scheduling our CP Daryl to come visit her at 2 pm at home to get the Moderna booster. Also noted that we won't be doing both boosters & flu shots during the same visit. Asked pt to call me back so we can discuss scheduling another visit with a CP for the flu shot.    Sending pt's PCP a note requesting COVID vaccine order be changed from Pfizer to Moderna.      Referral source: Pt's PCP  Referral reason:  Reason(s) for visit: Labs/Injections (please ensure orders have been placed in Epic)     Goals for visit(s): Other     How often should patient be seen: Other once   Preference on when patient should be seen: 1-2 Days     Comments   PCP: Brayden Mahmood  Other info that would be helpful: Patient has paraplegia. Please administer COVID booster and flu shot per orders in chart.

## 2022-01-11 ENCOUNTER — ALLIED HEALTH/NURSE VISIT (OUTPATIENT)
Dept: OTHER | Facility: CLINIC | Age: 57
End: 2022-01-11
Payer: COMMERCIAL

## 2022-01-11 VITALS
DIASTOLIC BLOOD PRESSURE: 78 MMHG | OXYGEN SATURATION: 97 % | RESPIRATION RATE: 14 BRPM | SYSTOLIC BLOOD PRESSURE: 112 MMHG | HEART RATE: 70 BPM | TEMPERATURE: 97.9 F

## 2022-01-11 DIAGNOSIS — Z23 ENCOUNTER FOR IMMUNIZATION: ICD-10-CM

## 2022-01-11 PROCEDURE — 99207 PR COMMUNITY PARAMEDIC - PATIENT NOT BILLABLE: CPT

## 2022-01-11 PROCEDURE — 91306 COVID-19,PF,MODERNA (18+ YRS BOOSTER .25ML): CPT

## 2022-01-11 PROCEDURE — 0064A COVID-19,PF,MODERNA (18+ YRS BOOSTER .25ML): CPT

## 2022-01-11 NOTE — PROGRESS NOTES
Community Paramedics Initial Visit  January 11, 2022 TIME:1400     Lyn Russ is a 56 year old female being seen at home for a Community Paramedic Home visit.    Present at appointment:           Chief Complaint   Patient presents with     Imm/Inj     Community Paramedic in home Covid Vaccine       Universal Utilization:      Utilization    Hospital Admissions  0             ED Visits  0             No Show Count (past year)  0                Current as of: 1/11/2022  9:32 AM              Clinical Concerns:  Current Medical Concerns:      Current Behavioral Concerns:     Education Provided to patient:      Vitals: /78   Pulse 70   Temp 97.9  F (36.6  C)   Resp 14   SpO2 97%   BMI: There is no height or weight on file to calculate BMI.       Health Maintenance Reviewed:      Clinical Pathway: None    Review of Symptoms/PE    Skin: negative  Eyes: negative  Ears/Nose/Throat: negative  Respiratory: No shortness of breath, dyspnea on exertion, cough, or hemoptysis  Cardiovascular: negative  Gastrointestinal: negative  Genitourinary: negative  Musculoskeletal: negative  Neurologic: negative  Psychiatric: negative    Medication Management:    Medications need to be refilled:     Medications set up: No  Pill Box issued: No  Scale issued: No  Flu Shot given: No  COVID Vaccine Given: Yes  Lab draw or specimen collection: No  Food box issued: No  Collaborative visit with PCP: No  Wound Care: No    Functional Status:       Living Situation:       Community Paramedics Home Safety Assessment                      Diet/Exercise/Sleep:       Transportation:           Psychosocial:            No  Face to Face in Home / Community    Today's PHQ-2 Score:      Today's PHQ-9 Score:   PHQ-9 SCORE 8/19/2021   PHQ-9 Total Score MyChart 2 (Minimal depression)   PHQ-9 Total Score 2        Today's GAD7 score:   MEENA-7 SCORE 8/19/2021   Total Score 0 (minimal anxiety)   Total Score 0            Financial/Insurance:            Resources and Interventions:  Current Resources:    ;                         HEALTH CARE GOALS:      Patient Active Problem List   Diagnosis     Ankle Joint Pain     Graves' Disease     Opioid Abuse - Continuous     Paraparesis (Lower Extremities)     Hypothyroidism     Chronic Pain Syndrome     Peripheral Neuropathy     Dry Eye Syndrome     Urinary Tract Infection     Muscle Spasm     Dermatitis     Contracture     Major depression, single episode     Urinary retention         Current Outpatient Medications:      baclofen (LIORESAL) 20 MG tablet, [BACLOFEN (LIORESAL) 20 MG TABLET] TAKE 1 TABLET(20 MG) BY MOUTH TWICE DAILY, Disp: 180 tablet, Rfl: 3     fentaNYL (DURAGESIC) 25 mcg/hr 72 hr patch, Place 1 patch onto the skin every 72 hours APPLY 1 PATCH ON THE SKIN EVERY 3RD DAY, Disp: 10 patch, Rfl: 0     levothyroxine (SYNTHROID, LEVOTHROID) 75 MCG tablet, [LEVOTHYROXINE (SYNTHROID, LEVOTHROID) 75 MCG TABLET] TAKE 1 TABLET BY MOUTH DAILY AT 6 AM, Disp: 90 tablet, Rfl: 3     multivitamin therapeutic tablet, [MULTIVITAMIN THERAPEUTIC TABLET] Take 1 tablet by mouth daily., Disp: , Rfl:      nitrofurantoin (MACRODANTIN) 50 MG capsule, [NITROFURANTOIN (MACRODANTIN) 50 MG CAPSULE] Take 1 capsule (50 mg total) by mouth daily., Disp: 90 capsule, Rfl: 3     nitroFURantoin macrocrystal (MACRODANTIN) 50 MG capsule, TAKE 1 CAPSULE(50 MG) BY MOUTH DAILY, Disp: 90 capsule, Rfl: 3     oxybutynin (DITROPAN) 5 MG tablet, [OXYBUTYNIN (DITROPAN) 5 MG TABLET] TAKE 2 TABLETS BY MOUTH THREE TIMES DAILY FOR BLADDER, Disp: 540 tablet, Rfl: 3     oxyCODONE-acetaminophen (PERCOCET) 5-325 MG tablet, Take 1-2 tablets by mouth every 6 hours as needed for moderate to severe pain, Disp: 240 tablet, Rfl: 0     PARoxetine (PAXIL) 40 MG tablet, [PAROXETINE (PAXIL) 40 MG TABLET] TAKE 1 TABLET(40 MG) BY MOUTH EVERY MORNING, Disp: 90 tablet, Rfl: 2    Plan:    Functional Status:  Comment:    Plan:     Mental Health/Cognition:  Comment:   Plan:      Health Care Goals:  Comment:     Time spent with patient: 45    The patient meets one or more of the following criteria:  * Requires services to prevent readmission to a nursing home or hospital    Acute concern/Follow-up recommendations: follow care plan    Next CP visit scheduled: 01/14/2022    Issues for Provider to follow up on: COVID vaccine given without issues. Pt agreed to Flu vaccine on 01/14/2022.    Provider follow up visit needed: TBD

## 2022-01-14 ENCOUNTER — PATIENT OUTREACH (OUTPATIENT)
Dept: OTHER | Facility: CLINIC | Age: 57
End: 2022-01-14

## 2022-01-14 DIAGNOSIS — G89.4 CHRONIC PAIN SYNDROME: ICD-10-CM

## 2022-01-14 RX ORDER — OXYCODONE AND ACETAMINOPHEN 5; 325 MG/1; MG/1
1-2 TABLET ORAL EVERY 6 HOURS PRN
Qty: 240 TABLET | Refills: 0 | Status: SHIPPED | OUTPATIENT
Start: 2022-01-14 | End: 2022-02-09

## 2022-01-14 RX ORDER — FENTANYL 25 UG/1
1 PATCH TRANSDERMAL
Qty: 10 PATCH | Refills: 0 | Status: SHIPPED | OUTPATIENT
Start: 2022-01-14 | End: 2022-02-09

## 2022-01-14 NOTE — PROGRESS NOTES
"Community Paramedic Program Contact  [facility]/Voicemail    CP Community Health Worker Outreach  Outreach attempted x 1.  Left message on patient's voicemail with call back information and requested return call.  Plan: Community Health Worker will try to reach patient again in 3-5 business days.    Pt lvm this morning asking to reschedule her visit with CP Daryl today to get her flu shot. According to pt, her daughter was called into work \"and no one will be home to open the door for Daryl.\" She asked if the CP could administer the shingles vaccine at the same time as the flu shot, and she is interested in getting both if possible.    Left detailed message for pt confirming that I canceled visit with CP today. Told her I will call next week to reschedule & let her know I need to follow up with our supervisor regarding the shingles vaccine, but that I don't think the CPs are authorized to administer. Left my direct dial and encouraged pt to call back with questions.    Notified CP that the visit was canceled today, per pt's request, and my plan for rescheduling with her next week.      "

## 2022-01-19 ENCOUNTER — PATIENT OUTREACH (OUTPATIENT)
Dept: CARE COORDINATION | Facility: CLINIC | Age: 57
End: 2022-01-19
Payer: COMMERCIAL

## 2022-01-19 NOTE — PROGRESS NOTES
Community Paramedic Program  Community Health Worker Outreach    [facility]/Voicemail    Outreach attempted x 1.      Left message on patient's voicemail with call back information and requested return call.    CHW Follow-up Plan: will try to reach patient again in 3-5 business days.    Note: Called to reschedule flu shot visit. Lvm with details about CP1's availability next week on 1/26 (pm) and 1/27 (am). Asked pt to call me back and let me know what works for her and if she's comfortable with a different CP visiting.

## 2022-01-24 ENCOUNTER — E-VISIT (OUTPATIENT)
Dept: FAMILY MEDICINE | Facility: CLINIC | Age: 57
End: 2022-01-24
Payer: COMMERCIAL

## 2022-01-24 DIAGNOSIS — Z20.822 SUSPECTED COVID-19 VIRUS INFECTION: ICD-10-CM

## 2022-01-24 DIAGNOSIS — R05.9 COUGH: Primary | ICD-10-CM

## 2022-01-24 PROCEDURE — 99421 OL DIG E/M SVC 5-10 MIN: CPT | Performed by: FAMILY MEDICINE

## 2022-01-24 NOTE — PROGRESS NOTES
Community Paramedic Program  Community Health Worker Outreach    [facility]/Voicemail    Outreach attempted x 2.      Left message on patient's voicemail with call back information and requested return call.    CHW Follow-up Plan: will try to reach patient again in 3-5 business days.    Left detailed message. Attempting to reschedule pt's CP visit so she can receive her flu shot. Let her know about CP1's availability first week in February & asked pt to call me back.

## 2022-01-25 RX ORDER — BENZONATATE 100 MG/1
100 CAPSULE ORAL 3 TIMES DAILY PRN
Qty: 30 CAPSULE | Refills: 0 | Status: SHIPPED | OUTPATIENT
Start: 2022-01-25 | End: 2022-01-31

## 2022-01-31 ENCOUNTER — VIRTUAL VISIT (OUTPATIENT)
Dept: FAMILY MEDICINE | Facility: CLINIC | Age: 57
End: 2022-01-31
Payer: COMMERCIAL

## 2022-01-31 DIAGNOSIS — R05.9 COUGH: ICD-10-CM

## 2022-01-31 DIAGNOSIS — J20.9 ACUTE BRONCHITIS, UNSPECIFIED ORGANISM: Primary | ICD-10-CM

## 2022-01-31 PROCEDURE — 99213 OFFICE O/P EST LOW 20 MIN: CPT | Mod: 95 | Performed by: FAMILY MEDICINE

## 2022-01-31 RX ORDER — BENZONATATE 100 MG/1
100 CAPSULE ORAL 3 TIMES DAILY PRN
Qty: 30 CAPSULE | Refills: 0 | Status: SHIPPED | OUTPATIENT
Start: 2022-01-31 | End: 2022-03-17

## 2022-01-31 RX ORDER — AZITHROMYCIN 250 MG/1
TABLET, FILM COATED ORAL
Qty: 6 TABLET | Refills: 0 | Status: SHIPPED | OUTPATIENT
Start: 2022-01-31 | End: 2022-02-05

## 2022-01-31 NOTE — PROGRESS NOTES
Lacey is a 57 year old who is being evaluated via a billable video visit.      How would you like to obtain your AVS? MyChart  If the video visit is dropped, the invitation should be resent by: Text to cell phone: [phone]  Will anyone else be joining your video visit? No      Video Start Time: 1:40    Assessment & Plan     Acute bronchitis, unspecified organism  We will treat with a azithromycin.  Counseled on use of medication and side effects.  Discussed use of Mucinex to help with chest congestion.  Also okay to continue with benzonatate capsules.  Refill provided.  Encouraged rest and fluids.  Follow-up if symptoms not improved with this treatment or if new concerns develop.  - azithromycin (ZITHROMAX) 250 MG tablet  Dispense: 6 tablet; Refill: 0    Cough    - benzonatate (TESSALON) 100 MG capsule  Dispense: 30 capsule; Refill: 0                   No follow-ups on file.    Giselle Rodriguez MD  [facility]   Lacey is a 57 year old who presents for the following health issues     HPI   She is evaluated today via video encounter for concern of ongoing cough.  Cough started around January 20.  It got worse and she did an E visit on January 25, 2022.  She was prescribed benzonatate capsules.  Those have been helpful for the cough.  She has in a couple of Covid test at home.  These have both been negative.  Most recent test was January 25, 2022.  Cough is productive of clear phlegm.  She has not had fevers.  She has not had shortness of breath.  She has been monitoring her oxygen.  It has been around 95%.  It goes up to 99% when she takes deep breaths.  She feels like there is a lot of fluid in her lungs.  She has fluid in her nose as well.  She is not having ear pain, sinus pain, sore throat or other head cold symptoms.  She has not recently been taking any over-the-counter cough or cold medications.  Prior to the benzonatate she was taking Mucinex and Mucinex D.  She has no  other concerns.  Review of systems is otherwise negative.        Review of Systems         Objective           Vitals:  No vitals were obtained today due to virtual visit.    Physical Exam   GENERAL: Healthy, alert and no distress  EYES: Eyes grossly normal to inspection.  No discharge or erythema, or obvious scleral/conjunctival abnormalities.  RESP: Coughs during encounter.  No visible retractions or increased work of breathing.    SKIN: Visible skin clear. No significant rash, abnormal pigmentation or lesions.  NEURO: Cranial nerves grossly intact.  Mentation and speech appropriate for age.  PSYCH: Mentation appears normal, affect normal/bright, judgement and insight intact, normal speech and appearance well-groomed.                Video-Visit Details    Type of service:  Video Visit    Video End Time:1:47    Originating Location (pt. Location): Home    Distant Location (provider location):  [facility]     Platform used for Video Visit: [employer]

## 2022-02-02 ENCOUNTER — MYC MEDICAL ADVICE (OUTPATIENT)
Dept: FAMILY MEDICINE | Facility: CLINIC | Age: 57
End: 2022-02-02
Payer: COMMERCIAL

## 2022-02-04 NOTE — PROGRESS NOTES
Community Paramedic Program  Community Health Worker Outreach    [facility]/Voicemail    Outreach attempted x 3.      Left message on patient's voicemail with call back information and requested return call.    CHW Follow-up Plan: will do no further outreaches at this time.    Routing to PCP for awareness. Was attempting to schedule CP visit so pt could receive her flu shot.

## 2022-02-09 DIAGNOSIS — G89.4 CHRONIC PAIN SYNDROME: ICD-10-CM

## 2022-02-09 RX ORDER — FENTANYL 25 UG/1
1 PATCH TRANSDERMAL
Qty: 10 PATCH | Refills: 0 | Status: SHIPPED | OUTPATIENT
Start: 2022-02-09 | End: 2022-03-09

## 2022-02-09 RX ORDER — OXYCODONE AND ACETAMINOPHEN 5; 325 MG/1; MG/1
1-2 TABLET ORAL EVERY 6 HOURS PRN
Qty: 240 TABLET | Refills: 0 | Status: SHIPPED | OUTPATIENT
Start: 2022-02-09 | End: 2022-03-09

## 2022-02-09 NOTE — TELEPHONE ENCOUNTER
Last OV: 01/31/2022  Next OV: N/A  Last CSA: 9/24/2019  Last UDS: 7/4/2018    Pending Prescriptions:                       Disp   Refills    fentaNYL (DURAGESIC) 25 mcg/hr 72 hr patch10 pat*0            Sig: Place 1 patch onto the skin every 72 hours APPLY           1 PATCH ON THE SKIN EVERY 3RD DAY    oxyCODONE-acetaminophen (PERCOCET) 5-325 *240 ta*0            Sig: Take 1-2 tablets by mouth every 6 hours as needed           for moderate to severe pain

## 2022-03-09 DIAGNOSIS — G89.4 CHRONIC PAIN SYNDROME: ICD-10-CM

## 2022-03-09 RX ORDER — FENTANYL 25 UG/1
1 PATCH TRANSDERMAL
Qty: 10 PATCH | Refills: 0 | Status: SHIPPED | OUTPATIENT
Start: 2022-03-09 | End: 2022-04-05

## 2022-03-09 RX ORDER — OXYCODONE AND ACETAMINOPHEN 5; 325 MG/1; MG/1
1-2 TABLET ORAL EVERY 6 HOURS PRN
Qty: 240 TABLET | Refills: 0 | Status: SHIPPED | OUTPATIENT
Start: 2022-03-09 | End: 2022-04-05

## 2022-03-09 NOTE — TELEPHONE ENCOUNTER
Reason for Call:  Medication or medication refill    Do you use a [facility]?  Name of the pharmacy and phone number for the current request:  [id number] [place], [place] - [address] AT [facility]  [phone]    Name of the medication requested:   - fentaNYL (DURAGESIC) 25 mcg/hr 72 hr patch   - oxyCODONE-acetaminophen (PERCOCET) 5-325 MG tablet       Other request: Patient is out    Can we leave a detailed message on this number? YES    Phone number patient can be reached at: Cell number on file:    Telephone Information:   Mobile [phone]       Best Time: Anytime    Call taken on 3/9/2022 at 9:21 AM by Chayito Diego

## 2022-03-17 ENCOUNTER — VIRTUAL VISIT (OUTPATIENT)
Dept: FAMILY MEDICINE | Facility: CLINIC | Age: 57
End: 2022-03-17
Payer: COMMERCIAL

## 2022-03-17 DIAGNOSIS — F41.9 ANXIETY: ICD-10-CM

## 2022-03-17 DIAGNOSIS — E03.9 HYPOTHYROIDISM, UNSPECIFIED TYPE: ICD-10-CM

## 2022-03-17 DIAGNOSIS — M62.81 GENERALIZED MUSCLE WEAKNESS: ICD-10-CM

## 2022-03-17 DIAGNOSIS — M25.511 ACUTE PAIN OF RIGHT SHOULDER: ICD-10-CM

## 2022-03-17 DIAGNOSIS — G89.4 CHRONIC PAIN SYNDROME: ICD-10-CM

## 2022-03-17 DIAGNOSIS — G82.20 PARAPLEGIA (H): Primary | ICD-10-CM

## 2022-03-17 DIAGNOSIS — F32.0 CURRENT MILD EPISODE OF MAJOR DEPRESSIVE DISORDER WITHOUT PRIOR EPISODE (H): ICD-10-CM

## 2022-03-17 PROCEDURE — 99213 OFFICE O/P EST LOW 20 MIN: CPT | Mod: 95 | Performed by: FAMILY MEDICINE

## 2022-03-17 ASSESSMENT — ANXIETY QUESTIONNAIRES
1. FEELING NERVOUS, ANXIOUS, OR ON EDGE: NOT AT ALL
GAD7 TOTAL SCORE: 0
6. BECOMING EASILY ANNOYED OR IRRITABLE: NOT AT ALL
7. FEELING AFRAID AS IF SOMETHING AWFUL MIGHT HAPPEN: NOT AT ALL
GAD7 TOTAL SCORE: 0
4. TROUBLE RELAXING: NOT AT ALL
GAD7 TOTAL SCORE: 0
7. FEELING AFRAID AS IF SOMETHING AWFUL MIGHT HAPPEN: NOT AT ALL
3. WORRYING TOO MUCH ABOUT DIFFERENT THINGS: NOT AT ALL
2. NOT BEING ABLE TO STOP OR CONTROL WORRYING: NOT AT ALL
5. BEING SO RESTLESS THAT IT IS HARD TO SIT STILL: NOT AT ALL

## 2022-03-17 ASSESSMENT — PATIENT HEALTH QUESTIONNAIRE - PHQ9
SUM OF ALL RESPONSES TO PHQ QUESTIONS 1-9: 2
SUM OF ALL RESPONSES TO PHQ QUESTIONS 1-9: 2
10. IF YOU CHECKED OFF ANY PROBLEMS, HOW DIFFICULT HAVE THESE PROBLEMS MADE IT FOR YOU TO DO YOUR WORK, TAKE CARE OF THINGS AT HOME, OR GET ALONG WITH OTHER PEOPLE: NOT DIFFICULT AT ALL

## 2022-03-17 NOTE — PROGRESS NOTES
Lacey is a 57 year old who is being evaluated via a billable telephone visit.      What phone number would you like to be contacted at? [phone]  How would you like to obtain your AVS? MyChart    Paraplegia (H)  Paraplegia, stable.  Home care referral for physical therapy for assistance with transfers and muscle strengthening.  - Home Care Referral    Chronic pain syndrome  Chronic pain syndrome, stable.  Will reassess in office in the next 6 months.    Hypothyroidism, unspecified type  Reviewed prior TSH results from December 12, 2018 normal with future lab orders placed August 19, 2021 and patient may schedule for lab only in order to complete prior to upcoming visit.    Current mild episode of major depressive disorder without prior episode (H)  Continues paroxetine 40 mg daily with benefit.    Anxiety  Continues paroxetine 40 mg daily with benefit.    Generalized muscle weakness  Home care referral was placed.  Virtual visit completed today.  Will confirm whether needs video visit for face-to-face documentation requirement.  - Home Care Referral    Acute pain of right shoulder  Right shoulder pain after injury in late December early January 2022.  Patient requesting home physical therapy due to difficulties with transfers and difficulty leaving her house due to underlying paraplegia.  - Home Care Referral       Subjective   Lacey is a 57 year old who presents for the following health issues     Virtual visit completed today.  Patient follow-up of underlying paraplegia and chronic pain syndrome. 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  Continues utilization of fentanyl patch 25 mcg every 72 hours as well as use of oxycodone acetaminophen 5/325 with continued use of 2 tablets 3 times daily on scheduled basis.  Doing well with this and denies concerns currently for opioid-induced constipation etc.  Mood has remained well controlled with paroxetine 40 mg daily with  history of depression and anxiety.  UTI prophylaxis with nitrofurantoin 50 mg daily without current concerns for recurrent UTI.  Patient continues levothyroxine for thyroid replacement currently 75 mcg daily. Also underlying hypothyroidism on thyroid replacement.  Depression and anxiety with benefits of paroxetine use.  Patient did have injury in late December early January 2022 in which right shoulder rotator cuff irritation resulting in making it hard to transfer as well as general muscle weakness resulting.  Wonders if she had COVID at the end of January or early February with feeling quite sick for 3-1/2 weeks and now deconditioning.  (Home physical therapy since difficulty with leaving her house etc.  Comprehensive review of systems as above otherwise all negative.    Answers for HPI/ROS submitted by the patient on 3/17/2022  How many servings of fruits and vegetables do you eat daily?: 2-3  On average, how many sweetened beverages do you drink each day (Examples: soda, juice, sweet tea, etc.  Do NOT count diet or artificially sweetened beverages)?: 2  How many minutes a day do you exercise enough to make your heart beat faster?: 9 or less  How many days a week do you exercise enough to make your heart beat faster?: 3 or less  How many days per week do you miss taking your medication?: 0  What is the reason for your visit today?: Med review            History of Present Illness       Mental Health Follow-up:                    Today's PHQ-9         PHQ-9 Total Score: 2  PHQ-9 Q9 Thoughts of better off dead/self-harm past 2 weeks :   (P) Not at all    How difficult have these problems made it for you to do your work, take care of things at home, or get along with other people: Not difficult at all    Today's MEENA-7 Score: 0    Reason for visit:  Med review    She eats 2-3 servings of fruits and vegetables daily.She consumes 2 sweetened beverage(s) daily.She exercises with enough effort to increase her heart rate  9 or less minutes per day.  She exercises with enough effort to increase her heart rate 3 or less days per week.   She is taking medications regularly.             Review of Systems   Constitutional, HEENT, cardiovascular, pulmonary, gi and gu systems are negative, except as otherwise noted.      Objective           Vitals:  No vitals were obtained today due to virtual visit.    Physical Exam   healthy, alert and no distress  PSYCH: Alert and oriented times 3; coherent speech, normal   rate and volume, able to articulate logical thoughts, able   to abstract reason, no tangential thoughts, no hallucinations   or delusions  Her affect is normal  RESP: No cough, no audible wheezing, able to talk in full sentences  Remainder of exam unable to be completed due to telephone visits                Phone call duration: 12 minutes

## 2022-03-18 ENCOUNTER — NURSE TRIAGE (OUTPATIENT)
Dept: NURSING | Facility: CLINIC | Age: 57
End: 2022-03-18
Payer: COMMERCIAL

## 2022-03-18 ASSESSMENT — ANXIETY QUESTIONNAIRES: GAD7 TOTAL SCORE: 0

## 2022-03-18 ASSESSMENT — PATIENT HEALTH QUESTIONNAIRE - PHQ9: SUM OF ALL RESPONSES TO PHQ QUESTIONS 1-9: 2

## 2022-03-18 NOTE — TELEPHONE ENCOUNTER
Provider Response to 2nd Level Triage Request    Per - I have reviewed the RN documentation. My recommendation is patient is safe to start home care services on 3/28/2022. Delay is ok.    Call placed to [facility] at [phone] EXT [phone]. Spoke to Gissel and gave verbal ok from .     Nothing further needed.

## 2022-03-18 NOTE — TELEPHONE ENCOUNTER
lNurse Triage SBAR    Is this a 2nd Level Triage? YES, LICENSED PRACTITIONER REVIEW IS REQUIRED    Situation: Gissel from [facility] ( [facility]) calling.    Background: Home care is booked out until 3/28 and would not be able to start her care until that date.    Assessment: Gissel is wondering if the patient's home care can be delayed until 3/28 when they have availability.    Protocol Recommended Disposition:   No disposition on file.    Recommendation: Please call [facility] at [phone] ext [phone] with MD recommendation for home care delay.     Routed to provider    Does the patient meet one of the following criteria for ADS visit consideration? 16+ years old, with an [facility] PCP     TIP  Providers, please consider if this condition is appropriate for management at one of our Acute and Diagnostic Services sites.     If patient is a good candidate, please use dotphrase <dot>triageresponse and select Refer to ADS to document.    Addendum: Called [facility] at 1358 to f/u on 2LT. Spoke with Jolie, who states she will ensure Dr. Mahmood contacts [facility] about the patient's home care.      Payton Nettles, RN, BSN  [facility]   Triage Nurse Advisor      Additional Information    Nursing judgment    Protocols used: INFORMATION ONLY CALL - NO TRIAGE-A-[place]

## 2022-03-24 ENCOUNTER — TELEPHONE (OUTPATIENT)
Dept: FAMILY MEDICINE | Facility: CLINIC | Age: 57
End: 2022-03-24
Payer: COMMERCIAL

## 2022-03-24 NOTE — TELEPHONE ENCOUNTER
Sampson WASHINGTON from [facility] calling regarding Lacey, he has tried to contact patient 4 time with no success,  I also tried today and left a message with the number to [facility], is they are not in contact by tomorrow morning case will be closed

## 2022-04-01 ENCOUNTER — NURSE TRIAGE (OUTPATIENT)
Dept: NURSING | Facility: CLINIC | Age: 57
End: 2022-04-01
Payer: COMMERCIAL

## 2022-04-01 ENCOUNTER — TELEPHONE (OUTPATIENT)
Dept: FAMILY MEDICINE | Facility: CLINIC | Age: 57
End: 2022-04-01
Payer: COMMERCIAL

## 2022-04-01 DIAGNOSIS — F32.0 CURRENT MILD EPISODE OF MAJOR DEPRESSIVE DISORDER WITHOUT PRIOR EPISODE (H): ICD-10-CM

## 2022-04-01 RX ORDER — PAROXETINE 40 MG/1
TABLET, FILM COATED ORAL
Qty: 90 TABLET | Refills: 3 | Status: SHIPPED | OUTPATIENT
Start: 2022-04-01 | End: 2022-10-13

## 2022-04-01 NOTE — TELEPHONE ENCOUNTER
Patient is following up on form she mailed to clinic for PCP to complete on her disability.  Disability paperwork was mailed about two weeks ago.    She needs to know if it has been completed and returned back to her/  Patient would like a call back with update.     Call back number is , patient shares that she is disabled and it can be hard for her to get to phone.  Requests that a message is left on her voice mail.

## 2022-04-01 NOTE — TELEPHONE ENCOUNTER
RN triage   Call from [facility] RN - Home Care   RN and PT has not been able to reach pt -- several attempts   Home Care has not been able to start / do intake visit     Will try again Monday     Any advice?    Sarah Velazco RN  [facility]  Triage Nurse Advisor      Additional Information    Nursing judgment    Protocols used: INFORMATION ONLY CALL - NO TRIAGE-A-OH

## 2022-04-01 NOTE — TELEPHONE ENCOUNTER
Requested Medication:   Pending Prescriptions:                       Disp   Refills    PARoxetine (PAXIL) 40 MG tablet           90 tab*2             Last Refill:  6/15/2021  Last Office Visit:  3/17/2022  Next Appointment with Provider:  Visit date not found

## 2022-04-05 ENCOUNTER — TELEPHONE (OUTPATIENT)
Dept: FAMILY MEDICINE | Facility: CLINIC | Age: 57
End: 2022-04-05
Payer: COMMERCIAL

## 2022-04-05 DIAGNOSIS — G89.4 CHRONIC PAIN SYNDROME: ICD-10-CM

## 2022-04-05 RX ORDER — OXYCODONE AND ACETAMINOPHEN 5; 325 MG/1; MG/1
1-2 TABLET ORAL EVERY 6 HOURS PRN
Qty: 240 TABLET | Refills: 0 | Status: SHIPPED | OUTPATIENT
Start: 2022-04-05 | End: 2022-05-03

## 2022-04-05 RX ORDER — FENTANYL 25 UG/1
1 PATCH TRANSDERMAL
Qty: 10 PATCH | Refills: 0 | Status: SHIPPED | OUTPATIENT
Start: 2022-04-05 | End: 2022-05-03

## 2022-04-05 NOTE — TELEPHONE ENCOUNTER
Reason for Call:  Medication refill:    Do you use a [facility]? No  Name of the pharmacy and phone number for the current request:  [facility]  on [address]  Name of the medication requested:   Oxycodone-acetaminophen 5-325 mg tablet  Fentanyl 25 mcg/hr 72 hr patch    Last visit with PCP = virtual 03/17/2022     Call taken on 4/5/2022 at 8:08 AM by Tavia Salas

## 2022-04-08 NOTE — TELEPHONE ENCOUNTER
Forms are completed and faxed to [facility] at [phone]. A copy was placed in the mail. Patient informed. Copy of forms in our office in case there is a fax error.

## 2022-05-03 ENCOUNTER — MYC MEDICAL ADVICE (OUTPATIENT)
Dept: FAMILY MEDICINE | Facility: CLINIC | Age: 57
End: 2022-05-03
Payer: COMMERCIAL

## 2022-05-03 ENCOUNTER — MYC REFILL (OUTPATIENT)
Dept: FAMILY MEDICINE | Facility: CLINIC | Age: 57
End: 2022-05-03
Payer: COMMERCIAL

## 2022-05-03 DIAGNOSIS — G89.4 CHRONIC PAIN SYNDROME: ICD-10-CM

## 2022-05-03 DIAGNOSIS — E03.9 HYPOTHYROIDISM, UNSPECIFIED TYPE: ICD-10-CM

## 2022-05-03 RX ORDER — LEVOTHYROXINE SODIUM 75 UG/1
TABLET ORAL
Qty: 90 TABLET | Refills: 3 | Status: SHIPPED | OUTPATIENT
Start: 2022-05-03 | End: 2023-05-11

## 2022-05-03 RX ORDER — LEVOTHYROXINE SODIUM 75 UG/1
TABLET ORAL
Qty: 90 TABLET | Refills: 3 | Status: CANCELLED | OUTPATIENT
Start: 2022-05-03

## 2022-05-03 RX ORDER — BACLOFEN 20 MG/1
TABLET ORAL
Qty: 180 TABLET | Refills: 3 | Status: CANCELLED | OUTPATIENT
Start: 2022-05-03

## 2022-05-03 NOTE — TELEPHONE ENCOUNTER
Medical message sent to patient. If patient needs a refill on zpack, an appointment is needed.Payton Pinedo RN on 5/3/2022 at 10:17 AM

## 2022-05-03 NOTE — TELEPHONE ENCOUNTER
"Routing refill request to provider for review/approval because:  Labs not current:  TSH    Last Written Prescription Date:  4/5/21  Last Fill Quantity: 90,  # refills: 3   Last office visit provider:  3/17/22     Requested Prescriptions   Pending Prescriptions Disp Refills     baclofen (LIORESAL) 20 MG tablet 180 tablet 3     Sig: [BACLOFEN (LIORESAL) 20 MG TABLET] TAKE 1 TABLET(20 MG) BY MOUTH TWICE DAILY       There is no refill protocol information for this order        levothyroxine (SYNTHROID/LEVOTHROID) 75 MCG tablet 90 tablet 3       Thyroid Protocol Failed - 5/3/2022  2:03 PM        Failed - Normal TSH on file in past 12 months     Recent Labs   Lab Test 12/12/18  1716   TSH 1.21              Passed - Patient is 12 years or older        Passed - Recent (12 mo) or future (30 days) visit within the authorizing provider's specialty     Patient has had an office visit with the authorizing provider or a provider within the authorizing providers department within the previous 12 mos or has a future within next 30 days. See \"Patient Info\" tab in inbasket, or \"Choose Columns\" in Meds & Orders section of the refill encounter.              Passed - Medication is active on med list        Passed - No active pregnancy on record     If patient is pregnant or has had a positive pregnancy test, please check TSH.          Passed - No positive pregnancy test in past 12 months     If patient is pregnant or has had a positive pregnancy test, please check TSH.               Moiz Bullard RN 05/03/22 2:10 PM  "

## 2022-05-03 NOTE — TELEPHONE ENCOUNTER
Chief Complaint   Patient presents with     Refill Request     Levothyroxine and Baclofen.  Payton Pinedo RN on 5/3/2022 at 2:02 PM

## 2022-05-04 RX ORDER — OXYCODONE AND ACETAMINOPHEN 5; 325 MG/1; MG/1
1-2 TABLET ORAL EVERY 6 HOURS PRN
Qty: 240 TABLET | Refills: 0 | Status: SHIPPED | OUTPATIENT
Start: 2022-05-04 | End: 2022-05-31

## 2022-05-04 RX ORDER — FENTANYL 25 UG/1
1 PATCH TRANSDERMAL
Qty: 10 PATCH | Refills: 0 | Status: SHIPPED | OUTPATIENT
Start: 2022-05-04 | End: 2022-05-31

## 2022-05-31 DIAGNOSIS — G89.4 CHRONIC PAIN SYNDROME: ICD-10-CM

## 2022-05-31 RX ORDER — OXYCODONE AND ACETAMINOPHEN 5; 325 MG/1; MG/1
1-2 TABLET ORAL EVERY 6 HOURS PRN
Qty: 240 TABLET | Refills: 0 | Status: SHIPPED | OUTPATIENT
Start: 2022-05-31 | End: 2022-06-28

## 2022-05-31 RX ORDER — FENTANYL 25 UG/1
1 PATCH TRANSDERMAL
Qty: 10 PATCH | Refills: 0 | Status: SHIPPED | OUTPATIENT
Start: 2022-05-31 | End: 2022-06-28

## 2022-05-31 NOTE — TELEPHONE ENCOUNTER
Requested Medication:   Pending Prescriptions:                       Disp   Refills    fentaNYL (DURAGESIC) 25 mcg/hr 72 hr patch10 pat*0            Sig: Place 1 patch onto the skin every 72 hours APPLY           1 PATCH ON THE SKIN EVERY 3RD DAY    oxyCODONE-acetaminophen (PERCOCET) 5-325 *240 ta*0            Sig: Take 1-2 tablets by mouth every 6 hours as needed           for moderate to severe pain       Last Refill:  5/3/2022    Last Office Visit:  Visit date not found     Next Appointment with Provider:  Visit date not found   Future Appointments 5/31/2022 - 11/27/2022    None          Clinic visit frequency required: Q 3 months    Controlled substance agreement on file: No.    Urine Drug Screen on file:  No

## 2022-06-28 DIAGNOSIS — G89.4 CHRONIC PAIN SYNDROME: ICD-10-CM

## 2022-06-28 RX ORDER — FENTANYL 25 UG/1
1 PATCH TRANSDERMAL
Qty: 10 PATCH | Refills: 0 | Status: SHIPPED | OUTPATIENT
Start: 2022-06-28 | End: 2022-07-26

## 2022-06-28 RX ORDER — OXYCODONE AND ACETAMINOPHEN 5; 325 MG/1; MG/1
1-2 TABLET ORAL EVERY 6 HOURS PRN
Qty: 240 TABLET | Refills: 0 | Status: SHIPPED | OUTPATIENT
Start: 2022-06-28 | End: 2022-07-26

## 2022-07-25 DIAGNOSIS — G89.4 CHRONIC PAIN SYNDROME: ICD-10-CM

## 2022-07-26 RX ORDER — FENTANYL 25 UG/1
1 PATCH TRANSDERMAL
Qty: 10 PATCH | Refills: 0 | Status: SHIPPED | OUTPATIENT
Start: 2022-07-26 | End: 2022-08-22

## 2022-07-26 RX ORDER — OXYCODONE AND ACETAMINOPHEN 5; 325 MG/1; MG/1
1-2 TABLET ORAL EVERY 6 HOURS PRN
Qty: 240 TABLET | Refills: 0 | Status: SHIPPED | OUTPATIENT
Start: 2022-07-26 | End: 2022-08-22

## 2022-08-10 ENCOUNTER — MYC MEDICAL ADVICE (OUTPATIENT)
Dept: FAMILY MEDICINE | Facility: CLINIC | Age: 57
End: 2022-08-10

## 2022-08-10 DIAGNOSIS — G82.20 PARAPLEGIA (H): Primary | ICD-10-CM

## 2022-08-10 NOTE — TELEPHONE ENCOUNTER
Patient is requesting a referral to [facility] so that she can have a PCA.    Patient's last visit was a virtual visit on 03/17/2022. Patient has not had an office visit in at least two years.    Marielle DE LA ROSA RN

## 2022-08-17 NOTE — TELEPHONE ENCOUNTER
Routing to pcp to advise on order as patient request.  Payton Pinedo RN on 8/17/2022 at 12:02 PM

## 2022-08-17 NOTE — TELEPHONE ENCOUNTER
"Order signed for home care in order to receive PCA services due to known paraparesis (lower extremities).  Please ensure that this gets set up for 8/21/22 per patient request.  Does she need \"face-to-face\" or does this order suffice?  "

## 2022-08-18 ENCOUNTER — TELEPHONE (OUTPATIENT)
Dept: FAMILY MEDICINE | Facility: CLINIC | Age: 57
End: 2022-08-18

## 2022-08-22 DIAGNOSIS — G89.4 CHRONIC PAIN SYNDROME: ICD-10-CM

## 2022-08-22 RX ORDER — FENTANYL 25 UG/1
1 PATCH TRANSDERMAL
Qty: 10 PATCH | Refills: 0 | Status: SHIPPED | OUTPATIENT
Start: 2022-08-22 | End: 2022-09-19

## 2022-08-22 RX ORDER — OXYCODONE AND ACETAMINOPHEN 5; 325 MG/1; MG/1
1-2 TABLET ORAL EVERY 6 HOURS PRN
Qty: 240 TABLET | Refills: 0 | Status: SHIPPED | OUTPATIENT
Start: 2022-08-22 | End: 2022-09-19

## 2022-08-22 NOTE — TELEPHONE ENCOUNTER
Last clinic visit:  03/17/2022    Clinic visit frequency required: Q 3 months    Next clinic visit: N/A    Controlled substance agreement on file: Yes:  Date 9/24/2019.    Urine Drug Screen on file:  Yes; over due. 07/24/2018    Pending Prescriptions:                       Disp   Refills    fentaNYL (DURAGESIC) 25 mcg/hr 72 hr patch10 pat*0            Sig: Place 1 patch onto the skin every 72 hours APPLY           1 PATCH ON THE SKIN EVERY 3RD DAY    oxyCODONE-acetaminophen (PERCOCET) 5-325 *240 ta*0            Sig: Take 1-2 tablets by mouth every 6 hours as needed           for moderate to severe pain

## 2022-08-22 NOTE — TELEPHONE ENCOUNTER
Reason for Call:  Medication or medication refill:    Do you use a [facility]?  Name of the pharmacy and phone number for the current request:  On file     Name of the medication requested: oxyCODONE-acetaminophen (PERCOCET) 5-325 MG tablet/  fentaNYL (DURAGESIC) 25 mcg/hr 72 hr patch    Other request: n/a    Can we leave a detailed message on this number? YES    Phone number patient can be reached at: Cell number on file:    Telephone Information:   Mobile [phone]       Best Time: any     Call taken on 8/22/2022 at 7:33 AM by Nany Ramos

## 2022-09-19 ENCOUNTER — TELEPHONE (OUTPATIENT)
Dept: FAMILY MEDICINE | Facility: CLINIC | Age: 57
End: 2022-09-19

## 2022-09-19 ENCOUNTER — MYC REFILL (OUTPATIENT)
Dept: FAMILY MEDICINE | Facility: CLINIC | Age: 57
End: 2022-09-19

## 2022-09-19 DIAGNOSIS — G89.4 CHRONIC PAIN SYNDROME: ICD-10-CM

## 2022-09-19 NOTE — TELEPHONE ENCOUNTER
Reason for call:  Other   Patient called regarding (reason for call): appointment  Additional comments: Patient has an appointment scheduled for 10/13/22 for a medication check for the fentanyl and oxycodone prescriptions, but the patient is in need of refills of these medications prior to the appointment scheduled on 10/13/22.    Phone number to reach patient:  Home number on file [phone] (home)    Best Time:  Any    Can we leave a detailed message on this number?  YES    Travel screening: Not Applicable

## 2022-09-20 RX ORDER — FENTANYL 25 UG/1
1 PATCH TRANSDERMAL
Qty: 10 PATCH | Refills: 0 | Status: SHIPPED | OUTPATIENT
Start: 2022-09-20 | End: 2022-10-17

## 2022-09-20 RX ORDER — OXYCODONE AND ACETAMINOPHEN 5; 325 MG/1; MG/1
1-2 TABLET ORAL EVERY 6 HOURS PRN
Qty: 240 TABLET | Refills: 0 | Status: SHIPPED | OUTPATIENT
Start: 2022-09-20 | End: 2022-10-17

## 2022-09-20 NOTE — TELEPHONE ENCOUNTER
Pt checking on refill request. Please review and fill as appropriate. Pt has an appt on 10/13/22 in clinic.

## 2022-09-25 ENCOUNTER — HEALTH MAINTENANCE LETTER (OUTPATIENT)
Age: 57
End: 2022-09-25

## 2022-10-13 ENCOUNTER — VIRTUAL VISIT (OUTPATIENT)
Dept: FAMILY MEDICINE | Facility: CLINIC | Age: 57
End: 2022-10-13
Payer: COMMERCIAL

## 2022-10-13 VITALS — SYSTOLIC BLOOD PRESSURE: 123 MMHG | HEART RATE: 76 BPM | DIASTOLIC BLOOD PRESSURE: 70 MMHG | TEMPERATURE: 98 F

## 2022-10-13 DIAGNOSIS — R41.840 CONCENTRATION DEFICIT: ICD-10-CM

## 2022-10-13 DIAGNOSIS — E66.3 OVERWEIGHT: ICD-10-CM

## 2022-10-13 DIAGNOSIS — Z23 ENCOUNTER FOR IMMUNIZATION: ICD-10-CM

## 2022-10-13 DIAGNOSIS — G89.4 CHRONIC PAIN SYNDROME: ICD-10-CM

## 2022-10-13 DIAGNOSIS — G82.20 PARAPLEGIA (H): Primary | ICD-10-CM

## 2022-10-13 DIAGNOSIS — F11.10 OPIOID ABUSE, CONTINUOUS (H): ICD-10-CM

## 2022-10-13 DIAGNOSIS — Z29.89 NEED FOR PROPHYLAXIS AGAINST URINARY TRACT INFECTION: ICD-10-CM

## 2022-10-13 DIAGNOSIS — E03.9 HYPOTHYROIDISM, UNSPECIFIED TYPE: ICD-10-CM

## 2022-10-13 DIAGNOSIS — F32.0 CURRENT MILD EPISODE OF MAJOR DEPRESSIVE DISORDER WITHOUT PRIOR EPISODE (H): ICD-10-CM

## 2022-10-13 DIAGNOSIS — R60.9 EDEMA, UNSPECIFIED TYPE: ICD-10-CM

## 2022-10-13 PROCEDURE — 99443 PR PHYSICIAN TELEPHONE EVALUATION 21-30 MIN: CPT | Mod: 95 | Performed by: FAMILY MEDICINE

## 2022-10-13 RX ORDER — PAROXETINE 20 MG/1
TABLET, FILM COATED ORAL
Qty: 30 TABLET | Refills: 1 | Status: SHIPPED | OUTPATIENT
Start: 2022-10-13 | End: 2022-11-16

## 2022-10-13 RX ORDER — BUPROPION HYDROCHLORIDE 150 MG/1
TABLET ORAL
Qty: 60 TABLET | Refills: 3 | Status: SHIPPED | OUTPATIENT
Start: 2022-10-13 | End: 2023-07-26

## 2022-10-13 RX ORDER — FUROSEMIDE 20 MG
20 TABLET ORAL DAILY PRN
Qty: 30 TABLET | Refills: 3 | Status: SHIPPED | OUTPATIENT
Start: 2022-10-13 | End: 2023-02-08

## 2022-10-13 ASSESSMENT — PATIENT HEALTH QUESTIONNAIRE - PHQ9
SUM OF ALL RESPONSES TO PHQ QUESTIONS 1-9: 5
SUM OF ALL RESPONSES TO PHQ QUESTIONS 1-9: 5
10. IF YOU CHECKED OFF ANY PROBLEMS, HOW DIFFICULT HAVE THESE PROBLEMS MADE IT FOR YOU TO DO YOUR WORK, TAKE CARE OF THINGS AT HOME, OR GET ALONG WITH OTHER PEOPLE: NOT DIFFICULT AT ALL

## 2022-10-13 NOTE — PROGRESS NOTES
Lacey is a 57 year old who is being evaluated via a billable telephone visit.      What phone number would you like to be contacted at? [phone]  How would you like to obtain your AVS? MyChart    Paraplegia (H)  Paraplegia reviewed.  Will have community paramedic call out to her residence in order to provide shots including seasonal flu shot, COVID-19 Pfizer bivalent updated booster, Adacel booster and Shingrix No. 1.  Patient will then receive Shingrix booster in 2 to 6 months following.  - Community Paramedic Referral  - Basic metabolic panel    Chronic pain syndrome  Chronic pain syndrome.  Continues use of fentanyl patch 25 mcg every 72 hours plus oxycodone acetaminophen 5/325 up to 2 tablets 3 times daily.    Hypothyroidism, unspecified type  Will update TSH at earliest convenience well on levothyroxine 75 mcg daily.  - TSH    Opioid abuse, continuous (H)  As above.    Need for prophylaxis against urinary tract infection  Utilizes nitrofurantoin 50 mg daily for UTI prophylaxis per    Encounter for immunization  Will complete immunizations at earliest convenience through community paramedic program.  - TDAP VACCINE (Adacel, Boostrix)  - ZOSTER VACCINE RECOMBINANT ADJUVANTED (SHINGRIX)  - INFLUENZA QUAD, RECOMBINANT, P-FREE (RIV4) (FLUBLOK) AGE 50-64 [[id number]]  - COVID-19,PF,PFIZER BOOSTER BIVALENT (12+YRS)  - Community Paramedic Referral    Concentration deficit  Concentration deficit.  Wean from paroxetine as noted and utilize bupropion  mg daily in the morning x1 week then 300 mg daily in the morning.  - buPROPion (WELLBUTRIN XL) 150 MG 24 hr tablet  Dispense: 60 tablet; Refill: 3    Current mild episode of major depressive disorder without prior episode (H)  Patient does not feel depression accurate diagnosis.  States paroxetine had been prescribed in past for PMS symptoms.  Will wean from paroxetine from 40 mg down to 30 mg daily x1 week, 20 mg daily x1 week then 10 mg daily x1 week before  discontinuing and then may initiate bupropion as above.  - PARoxetine (PAXIL) 20 MG tablet  Dispense: 30 tablet; Refill: 1    Overweight  Check fasting glucose and lipid cascade at earliest convenience.  - Lipid panel reflex to direct LDL Fasting    Edema, unspecified type  Furosemide 20 mg each morning on a as needed basis if fluid retention associated with menstrual cycle etc.  - furosemide (LASIX) 20 MG tablet  Dispense: 30 tablet; Refill: 3           Subjective   Lacey is a 57 year old presenting for the following health issues:  Recheck Medication      History of Present Illness       Reason for visit:  Med update    She eats 2-3 servings of fruits and vegetables daily.She exercises with enough effort to increase her heart rate 9 or less minutes per day.  She exercises with enough effort to increase her heart rate 3 or less days per week.   She is taking medications regularly.    Today's PHQ-9         PHQ-9 Total Score: 5    PHQ-9 Q9 Thoughts of better off dead/self-harm past 2 weeks :   Not at all    How difficult have these problems made it for you to do your work, take care of things at home, or get along with other people: Not difficult at all             Review of Systems   Constitutional, HEENT, cardiovascular, pulmonary, GI, , musculoskeletal, neuro, skin, endocrine and psych systems are negative, except as otherwise noted.      Objective           Vitals:  No vitals were obtained today due to virtual visit.    Physical Exam   healthy, alert and no distress  PSYCH: Alert and oriented times 3; coherent speech, normal   rate and volume, able to articulate logical thoughts, able   to abstract reason, no tangential thoughts, no hallucinations   or delusions  Her affect is normal  RESP: No cough, no audible wheezing, able to talk in full sentences  Remainder of exam unable to be completed due to telephone visits                Phone call duration: 25 minutes

## 2022-10-17 DIAGNOSIS — G89.4 CHRONIC PAIN SYNDROME: ICD-10-CM

## 2022-10-17 RX ORDER — OXYCODONE AND ACETAMINOPHEN 5; 325 MG/1; MG/1
1-2 TABLET ORAL EVERY 6 HOURS PRN
Qty: 240 TABLET | Refills: 0 | Status: SHIPPED | OUTPATIENT
Start: 2022-10-17 | End: 2022-11-14

## 2022-10-17 RX ORDER — FENTANYL 25 UG/1
1 PATCH TRANSDERMAL
Qty: 10 PATCH | Refills: 0 | Status: SHIPPED | OUTPATIENT
Start: 2022-10-17 | End: 2022-11-14

## 2022-10-17 NOTE — TELEPHONE ENCOUNTER
Requested Medication:   Pending Prescriptions:                       Disp   Refills    fentaNYL (DURAGESIC) 25 mcg/hr 72 hr patch10 pat*0            Sig: Place 1 patch onto the skin every 72 hours APPLY           1 PATCH ON THE SKIN EVERY 3RD DAY    oxyCODONE-acetaminophen (PERCOCET) 5-325 *240 ta*0            Sig: Take 1-2 tablets by mouth every 6 hours as needed           for moderate to severe pain       Last Refill:  9/20/22    Last Office Visit:  Visit date not found     Next Appointment with Provider:  Visit date not found   Future Appointments 10/17/2022 - 4/15/2023    None          Clinic visit frequency required: Q 3 months    Controlled substance agreement on file: No.    Urine Drug Screen on file:  No

## 2022-10-21 DIAGNOSIS — G89.4 CHRONIC PAIN SYNDROME: ICD-10-CM

## 2022-10-24 ENCOUNTER — PATIENT OUTREACH (OUTPATIENT)
Dept: CARE COORDINATION | Facility: CLINIC | Age: 57
End: 2022-10-24

## 2022-10-24 RX ORDER — BACLOFEN 20 MG/1
TABLET ORAL
Qty: 180 TABLET | Refills: 3 | Status: SHIPPED | OUTPATIENT
Start: 2022-10-24 | End: 2023-10-23

## 2022-10-24 NOTE — PROGRESS NOTES
Community Paramedic Program  Community Health Worker Outreach    UTC/Voicemail    Outreach attempted x 1.      Left message on patient's voicemail with call back information and requested return call.    CHW Follow-up Plan: will try to reach patient again in 1-2 business days.    Sending MyChart message to see if I can connect with pt.     Note: Available with CP1 on 11/3 at 1 pm?

## 2022-10-24 NOTE — TELEPHONE ENCOUNTER
Routing refill request to provider for review/approval because:  Drug not on the G refill protocol     Last Written Prescription Date:  9/27/21  Last Fill Quantity: 180,  # refills: 3   Last office visit provider:  10/13/22     Requested Prescriptions   Pending Prescriptions Disp Refills     baclofen (LIORESAL) 20 MG tablet [Pharmacy Med Name: BACLOFEN 20MG TABLETS] 180 tablet 3     Sig: TAKE 1 TABLET BY MOUTH TWICE DAILY       There is no refill protocol information for this order          Ivon Rai 10/23/22 7:41 PM

## 2022-10-27 NOTE — PROGRESS NOTES
Community Paramedic Program  Community Health Worker Outreach    UTC/Voicemail    Outreach attempted x 2.      Left message on patient's voicemail with call back information and requested return call.    CHW Follow-up Plan: will try to reach patient again in 1-2 business days.    Note: Available with CP1 on 11/3 at 1 pm?

## 2022-10-31 NOTE — PROGRESS NOTES
Community Paramedic Program  Community Health Worker Outreach    UTC/Voicemail    Outreach attempted x 3.      Left message on patient's voicemail with call back information and requested return call.    CHW Follow-up Plan: will do no further outreaches at this time.    Keeping pt on our lists for flu shot and bivalent COVID booster. I will attempt to reach pt again in November to schedule a CP visit during one of our traveling COVID vaccine & booster clinics. Routing to pt's PCP for awareness.

## 2022-11-02 ENCOUNTER — PATIENT OUTREACH (OUTPATIENT)
Dept: CARE COORDINATION | Facility: CLINIC | Age: 57
End: 2022-11-02

## 2022-11-02 NOTE — PROGRESS NOTES
Community Paramedic Program  Community Health Worker Outreach    UTC/Voicemail    Outreach attempted x 1.      Left message on patient's voicemail with call back information and requested return call.    CHW Follow-up Plan: will try to reach patient again in 1-2 business days.    Note: Available with CP5 on 11/8 am?    Referral source: Pt's PCP    Reason(s) for visit: Labs/Injections (please ensure orders have been placed in Epic)    Goals for visit(s): Other    How often should patient be seen: Other injextions, then needs follow-up Shingrix shot in 2-6 months   Preference on when patient should be seen: 3-7 Days

## 2022-11-07 NOTE — PROGRESS NOTES
"[facility]  Flu Shot     Signed order in pt's chart: yes     Visit scheduled: Wednesday, November 9th / 3:30-4 pm / CP [place] (date/time/CP)     Additional information:   Spoke with pt. Thanked her for returning my call. Pt said she's been having issues with getting alerts of voicemail messages on her cell phone.     Pt agreed with my offer to schedule a CP visit for her flu shot and for a lab draw. Per pt's request, reviewed lab orders with her and confirmed that the lipid lab is fasting. Pt said she discussed fasting for 10 hours before the lab draw with her PCP during their most recent virtual visit. I confirmed that pt can drink water and coffee, without sugar or cream, beforehand.     Confirmed pt's home address. She wasn't sure if anyone would be home to open the door for the CP during the visit but said \"I will call and leave you a message once I know if someone will be here on Wednesday.\" Pt said that she is able to unlock her front door via her phone if no one else is present when the CP comes. Pt has a cat at home \"who will run to the front door when the CP comes inside.\"     Pt has paraplegia and has had CP visits in the past. Lab orders & flu vaccine order have been placed (10/13/22).       "

## 2022-11-09 ENCOUNTER — ALLIED HEALTH/NURSE VISIT (OUTPATIENT)
Dept: OTHER | Facility: CLINIC | Age: 57
End: 2022-11-09
Payer: COMMERCIAL

## 2022-11-09 DIAGNOSIS — G82.20 PARAPLEGIA (H): ICD-10-CM

## 2022-11-09 DIAGNOSIS — Z23 ENCOUNTER FOR IMMUNIZATION: ICD-10-CM

## 2022-11-09 PROCEDURE — 99207 PR NO BILLABLE SERVICE THIS VISIT: CPT

## 2022-11-14 DIAGNOSIS — G89.4 CHRONIC PAIN SYNDROME: ICD-10-CM

## 2022-11-14 RX ORDER — OXYCODONE AND ACETAMINOPHEN 5; 325 MG/1; MG/1
1-2 TABLET ORAL EVERY 6 HOURS PRN
Qty: 240 TABLET | Refills: 0 | Status: SHIPPED | OUTPATIENT
Start: 2022-11-14 | End: 2022-12-12

## 2022-11-14 RX ORDER — FENTANYL 25 UG/1
1 PATCH TRANSDERMAL
Qty: 10 PATCH | Refills: 0 | Status: SHIPPED | OUTPATIENT
Start: 2022-11-14 | End: 2022-12-12

## 2022-11-14 NOTE — TELEPHONE ENCOUNTER
Requested Medication:   Pending Prescriptions:                       Disp   Refills    oxyCODONE-acetaminophen (PERCOCET) 5-325 *240 ta*0            Sig: Take 1-2 tablets by mouth every 6 hours as needed           for moderate to severe pain    fentaNYL (DURAGESIC) 25 mcg/hr 72 hr patch10 pat*0            Sig: Place 1 patch onto the skin every 72 hours APPLY           1 PATCH ON THE SKIN EVERY 3RD DAY       Last Refill:  10/17/22    Last Office Visit:  Visit date not found     Next Appointment with Provider:  Visit date not found   Future Appointments 11/14/2022 - 5/13/2023    None          Clinic visit frequency required: Q 3 months    Controlled substance agreement on file: No.    Urine Drug Screen on file:  No

## 2022-11-15 ENCOUNTER — PATIENT OUTREACH (OUTPATIENT)
Dept: CARE COORDINATION | Facility: CLINIC | Age: 57
End: 2022-11-15

## 2022-11-15 DIAGNOSIS — F32.0 CURRENT MILD EPISODE OF MAJOR DEPRESSIVE DISORDER WITHOUT PRIOR EPISODE (H): ICD-10-CM

## 2022-11-15 NOTE — PROGRESS NOTES
Community Paramedic Program  Community Health Worker Outreach    Called patient to reschedule appointment scheduled for Wednesday, November 9th at 4 pm    Visit rescheduled for: Tuesday, November 29th / 3:30-4 pm / CP Isamar (date/time/CP)    Additional information:   Received an update from the CP that pt canceled their visit for a lab draw and flu shot due to her granddaughter being sick with a fever. CP asked me to reach out and reschedule their visit.    Left detailed message for pt regarding potential visit on November 29th at 3:30-4 pm. Asked pt to call me back to confirm visit or let me know if she's not available. Will attempt to reach pt by phone in 3 business days if she doesn't respond before then.    PCP and pt requesting bivalent COVID booster from CP program. Have pt on list for November 30th traveling COVID vaccine & booster clinic and will reach out to pt closer to that date to confirm CP visit time and pt's availability.

## 2022-11-16 RX ORDER — PAROXETINE 20 MG/1
TABLET, FILM COATED ORAL
Qty: 30 TABLET | Refills: 1 | Status: SHIPPED | OUTPATIENT
Start: 2022-11-16 | End: 2023-05-25

## 2022-11-16 NOTE — TELEPHONE ENCOUNTER
"Routing refill request to provider for review/approval because:  PHQ-9 score    Last Written Prescription Date:  10/13/2022  Last Fill Quantity: 30,  # refills: 1   Last office visit provider:  10/13/2022     Requested Prescriptions   Pending Prescriptions Disp Refills     PARoxetine (PAXIL) 20 MG tablet [Pharmacy Med Name: PAROXETINE 20MG TABLETS] 30 tablet 1     Sig: TAKE 1 AND 1/2 TABLET IN MORNING FOR 7 DAYS, THEN 1 TABLET FOR 7 DAYS, THEN 1/2 TABLET FOR 7 DAYS, THEN STOP       SSRIs Protocol Failed - 11/15/2022  8:05 AM        Failed - PHQ-9 score less than 5 in past 6 months     Please review last PHQ-9 score.           Passed - Medication is active on med list        Passed - Patient is age 18 or older        Passed - No active pregnancy on record        Passed - No positive pregnancy test in last 12 months        Passed - Recent (6 mo) or future (30 days) visit within the authorizing provider's specialty     Patient had office visit in the last 6 months or has a visit in the next 30 days with authorizing provider or within the authorizing provider's specialty.  See \"Patient Info\" tab in inbasket, or \"Choose Columns\" in Meds & Orders section of the refill encounter.                 Julia Cunningham RN 11/15/22 9:29 PM  "

## 2022-11-18 NOTE — PROGRESS NOTES
Community Paramedic Program  Community Health Worker Outreach    UTC/Voicemail    Outreach attempted x 2.      Left message on patient's voicemail with call back information and requested return call.    CHW Follow-up Plan: will try to reach patient again in 1-2 business days.    Note: Attempting to reschedule CP visit for lab draw & flu shot on 11/29. Also notified pt that we can include her on our pt list for the 11/30 traveling [place] clinic if she'd like to get her bivalent booster that day from the CP. Asked pt to call me back and left my direct dial information.

## 2022-11-21 NOTE — PROGRESS NOTES
Community Paramedic Program  Canceled visit    Patient/patient's family called to cancel appointment scheduled for Wednesday, November 9th at 4 pm.    Visit rescheduled for: Tuesday, November 29th / 3:30-4 pm (date/time/CP)    Additional information:   Pt left a voicemail message yesterday confirming the rescheduled CP visit for a lab draw and flu shot on November 29th. She also shared that she'll be available on Wednesday, November 30th for a different CP to visit and administer her bivalent COVID booster.    I will reach out to pt by phone in 1-2 business days to confirm her CP visit time on November 30th.     Routing to pt's PCP and the CP scheduled to visit 11/29 for awareness.

## 2022-11-22 ENCOUNTER — PATIENT OUTREACH (OUTPATIENT)
Dept: CARE COORDINATION | Facility: CLINIC | Age: 57
End: 2022-11-22

## 2022-11-22 NOTE — PROGRESS NOTES
Community Paramedic Program   [facility]    Referred for: bivalent booster    Date and type of last vaccine/booster: 1/11/22, first monovalent booster    Signed order for Pfizer vaccine or bivalent booster in chart?: yes    Visit scheduled for Wednesday, November 30th at 9-9:30 am with CP [place]    Additional information:   Left pt a voicemail message to confirm visit time during our November 30th traveling COVID vaccine & booster clinic. Asked pt to call me back to confirm visit time. Will reach out to pt by phone in 1-2 days if no response before then.     Per previous conversation with pt, she is able to unlock her door via her cell phone. Pt has paraplegia and if no other family members are present when CP visits, she has asked that the CP enter and identify themself. Pt also has a cat at home. Will confirm pt's preferences when we connect by phone and will update the CP.        Concetta Maloney  Community Health Worker  Community Paramedic program  [phone]  [email or web address]

## 2022-11-23 NOTE — PROGRESS NOTES
Community Paramedic Program  Community Health Worker Outreach    UTC/Voicemail    Outreach attempted x 2.      Left message on patient's voicemail with call back information and requested return call.    CHW Follow-up Plan: will try to reach patient again in 3-5 business days.    Note: Left another message asking pt to call me back and confirm 11/30 CP visit for her COVID booster. Asked pt to share if a family member will be home to open the door or if pt will open it via her phone. Left my direct dial. Will attempt to reach pt again on 11/28 if no response before then.

## 2022-11-29 ENCOUNTER — PATIENT OUTREACH (OUTPATIENT)
Dept: CARE COORDINATION | Facility: CLINIC | Age: 57
End: 2022-11-29

## 2022-11-29 NOTE — PROGRESS NOTES
Community Paramedic Program  Community Health Worker Outreach    Called patient to reschedule appointment scheduled for today, November 29th at 3:30 pm (for flu shot & lab draw)    Visit rescheduled for: Thursday, December 8th / 3 pm / CP Isamar (date/time/CP)    Additional information:   Spoke with pt and notified her that we need to reschedule the CP visit this afternoon. Pt agreed. Confirmed that pt will fast for 8-10 hours before the CP visit for the lipid profile lab.     Confirmed CP visit tomorrow at 9-9:30 am for pt to receive her bivalent COVID booster at home. She asked if the CP could bring the flu shot and administer at the same time tomorrow, and I agreed. Updated appt notes for tomorrow's visit and shared with pt that I will update the CP today for awareness.     Pt shared that she doesn't think any other family will be at home to open the door tomorrow morning. She asked that the CP knock when she arrives and pt said she'll open the door via her phone. Asked pt to reach out with questions or to reschedule if needed. She agreed and expressed appreciation for the call.     Routing to both CPs for awareness and updated plan.

## 2022-11-30 ENCOUNTER — ALLIED HEALTH/NURSE VISIT (OUTPATIENT)
Dept: OTHER | Facility: CLINIC | Age: 57
End: 2022-11-30
Payer: COMMERCIAL

## 2022-11-30 VITALS
TEMPERATURE: 98 F | SYSTOLIC BLOOD PRESSURE: 118 MMHG | OXYGEN SATURATION: 95 % | HEART RATE: 64 BPM | DIASTOLIC BLOOD PRESSURE: 80 MMHG | RESPIRATION RATE: 16 BRPM

## 2022-11-30 DIAGNOSIS — Z23 NEED FOR PROPHYLACTIC VACCINATION AND INOCULATION AGAINST INFLUENZA: Primary | ICD-10-CM

## 2022-11-30 DIAGNOSIS — Z23 HIGH PRIORITY FOR 2019-NCOV VACCINE: ICD-10-CM

## 2022-11-30 PROCEDURE — 99207 PR COMMUNITY PARAMEDIC - PATIENT NOT BILLABLE: CPT

## 2022-11-30 PROCEDURE — 91312 COVID-19 VACCINE BIVALENT BOOSTER 12+ (PFIZER): CPT

## 2022-11-30 PROCEDURE — 90682 RIV4 VACC RECOMBINANT DNA IM: CPT

## 2022-11-30 PROCEDURE — 0124A COVID-19 VACCINE BIVALENT BOOSTER 12+ (PFIZER): CPT

## 2022-11-30 PROCEDURE — G0008 ADMIN INFLUENZA VIRUS VAC: HCPCS

## 2022-12-04 NOTE — PROGRESS NOTES
Community Paramedic One Time Visit    November 30, 2022; 10:00 AM    Lyn Russ is a 57 year old year old adult being seen at home visit    Present at appointment:  Patient Lacey and Community Paramedic [place]    Vitals:  BP Readings from Last 1 Encounters:   11/30/22 118/80     Pulse Readings from Last 1 Encounters:   11/30/22 64     Wt Readings from Last 1 Encounters:   No data found for Wt     Ht Readings from Last 1 Encounters:   No data found for Ht         Clinical Concerns:  Current Medical Concerns:  Need for (Vaccination; Lab draw/sample)    Education Provided to patient: Monitor for signs and symptoms of allergic reaction to vaccine, call 911 if needed   Flu Shot given: Yes  COVID Vaccine Given: Yes  Lab draw or specimen collection: No      Plan:     Time spent with patient: 45    The patient meets one or more of the following criteria:  * Requires services to prevent readmission to a nursing home or hospital    Acute concern/Follow-up recommendations: One- time visit for vaccinations from Community Paramedic [place].    Issues for Provider to follow up on: Community Paramedic visited patient in home, provided one-time visit.     I used aseptic technique to provide patient with influenza and COVID-19 vaccinations in opposite UE's. Patient denied discomfort during the injections. No redness, swelling, or bleeding noted afterwards.

## 2022-12-07 ENCOUNTER — PATIENT OUTREACH (OUTPATIENT)
Dept: CARE COORDINATION | Facility: CLINIC | Age: 57
End: 2022-12-07

## 2022-12-07 ENCOUNTER — NURSE TRIAGE (OUTPATIENT)
Dept: NURSING | Facility: CLINIC | Age: 57
End: 2022-12-07

## 2022-12-07 NOTE — TELEPHONE ENCOUNTER
Patient calls with concerns regarding covid-19. She tested positive today but hasn't felt well since Friday. Patient did have a covid-19 booster on Wednesday 11/30. Friday she didn't feel well but thought it was from the vaccine, over the weekend symptoms worsened. Patient currently has a cough and nasal congestion. She did have a fever over the weekend of 101-102 but has not noticed one the last couple of days. Patient is coughing up dark yellow phlegm. She denies any chest pain or difficulty breathing. Patient has paraparesis of lower extremities.    Patient is advised on a virtual visit at this time due to being high risk and for possible treatment. Patient is agreeable with plan and is transferred to scheduling.    Gabrielle Silva RN  [facility]        Reason for Disposition    HIGH RISK for severe COVID complications (e.g., weak immune system, age > 64 years, obesity with BMI > 25, pregnant, chronic lung disease or other chronic medical condition) (Exception: Already seen by PCP and no new or worsening symptoms.)    [1] Fever > 100.0 F (37.8 C) AND [2] bedridden (e.g., nursing home patient, CVA, chronic illness, recovering from surgery)    Additional Information    Negative: SEVERE difficulty breathing (e.g., struggling for each breath, speaks in single words)    Negative: Difficult to awaken or acting confused (e.g., disoriented, slurred speech)    Negative: Bluish (or gray) lips or face now    Negative: Shock suspected (e.g., cold/pale/clammy skin, too weak to stand, low BP, rapid pulse)    Negative: Sounds like a life-threatening emergency to the triager    Negative: [1] Diagnosed or suspected COVID-19 AND [2] symptoms lasting 3 or more weeks    Negative: [1] COVID-19 exposure AND [2] no symptoms    Negative: COVID-19 vaccine reaction suspected (e.g., fever, headache, muscle aches) occurring 1 to 3 days after getting vaccine    Negative: COVID-19 vaccine, questions about    Negative:  [1] Lives with someone known to have influenza (flu test positive) AND [2] flu-like symptoms (e.g., cough, runny nose, sore throat, SOB; with or without fever)    Negative: [1] Adult with possible COVID-19 symptoms AND [2] triager concerned about severity of symptoms or other causes    Negative: COVID-19 and breastfeeding, questions about    Negative: SEVERE or constant chest pain or pressure  (Exception: Mild central chest pain, present only when coughing.)    Negative: MODERATE difficulty breathing (e.g., speaks in phrases, SOB even at rest, pulse 100-120)    Negative: Headache and stiff neck (can't touch chin to chest)    Negative: Oxygen level (e.g., pulse oximetry) 90 percent or lower    Negative: Chest pain or pressure    Negative: Patient sounds very sick or weak to the triager    Negative: MILD difficulty breathing (e.g., minimal/no SOB at rest, SOB with walking, pulse <100)    Negative: Fever > 103 F (39.4 C)    Negative: [1] Fever > 101 F (38.3 C) AND [2] over 60 years of age    Protocols used: CORONAVIRUS (COVID-19) DIAGNOSED OR [id number] 1.18.2022

## 2022-12-07 NOTE — PROGRESS NOTES
"Community Paramedic Program  Canceled visit    Patient/patient's family called to cancel appointment scheduled for tomorrow, December 8th at 3 pm    Visit rescheduled for: TBD    Additional information:   Called pt back. I thanked her for reaching out and asked how she's doing. Per pt, she took a rapid at home COVID test after her daughter tested positive late last week. Pt said \"it's been absolutely miserable.\" She reported that her bones feel \"like they're made of glass,\" chest congestion, body aches, a sore throat, no appetite, and that sleeping has been difficult due to \"coughing all night long.\"     Confirmed that I will cancel the CP visit tomorrow afternoon for the lab draw. She asked if testing positive changes anything with the lab draw, and I offered to ask her PCP and make him aware. She agreed and expressed appreciation.    Pt shared she called and spoke with a nurse after leaving me a message this morning. Pt said she has a telephone visit at 5 pm today to discuss her symptoms and potentially being treated with Paxlovid.     Pt also asked if I would update the CP who visited last week to administer the bivalent COVID booster and flu shot. I agreed to do so.    Asked pt to reach out to me in the meantime with questions or updates. She said she would and thanked me for calling her back.     Routing to pt's PCP and the CP for awareness. I will follow up with pt in one week by phone.      Concetta Maloney  Community Health Worker  Community Paramedic program  [phone]  [email or web address]      "

## 2022-12-12 DIAGNOSIS — G89.4 CHRONIC PAIN SYNDROME: ICD-10-CM

## 2022-12-12 RX ORDER — OXYCODONE AND ACETAMINOPHEN 5; 325 MG/1; MG/1
1-2 TABLET ORAL EVERY 6 HOURS PRN
Qty: 240 TABLET | Refills: 0 | Status: SHIPPED | OUTPATIENT
Start: 2022-12-12 | End: 2023-01-11

## 2022-12-12 RX ORDER — FENTANYL 25 UG/1
1 PATCH TRANSDERMAL
Qty: 10 PATCH | Refills: 0 | Status: SHIPPED | OUTPATIENT
Start: 2022-12-12 | End: 2023-01-11

## 2022-12-12 NOTE — TELEPHONE ENCOUNTER
Pending Prescriptions:                       Disp   Refills    fentaNYL (DURAGESIC) 25 mcg/hr 72 hr patch10 pat*0            Sig: Place 1 patch onto the skin every 72 hours APPLY           1 PATCH ON THE SKIN EVERY 3RD DAY    oxyCODONE-acetaminophen (PERCOCET) 5-325 *240 ta*0            Sig: Take 1-2 tablets by mouth every 6 hours as needed           for moderate to severe pain

## 2022-12-16 DIAGNOSIS — Z29.89 NEED FOR PROPHYLAXIS AGAINST URINARY TRACT INFECTION: ICD-10-CM

## 2022-12-16 RX ORDER — NITROFURANTOIN MACROCRYSTALS 50 MG/1
CAPSULE ORAL
Qty: 90 CAPSULE | Refills: 3 | Status: SHIPPED | OUTPATIENT
Start: 2022-12-16 | End: 2023-12-16

## 2022-12-16 NOTE — TELEPHONE ENCOUNTER
Routing refill request to provider for review/approval because:  Drug not on the [facility] refill protocol     Last Written Prescription Date:  12/24/2020  Last Fill Quantity: 90,  # refills: 3   Last office visit provider:  10/13/22     Requested Prescriptions   Pending Prescriptions Disp Refills     nitroFURantoin macrocrystal (MACRODANTIN) 50 MG capsule [Pharmacy Med Name: NITROFURANTOIN MACRO 50MG CAPSULES] 90 capsule 3     Sig: TAKE 1 CAPSULE(50 MG) BY MOUTH DAILY       There is no refill protocol information for this order          Tavia Delgado, RN 12/16/22 12:24 PM

## 2023-01-05 DIAGNOSIS — F32.0 CURRENT MILD EPISODE OF MAJOR DEPRESSIVE DISORDER WITHOUT PRIOR EPISODE (H): ICD-10-CM

## 2023-01-05 NOTE — TELEPHONE ENCOUNTER
"Routing refill request to provider for review/approval because:  Strength requested does not match strength on file    Last office visit provider:  10/13/22     Requested Prescriptions   Pending Prescriptions Disp Refills     PARoxetine (PAXIL) 40 MG tablet [Pharmacy Med Name: PAROXETINE 40MG TABLETS] 90 tablet 3     Sig: TAKE 1 TABLET BY MOUTH EVERY MORNING       SSRIs Protocol Failed - 1/5/2023  8:05 AM        Failed - PHQ-9 score less than 5 in past 6 months     Please review last PHQ-9 score.           Passed - Medication is active on med list        Passed - Patient is age 18 or older        Passed - No active pregnancy on record        Passed - No positive pregnancy test in last 12 months        Passed - Recent (6 mo) or future (30 days) visit within the authorizing provider's specialty     Patient had office visit in the last 6 months or has a visit in the next 30 days with authorizing provider or within the authorizing provider's specialty.  See \"Patient Info\" tab in inbasket, or \"Choose Columns\" in Meds & Orders section of the refill encounter.                 Lenore Walker RN 01/05/23 4:25 PM  "

## 2023-01-06 RX ORDER — PAROXETINE 40 MG/1
TABLET, FILM COATED ORAL
Qty: 90 TABLET | Refills: 3 | Status: SHIPPED | OUTPATIENT
Start: 2023-01-06 | End: 2024-01-23

## 2023-01-11 DIAGNOSIS — G89.4 CHRONIC PAIN SYNDROME: ICD-10-CM

## 2023-01-11 RX ORDER — FENTANYL 25 UG/1
1 PATCH TRANSDERMAL
Qty: 10 PATCH | Refills: 0 | Status: SHIPPED | OUTPATIENT
Start: 2023-01-11 | End: 2023-02-07

## 2023-01-11 RX ORDER — OXYCODONE AND ACETAMINOPHEN 5; 325 MG/1; MG/1
1-2 TABLET ORAL EVERY 6 HOURS PRN
Qty: 240 TABLET | Refills: 0 | Status: SHIPPED | OUTPATIENT
Start: 2023-01-11 | End: 2023-02-07

## 2023-02-04 DIAGNOSIS — E03.9 HYPOTHYROIDISM, UNSPECIFIED TYPE: ICD-10-CM

## 2023-02-05 RX ORDER — LEVOTHYROXINE SODIUM 75 UG/1
TABLET ORAL
Qty: 90 TABLET | Refills: 3 | OUTPATIENT
Start: 2023-02-05

## 2023-02-07 DIAGNOSIS — G89.4 CHRONIC PAIN SYNDROME: ICD-10-CM

## 2023-02-07 RX ORDER — OXYCODONE AND ACETAMINOPHEN 5; 325 MG/1; MG/1
1-2 TABLET ORAL EVERY 6 HOURS PRN
Qty: 240 TABLET | Refills: 0 | Status: SHIPPED | OUTPATIENT
Start: 2023-02-07 | End: 2023-03-03

## 2023-02-07 RX ORDER — FENTANYL 25 UG/1
1 PATCH TRANSDERMAL
Qty: 10 PATCH | Refills: 0 | Status: SHIPPED | OUTPATIENT
Start: 2023-02-07 | End: 2023-03-03

## 2023-02-07 NOTE — TELEPHONE ENCOUNTER
Requested Medication:   Pending Prescriptions:                       Disp   Refills    fentaNYL (DURAGESIC) 25 mcg/hr 72 hr patch10 pat*0            Sig: Place 1 patch onto the skin every 72 hours APPLY           1 PATCH ON THE SKIN EVERY 3RD DAY    oxyCODONE-acetaminophen (PERCOCET) 5-325 *240 ta*0            Sig: Take 1-2 tablets by mouth every 6 hours as needed           for moderate to severe pain       Last Refill:  1/11/2023    Last Office Visit:  Visit date not found     Next Appointment with Provider:  Visit date not found     Clinic visit frequency required: Q 3 months    Controlled substance agreement on file: No.    Urine Drug Screen on file:  No

## 2023-02-09 DIAGNOSIS — R60.9 EDEMA, UNSPECIFIED TYPE: ICD-10-CM

## 2023-02-10 RX ORDER — FUROSEMIDE 20 MG
TABLET ORAL
Qty: 90 TABLET | Refills: 3 | OUTPATIENT
Start: 2023-02-10

## 2023-03-03 DIAGNOSIS — G89.4 CHRONIC PAIN SYNDROME: ICD-10-CM

## 2023-03-03 RX ORDER — FENTANYL 25 UG/1
1 PATCH TRANSDERMAL
Qty: 10 PATCH | Refills: 0 | Status: SHIPPED | OUTPATIENT
Start: 2023-03-03 | End: 2023-03-31

## 2023-03-03 RX ORDER — OXYCODONE AND ACETAMINOPHEN 5; 325 MG/1; MG/1
1-2 TABLET ORAL EVERY 6 HOURS PRN
Qty: 240 TABLET | Refills: 0 | Status: SHIPPED | OUTPATIENT
Start: 2023-03-03 | End: 2023-03-31

## 2023-03-31 DIAGNOSIS — G89.4 CHRONIC PAIN SYNDROME: ICD-10-CM

## 2023-03-31 RX ORDER — OXYCODONE AND ACETAMINOPHEN 5; 325 MG/1; MG/1
1-2 TABLET ORAL EVERY 6 HOURS PRN
Qty: 240 TABLET | Refills: 0 | Status: SHIPPED | OUTPATIENT
Start: 2023-03-31 | End: 2023-04-27

## 2023-03-31 RX ORDER — FENTANYL 25 UG/1
1 PATCH TRANSDERMAL
Qty: 10 PATCH | Refills: 0 | Status: SHIPPED | OUTPATIENT
Start: 2023-03-31 | End: 2023-04-27

## 2023-03-31 NOTE — TELEPHONE ENCOUNTER
Pending Prescriptions:                       Disp   Refills    fentaNYL (DURAGESIC) 25 mcg/hr 72 hr patch10 pat*0            Sig: Place 1 patch onto the skin every 72 hours APPLY           1 PATCH ON THE SKIN EVERY 3RD DAY    oxyCODONE-acetaminophen (PERCOCET) 5-325 *240 ta*0            Sig: Take 1-2 tablets by mouth every 6 hours as needed           for moderate to severe pain    Medication last filled: 3/3/23    Last clinic visit: 10/13/22     Clinic visit frequency required: Q 6  months    Next clinic visit: NA    Controlled substance agreement on file: Yes:  Date 9/24/19.    Urine Drug Screen on file:  Yes    Pt is has about 3 days left.

## 2023-04-18 ENCOUNTER — MYC MEDICAL ADVICE (OUTPATIENT)
Dept: FAMILY MEDICINE | Facility: CLINIC | Age: 58
End: 2023-04-18
Payer: COMMERCIAL

## 2023-04-18 DIAGNOSIS — Z12.11 COLON CANCER SCREENING: Primary | ICD-10-CM

## 2023-04-19 ENCOUNTER — PATIENT OUTREACH (OUTPATIENT)
Dept: CARE COORDINATION | Facility: CLINIC | Age: 58
End: 2023-04-19
Payer: COMMERCIAL

## 2023-04-19 NOTE — PROGRESS NOTES
Community Paramedic Program  Community Health Worker Follow Up    Intervention and Education during outreach:   Received a call from pt requesting a Community Paramedic visit for labs. Pt had a CP visit scheduled in December, but it was canceled when pt tested positive for COVID. She asked for a new referral and shared that she needs to schedule a visit with her PCP and would like to have the labs done prior, if possible. She left her phone number and asked me to call her back. Pt stated that it's okay to leave a message on her phone, as sometimes it's difficult for her to reach it right away.    Routing to pt's PCP to request new Community Paramedic referral, if in agreement. Labs were ordered 10/13/2022; will ask pt's PCP to confirm if he'd like those drawn or would like to order additional labs.     CHW Plan:   1. CP CHW will request new referral and confirm lab orders with pt's PCP.  2. When pt's PCP sends new referral, CP CHW will reach out to pt to schedule home visit.        Concetta Maloney  Community Health Worker  Community Paramedic program  [phone]  [email or web address]

## 2023-04-21 ENCOUNTER — LAB (OUTPATIENT)
Dept: FAMILY MEDICINE | Facility: CLINIC | Age: 58
End: 2023-04-21
Payer: COMMERCIAL

## 2023-04-21 DIAGNOSIS — Z12.11 COLON CANCER SCREENING: ICD-10-CM

## 2023-04-24 DIAGNOSIS — E03.9 HYPOTHYROIDISM, UNSPECIFIED TYPE: Primary | ICD-10-CM

## 2023-04-24 DIAGNOSIS — G89.4 CHRONIC PAIN SYNDROME: ICD-10-CM

## 2023-04-24 DIAGNOSIS — G82.20 PARAPLEGIA (H): ICD-10-CM

## 2023-04-26 ENCOUNTER — PATIENT OUTREACH (OUTPATIENT)
Dept: CARE COORDINATION | Facility: CLINIC | Age: 58
End: 2023-04-26
Payer: COMMERCIAL

## 2023-04-26 NOTE — PROGRESS NOTES
Community Paramedic Program  Community Health Worker Outreach    UTC/Voicemail    Outreach attempted x 1.      Left message on patient's voicemail with call back information and requested return call.    CHW Follow-up Plan: will try to reach patient again in 1-2 business days.    Note: Available with CP3 on 5/1 at 11:30 am or CP2 on 5/2 at 10 am? Sent [employer] msg as well.    Referral source: Pt's PCP  Reason(s) for visit: Labs/Injections (please ensure orders have been placed in Epic)    Goals for visit(s): Other convenience due to paraplegia   How often should patient be seen: Other as needed   Preference on when patient should be seen: Within 2 Weeks      Comment: PCP:  Brayden Mahmood      Other info that would be helpful:  Patient with paraplegia     Lab order(s) placed: yes (10/13/22)    Timed or fasting lab requested: yes, fasting lipid

## 2023-04-27 DIAGNOSIS — G89.4 CHRONIC PAIN SYNDROME: ICD-10-CM

## 2023-04-27 RX ORDER — FENTANYL 25 UG/1
1 PATCH TRANSDERMAL
Qty: 10 PATCH | Refills: 0 | Status: SHIPPED | OUTPATIENT
Start: 2023-04-27 | End: 2023-05-25

## 2023-04-27 RX ORDER — OXYCODONE AND ACETAMINOPHEN 5; 325 MG/1; MG/1
1-2 TABLET ORAL EVERY 6 HOURS PRN
Qty: 240 TABLET | Refills: 0 | Status: SHIPPED | OUTPATIENT
Start: 2023-04-27 | End: 2023-05-25

## 2023-04-27 NOTE — TELEPHONE ENCOUNTER
Medication last filled:03/31/2023    Last clinic visit: 12/07/2022     Clinic visit frequency required: Q 6  months    Next clinic visit: none    Controlled substance agreement on file: Yes:  Date 03/31/2023.    Urine Drug Screen on file:  Yes 03/31/2023

## 2023-04-27 NOTE — PROGRESS NOTES
Community Paramedic Program  Community Health Worker Outreach    UTC/Voicemail    Outreach attempted x 2.      Left message on patient's voicemail with call back information and requested return call.    CHW Follow-up Plan: will try to reach patient again in 1-2 business days.    Note: Available with CP2 on 5/2 or 5/3 at 10 am?      Referral source: Pt's PCP  Reason(s) for visit: Labs/Injections (please ensure orders have been placed in Epic)    Goals for visit(s): Other convenience due to paraplegia   How often should patient be seen: Other as needed   Preference on when patient should be seen: Within 2 Weeks      Comment: PCP:  Brayden Mahmood      Other info that would be helpful:  Patient with paraplegia     Lab order(s) placed: yes (10/13/22)    Timed or fasting lab requested: yes, fasting lipid

## 2023-05-01 NOTE — PROGRESS NOTES
Community Paramedic Program  Community Health Worker Outreach    UTC/Voicemail    Outreach attempted x 3.      Left message on patient's voicemail with call back information and requested return call.    CHW Follow-up Plan: will do no further outreaches at this time.    Note: Sending pt updated MyChart msg with availability. Routing to PCP for awareness.    Referral source: Pt's PCP  Reason(s) for visit: Labs/Injections (please ensure orders have been placed in Epic)    Goals for visit(s): Other convenience due to paraplegia   How often should patient be seen: Other as needed   Preference on when patient should be seen: Within 2 Weeks      Comment: PCP:  Brayden Mahmood      Other info that would be helpful:  Patient with paraplegia     Lab order(s) placed: yes (10/13/22)    Timed or fasting lab requested: yes, fasting lipid

## 2023-05-09 ENCOUNTER — PATIENT OUTREACH (OUTPATIENT)
Dept: CARE COORDINATION | Facility: CLINIC | Age: 58
End: 2023-05-09
Payer: COMMERCIAL

## 2023-05-10 ENCOUNTER — PATIENT OUTREACH (OUTPATIENT)
Dept: OTHER | Facility: CLINIC | Age: 58
End: 2023-05-10
Payer: COMMERCIAL

## 2023-05-10 NOTE — PROGRESS NOTES
Community Paramedic Program  Community Health Worker Outreach    UTC/Voicemail    Outreach attempted x 1.      Left message on patient's voicemail with call back information and requested return call.    CHW Follow-up Plan: will try to reach patient again in 1-2 business days.    Note: Scheduled with CP3 for 5/16 at 10 am. Sent Grow msg asking pt to call or msg me back to confirm. Reminded pt that lab is fasting.     Referral source: Pt's PCP  Reason(s) for visit: Labs/Injections (please ensure orders have been placed in Epic)    Goals for visit(s): Other convenience due to paraplegia   How often should patient be seen: Other as needed   Preference on when patient should be seen: Within 2 Weeks      Lab order(s) placed: yes (10/13/22)    Timed or fasting lab requested: yes, lipid

## 2023-05-11 DIAGNOSIS — E03.9 HYPOTHYROIDISM, UNSPECIFIED TYPE: ICD-10-CM

## 2023-05-11 RX ORDER — LEVOTHYROXINE SODIUM 75 UG/1
TABLET ORAL
Qty: 90 TABLET | Refills: 3 | Status: SHIPPED | OUTPATIENT
Start: 2023-05-11 | End: 2023-05-17

## 2023-05-11 NOTE — TELEPHONE ENCOUNTER
Called in requesting a refill.    Set up for you.    She would like tonight if possible.    Thank you

## 2023-05-11 NOTE — PROGRESS NOTES
5/16 addendum: Pt called back and left vm to confirm visit today for a fasting lab draw at 10 am. Routing to  for awareness.    Community Paramedic Program  Community Health Worker Outreach    UTC/Voicemail    Outreach attempted x 2.      Left message on patient's voicemail with call back information and requested return call.    CHW Follow-up Plan: will try to reach patient again in 1-2 business days.    Note: Scheduled visit for fasting lab draw with 3 on 5/16 at 10 am. Asked pt to call back or respond via [employer] to confirm.     Referral source: Pt's PCP  Reason(s) for visit: Labs/Injections (please ensure orders have been placed in Epic)    Goals for visit(s): Other convenience due to paraplegia   How often should patient be seen: Other as needed   Preference on when patient should be seen: Within 2 Weeks      Lab order(s) placed: yes (10/13/22)    Timed or fasting lab requested: yes, lipid         Poor

## 2023-05-16 ENCOUNTER — ALLIED HEALTH/NURSE VISIT (OUTPATIENT)
Dept: OTHER | Facility: CLINIC | Age: 58
End: 2023-05-16
Payer: COMMERCIAL

## 2023-05-16 ENCOUNTER — LAB (OUTPATIENT)
Dept: LAB | Facility: CLINIC | Age: 58
End: 2023-05-16
Payer: COMMERCIAL

## 2023-05-16 VITALS
DIASTOLIC BLOOD PRESSURE: 72 MMHG | TEMPERATURE: 97.9 F | RESPIRATION RATE: 16 BRPM | SYSTOLIC BLOOD PRESSURE: 110 MMHG | OXYGEN SATURATION: 96 % | HEART RATE: 61 BPM

## 2023-05-16 DIAGNOSIS — G82.20 PARAPLEGIA (H): ICD-10-CM

## 2023-05-16 DIAGNOSIS — E03.9 HYPOTHYROIDISM, UNSPECIFIED TYPE: ICD-10-CM

## 2023-05-16 DIAGNOSIS — G89.4 CHRONIC PAIN SYNDROME: Primary | ICD-10-CM

## 2023-05-16 DIAGNOSIS — E66.3 OVERWEIGHT: ICD-10-CM

## 2023-05-16 DIAGNOSIS — G89.4 CHRONIC PAIN SYNDROME: ICD-10-CM

## 2023-05-16 LAB
ANION GAP SERPL CALCULATED.3IONS-SCNC: 14 MMOL/L (ref 7–15)
BUN SERPL-MCNC: 12.1 MG/DL (ref 6–20)
CALCIUM SERPL-MCNC: 9.4 MG/DL (ref 8.6–10)
CHLORIDE SERPL-SCNC: 104 MMOL/L (ref 98–107)
CHOLEST SERPL-MCNC: 264 MG/DL
CREAT SERPL-MCNC: 0.51 MG/DL (ref 0.51–0.95)
DEPRECATED HCO3 PLAS-SCNC: 22 MMOL/L (ref 22–29)
GFR SERPL CREATININE-BSD FRML MDRD: >90 ML/MIN/1.73M2
GLUCOSE SERPL-MCNC: 91 MG/DL (ref 70–99)
HDLC SERPL-MCNC: 44 MG/DL
LDLC SERPL CALC-MCNC: 171 MG/DL
NONHDLC SERPL-MCNC: 220 MG/DL
POTASSIUM SERPL-SCNC: 3.8 MMOL/L (ref 3.4–5.3)
SODIUM SERPL-SCNC: 140 MMOL/L (ref 136–145)
TRIGL SERPL-MCNC: 245 MG/DL
TSH SERPL DL<=0.005 MIU/L-ACNC: 18 UIU/ML (ref 0.3–4.2)

## 2023-05-16 PROCEDURE — 36415 COLL VENOUS BLD VENIPUNCTURE: CPT

## 2023-05-16 PROCEDURE — 99207 PR COMMUNITY PARAMEDIC - PATIENT NOT BILLABLE: CPT

## 2023-05-16 PROCEDURE — 84443 ASSAY THYROID STIM HORMONE: CPT

## 2023-05-16 PROCEDURE — 80061 LIPID PANEL: CPT

## 2023-05-16 PROCEDURE — 80048 BASIC METABOLIC PNL TOTAL CA: CPT

## 2023-05-16 ASSESSMENT — PAIN SCALES - GENERAL: PAINLEVEL: NO PAIN (0)

## 2023-05-16 NOTE — Clinical Note
Lacey will remain enrolled in the Community Paramedic program until patient and provider needs are met.

## 2023-05-17 DIAGNOSIS — E03.9 HYPOTHYROIDISM, UNSPECIFIED TYPE: ICD-10-CM

## 2023-05-17 RX ORDER — LEVOTHYROXINE SODIUM 100 UG/1
TABLET ORAL
Qty: 30 TABLET | Refills: 3 | Status: SHIPPED | OUTPATIENT
Start: 2023-05-17 | End: 2023-07-26

## 2023-05-17 NOTE — PROGRESS NOTES
Community Paramedic One Time Visit    May 16, 2023; 10:27 AM    Lyn Russ is a 58 year old year old adult being seen at home visit    Present at appointment:  Patient Lacey and Community Paramedic [place]    Vitals:  BP Readings from Last 1 Encounters:   05/16/23 110/72     Pulse Readings from Last 1 Encounters:   05/16/23 61     Wt Readings from Last 1 Encounters:   No data found for Wt     Ht Readings from Last 1 Encounters:   No data found for Ht         Clinical Concerns:  Current Medical Concerns:  Need for lab draw/sample  Flu Shot given: No  COVID Vaccine Given: No  Lab draw or specimen collection: Yes      Plan:     Time spent with patient: 60 minutes    The patient meets one or more of the following criteria:  * Requires services to prevent readmission to a nursing home or hospital    Acute concern/Follow-up recommendations: I met with Lacey at her home. She was lying in bed during the appt.     I obtained lab samples per orders. I used aseptic technique and a straight stick needle in the right AC with success on the first attempt. Lacey denied discomfort during the procedure. There was no redness, swelling, or bleeding noted afterward.     I brought the lab to a [facility] clinic immediately following the appt.     Issues for Provider to follow up on: Lab values.     Lacey would like the newest Covid vaccine. I advised her the CP program will be able to assist her at her home with a vaccination as soon as the next mobile clinic is scheduled, likely in June. The CP team will reach out to PCP for orders.

## 2023-05-24 ASSESSMENT — PATIENT HEALTH QUESTIONNAIRE - PHQ9
10. IF YOU CHECKED OFF ANY PROBLEMS, HOW DIFFICULT HAVE THESE PROBLEMS MADE IT FOR YOU TO DO YOUR WORK, TAKE CARE OF THINGS AT HOME, OR GET ALONG WITH OTHER PEOPLE: SOMEWHAT DIFFICULT
SUM OF ALL RESPONSES TO PHQ QUESTIONS 1-9: 3
SUM OF ALL RESPONSES TO PHQ QUESTIONS 1-9: 3

## 2023-05-25 ENCOUNTER — VIRTUAL VISIT (OUTPATIENT)
Dept: FAMILY MEDICINE | Facility: CLINIC | Age: 58
End: 2023-05-25
Payer: COMMERCIAL

## 2023-05-25 DIAGNOSIS — E55.9 VITAMIN D INSUFFICIENCY: ICD-10-CM

## 2023-05-25 DIAGNOSIS — E03.9 HYPOTHYROIDISM, UNSPECIFIED TYPE: ICD-10-CM

## 2023-05-25 DIAGNOSIS — F11.10 OPIOID ABUSE, CONTINUOUS (H): ICD-10-CM

## 2023-05-25 DIAGNOSIS — N92.1 METRORRHAGIA: ICD-10-CM

## 2023-05-25 DIAGNOSIS — E78.00 HYPERCHOLESTEROLEMIA: ICD-10-CM

## 2023-05-25 DIAGNOSIS — G89.4 CHRONIC PAIN SYNDROME: Primary | ICD-10-CM

## 2023-05-25 DIAGNOSIS — F32.0 CURRENT MILD EPISODE OF MAJOR DEPRESSIVE DISORDER WITHOUT PRIOR EPISODE (H): ICD-10-CM

## 2023-05-25 DIAGNOSIS — G82.20 PARAPLEGIA (H): ICD-10-CM

## 2023-05-25 PROCEDURE — 99442 PR PHYSICIAN TELEPHONE EVALUATION 11-20 MIN: CPT | Mod: 95 | Performed by: FAMILY MEDICINE

## 2023-05-25 RX ORDER — FENTANYL 25 UG/1
1 PATCH TRANSDERMAL
Qty: 10 PATCH | Refills: 0 | Status: SHIPPED | OUTPATIENT
Start: 2023-05-25 | End: 2023-06-22

## 2023-05-25 RX ORDER — OXYCODONE AND ACETAMINOPHEN 5; 325 MG/1; MG/1
1-2 TABLET ORAL EVERY 6 HOURS PRN
Qty: 240 TABLET | Refills: 0 | Status: SHIPPED | OUTPATIENT
Start: 2023-05-25 | End: 2023-06-22

## 2023-05-25 ASSESSMENT — PATIENT HEALTH QUESTIONNAIRE - PHQ9
SUM OF ALL RESPONSES TO PHQ QUESTIONS 1-9: 3
10. IF YOU CHECKED OFF ANY PROBLEMS, HOW DIFFICULT HAVE THESE PROBLEMS MADE IT FOR YOU TO DO YOUR WORK, TAKE CARE OF THINGS AT HOME, OR GET ALONG WITH OTHER PEOPLE: SOMEWHAT DIFFICULT

## 2023-05-25 NOTE — PROGRESS NOTES
Lacey is a 58 year old who is being evaluated via a billable telephone visit.      What phone number would you like to be contacted at? [phone]  How would you like to obtain your AVS? Kassandra    Distant Location (provider location):  On-site    Chronic pain syndrome  Chronic pain syndrome reviewed.  PDMP website appropriate.  Oxycodone acetaminophen 5/325 using 2 tablets 4 times daily chronic with no side effects described.  Fentanyl 25 mcg every 72 hours.  Refills provided.  - oxyCODONE-acetaminophen (PERCOCET) 5-325 MG tablet  Dispense: 240 tablet; Refill: 0  - fentaNYL (DURAGESIC) 25 mcg/hr 72 hr patch  Dispense: 10 patch; Refill: 0    Paraplegia (H)  History of bilateral lower extremity paraplegia    Hypothyroidism, unspecified type  Recent TSH level elevated 18 and did increase levothyroxine from 75 mcg up to 100 mcg daily.  Anticipate reassessment of TSH in next 4 to 6 weeks to ensure desired improvement.  - TSH    Current mild episode of major depressive disorder without prior episode (H)  Depression symptoms stable with paroxetine 40 mg daily.    Opioid abuse, continuous (H)  As above.    Vitamin D insufficiency  Patient will follow-up for vitamin D level to ensure adequate vitamin D replacement.  - Vitamin D Deficiency    Metrorrhagia  Check FSH regarding concerns with irregular menstrual cycles.  - Follicle stimulating hormone    Hypercholesterolemia  Hypercholesterolemia is well.  May use omega-3 fatty acid 1 g daily otherwise therapeutic lifestyle changes reviewed with reassessment of cholesterol panel this fall to ensure desired improvement       Subjective   Lacey is a 58 year old, presenting for the following health issues:  Recheck Medication (Pt is not fsting) and discuss thyroid results         View : No data to display.              History of Present Illness       Hypothyroidism:     Since last visit, patient describes the following symptoms::  Constipation, Dry skin, Fatigue, Hair loss and  Weight gain    Weight gain::  16-20 lbs.    Reason for visit:  Medication update    She eats 2-3 servings of fruits and vegetables daily.She consumes 2 sweetened beverage(s) daily.She exercises with enough effort to increase her heart rate 9 or less minutes per day.  She exercises with enough effort to increase her heart rate 3 or less days per week.   She is taking medications regularly.    Today's PHQ-9         PHQ-9 Total Score: 3    PHQ-9 Q9 Thoughts of better off dead/self-harm past 2 weeks :   Not at all    How difficult have these problems made it for you to do your work, take care of things at home, or get along with other people: Somewhat difficult               Review of Systems   Constitutional, HEENT, cardiovascular, pulmonary, GI, , musculoskeletal, neuro, skin, endocrine and psych systems are negative, except as otherwise noted.      Objective           Vitals:  No vitals were obtained today due to virtual visit.    Physical Exam   healthy, alert and no distress  PSYCH: Alert and oriented times 3; coherent speech, normal   rate and volume, able to articulate logical thoughts, able   to abstract reason, no tangential thoughts, no hallucinations   or delusions  Her affect is normal  RESP: No cough, no audible wheezing, able to talk in full sentences  Remainder of exam unable to be completed due to telephone visits                Phone call duration: 15 minutes

## 2023-06-09 LAB — NONINV COLON CA DNA+OCC BLD SCRN STL QL: NEGATIVE

## 2023-06-11 ENCOUNTER — PATIENT OUTREACH (OUTPATIENT)
Dept: OTHER | Facility: CLINIC | Age: 58
End: 2023-06-11
Payer: COMMERCIAL

## 2023-06-13 ENCOUNTER — PATIENT OUTREACH (OUTPATIENT)
Dept: CARE COORDINATION | Facility: CLINIC | Age: 58
End: 2023-06-13
Payer: COMMERCIAL

## 2023-06-13 NOTE — PROGRESS NOTES
Community Paramedic Program  Community Health Worker Outreach    UTC/Voicemail    Outreach attempted x 1.      Left message on patient's voicemail with call back information and requested return call.    CHW Follow-up Plan: will try to reach patient again in 1-2 business days.    Note: Available with CP3 & CP5 on 6/20 at 2-2:30 pm?     Referred for: additional bivalent dose    Date and type of last vaccine/booster: 11/30/22, bivalent booster    Signed order for Pfizer additional bivalent dose in chart?: no, routing to pt's PCP to request signed order for bivalent Pfizer dose & Community Paramedic referral please      Concetta Maloney  Community Health Worker  Community Paramedic program  [phone]  [email or web address]

## 2023-06-14 NOTE — PROGRESS NOTES
Community Paramedic Program  Community Health Worker Outreach    UTC/Voicemail    Outreach attempted x 1.      Left message on patient's voicemail with call back information and requested return call.    CHW Follow-up Plan: will do no further outreaches at this time.    Note: Messaged with pt's PCP and relayed that due to her age and not being considered immunosuppressed, she is not eligible for the additional bivalent COVID dose at this time. Removed pt from our list. Also updated pt that her PCP would like labs rechecked in mid-July. Notified pt that I will contact her the week of July 10th to get a visit scheduled. Will request CP referral and lab orders from PCP at that time. Left my direct dial and asked pt to call me back with questions or concerns.

## 2023-06-22 DIAGNOSIS — G89.4 CHRONIC PAIN SYNDROME: ICD-10-CM

## 2023-06-22 RX ORDER — OXYCODONE AND ACETAMINOPHEN 5; 325 MG/1; MG/1
1-2 TABLET ORAL EVERY 6 HOURS PRN
Qty: 240 TABLET | Refills: 0 | Status: SHIPPED | OUTPATIENT
Start: 2023-06-22 | End: 2023-07-19

## 2023-06-22 RX ORDER — FENTANYL 25 UG/1
1 PATCH TRANSDERMAL
Qty: 10 PATCH | Refills: 0 | Status: SHIPPED | OUTPATIENT
Start: 2023-06-22 | End: 2023-07-19

## 2023-06-22 NOTE — TELEPHONE ENCOUNTER
Pending Prescriptions:                       Disp   Refills    oxyCODONE-acetaminophen (PERCOCET) 5-325 *240 ta*0            Sig: Take 1-2 tablets by mouth every 6 hours as needed           for moderate to severe pain    fentaNYL (DURAGESIC) 25 mcg/hr 72 hr patch10 pat*0            Sig: Place 1 patch onto the skin every 72 hours APPLY           1 PATCH ON THE SKIN EVERY 3RD DAY

## 2023-07-10 ENCOUNTER — PATIENT OUTREACH (OUTPATIENT)
Dept: CARE COORDINATION | Facility: CLINIC | Age: 58
End: 2023-07-10
Payer: COMMERCIAL

## 2023-07-10 DIAGNOSIS — E03.9 HYPOTHYROIDISM, UNSPECIFIED TYPE: ICD-10-CM

## 2023-07-10 DIAGNOSIS — G82.20 PARAPLEGIA (H): Primary | ICD-10-CM

## 2023-07-10 NOTE — PROGRESS NOTES
Community Paramedic Program    CHW Community/Care Team Outreach    Pt due for lab recheck in mid-July, per her PCP. Routing to pt's PCP to request new referral and any instructions on how to prepare for the draw, if needed.    Lab orders have been placed (5/25/23). Will reach out to pt to offer to schedule a CP visit when new referral received.

## 2023-07-11 ENCOUNTER — PATIENT OUTREACH (OUTPATIENT)
Dept: CARE COORDINATION | Facility: CLINIC | Age: 58
End: 2023-07-11
Payer: COMMERCIAL

## 2023-07-11 NOTE — PROGRESS NOTES
Community Paramedic Program  Community Health Worker Outreach    UTC/Voicemail    Outreach attempted x 1.      Left message on patient's voicemail with call back information and requested return call.    CHW Follow-up Plan: will try to reach patient again in 1-2 business days.    Note: Available with CP2 on 7/20 at 12 pm? Also sending pt a Div[employer] msg to see if we can connect.    Referral source: Pt's PCP  Reason(s) for visit: Labs/Injections (please ensure orders have been placed in Epic)    Goals for visit(s): Medication Compliance    How often should patient be seen: Other as needed for lab draws, etc.   Preference on when patient should be seen: Within 2 Weeks      Comment: PCP:  Brayden Mahmood      Other info that would be helpful:  Needed repeat labs including TSH, vitamin D and FSH redrawn around middle of July, 2023.  Thank you.     Lab order(s) placed: yes (5/25/23)    Timed or fasting lab requested: no

## 2023-07-13 NOTE — PROGRESS NOTES
Community Paramedic Program  Community Health Worker Outreach    UTC/Voicemail    Outreach attempted x 2.      Left message on patient's voicemail with call back information and requested return call.    CHW Follow-up Plan: will try to reach patient again in 1-2 business days.    Note: Available with CP2 on 7/20 at 12 pm?    Referral source: Pt's PCP  Reason(s) for visit: Labs/Injections (please ensure orders have been placed in Epic)    Goals for visit(s): Medication Compliance    How often should patient be seen: Other as needed for lab draws, etc.   Preference on when patient should be seen: Within 2 Weeks      Comment: PCP:  Brayden Mahmood      Other info that would be helpful:  Needed repeat labs including TSH, vitamin D and FSH redrawn around middle of July, 2023.  Thank you.

## 2023-07-17 NOTE — PROGRESS NOTES
7/17 addendum: Received call back from pt confirming 7/25 visit at 10 am for lab draw. Routing to pt's PCP and CP for awareness.    Community Paramedic Program  Community Health Worker Outreach    UTC/Voicemail    Outreach attempted x 1.      Left message on patient's voicemail with call back information and requested return call.    CHW Follow-up Plan: will try to reach patient again in 1-2 business days.    Note: Available 7/25 at 10 am with CP3? Pt left vm on Friday requesting visit with CP3 for lab draw, based on previous experience.     Referral source: Pt's PCP  Reason(s) for visit: Labs/Injections (please ensure orders have been placed in Epic)    Goals for visit(s): Medication Compliance    How often should patient be seen: Other as needed for lab draws, etc.   Preference on when patient should be seen: Within 2 Weeks      Comment: PCP:  Brayden Mahmood      Other info that would be helpful:  Needed repeat labs including TSH, vitamin D and FSH redrawn around middle of July, 2023.  Thank you.     Lab order(s) placed: yes (5/25/23)    Timed or fasting lab requested: no

## 2023-07-19 DIAGNOSIS — G89.4 CHRONIC PAIN SYNDROME: ICD-10-CM

## 2023-07-19 RX ORDER — FENTANYL 25 UG/1
1 PATCH TRANSDERMAL
Qty: 10 PATCH | Refills: 0 | Status: SHIPPED | OUTPATIENT
Start: 2023-07-19 | End: 2023-08-15

## 2023-07-19 RX ORDER — OXYCODONE AND ACETAMINOPHEN 5; 325 MG/1; MG/1
1-2 TABLET ORAL EVERY 6 HOURS PRN
Qty: 240 TABLET | Refills: 0 | Status: SHIPPED | OUTPATIENT
Start: 2023-07-19 | End: 2023-08-15

## 2023-07-25 ENCOUNTER — LAB (OUTPATIENT)
Dept: LAB | Facility: CLINIC | Age: 58
End: 2023-07-25
Payer: COMMERCIAL

## 2023-07-25 ENCOUNTER — ALLIED HEALTH/NURSE VISIT (OUTPATIENT)
Dept: OTHER | Facility: CLINIC | Age: 58
End: 2023-07-25
Payer: COMMERCIAL

## 2023-07-25 ENCOUNTER — TELEPHONE (OUTPATIENT)
Dept: OTHER | Facility: CLINIC | Age: 58
End: 2023-07-25

## 2023-07-25 VITALS
HEART RATE: 70 BPM | RESPIRATION RATE: 16 BRPM | SYSTOLIC BLOOD PRESSURE: 130 MMHG | TEMPERATURE: 97.9 F | OXYGEN SATURATION: 98 % | DIASTOLIC BLOOD PRESSURE: 92 MMHG

## 2023-07-25 DIAGNOSIS — E55.9 VITAMIN D INSUFFICIENCY: ICD-10-CM

## 2023-07-25 DIAGNOSIS — G82.20 PARAPLEGIA (H): ICD-10-CM

## 2023-07-25 DIAGNOSIS — N92.1 METRORRHAGIA: ICD-10-CM

## 2023-07-25 DIAGNOSIS — E03.9 HYPOTHYROIDISM, UNSPECIFIED TYPE: ICD-10-CM

## 2023-07-25 LAB
FSH SERPL IRP2-ACNC: 137 MIU/ML
TSH SERPL DL<=0.005 MIU/L-ACNC: 6.26 UIU/ML (ref 0.3–4.2)

## 2023-07-25 PROCEDURE — 84443 ASSAY THYROID STIM HORMONE: CPT

## 2023-07-25 PROCEDURE — 82306 VITAMIN D 25 HYDROXY: CPT

## 2023-07-25 PROCEDURE — 83001 ASSAY OF GONADOTROPIN (FSH): CPT

## 2023-07-25 PROCEDURE — 36415 COLL VENOUS BLD VENIPUNCTURE: CPT

## 2023-07-25 PROCEDURE — 99207 PR COMMUNITY PARAMEDIC - PATIENT NOT BILLABLE: CPT

## 2023-07-25 NOTE — PROGRESS NOTES
Community Paramedic One Time Visit    July 25, 2023; 10:10 AM    Lyn Russ is a 58 year old year old adult being seen at home visit    Present at appointment:  Patient Lacey and Community Paramedic [place]    Vitals:  BP Readings from Last 1 Encounters:   07/25/23 (!) 130/92     Pulse Readings from Last 1 Encounters:   07/25/23 70     Wt Readings from Last 1 Encounters:   No data found for Wt     Ht Readings from Last 1 Encounters:   No data found for Ht         Clinical Concerns:  Current Medical Concerns:  Need for Lab draw/sample  Education Provided to patient: Monitor for signs and symptoms of allergic reaction to vaccine, call 911 if needed   Flu Shot given: No  COVID Vaccine Given: No  Lab draw or specimen collection: Yes      Plan:   Graduate patient from the Community Paramedic program if no other needs by PCP.     Time spent with patient: 60 minutes    The patient meets one or more of the following criteria:  * Requires services to prevent readmission to a nursing home or hospital    Acute concern/Follow-up recommendations:   I met with Lacey in her home. She was lying in bed and expressed appreciation for the in home lab appt.     I used aseptic technique and a straight stick needle in the pts right AC with success on the first attempt to draw labs per orders. She denied discomfort during the or after the procedure. There was no swelling, redness, or bleeding noted after the lab draw.   I brought the lab samples to the [facility] clinic immediately following the appt.     Issues for Provider to follow up on: Lab values per orders. If no other needs, pt can be graduated from the Community Paramedic program. A new referral can be ordered in the future should the need arise.     Pt had no questions for PCP today.

## 2023-07-26 ENCOUNTER — MYC MEDICAL ADVICE (OUTPATIENT)
Dept: FAMILY MEDICINE | Facility: CLINIC | Age: 58
End: 2023-07-26
Payer: COMMERCIAL

## 2023-07-26 DIAGNOSIS — E03.9 HYPOTHYROIDISM, UNSPECIFIED TYPE: ICD-10-CM

## 2023-07-26 LAB — DEPRECATED CALCIDIOL+CALCIFEROL SERPL-MC: 44 UG/L (ref 20–75)

## 2023-07-26 RX ORDER — LEVOTHYROXINE SODIUM 112 UG/1
TABLET ORAL
Qty: 90 TABLET | Refills: 3 | Status: SHIPPED | OUTPATIENT
Start: 2023-07-26 | End: 2023-09-21

## 2023-07-26 NOTE — TELEPHONE ENCOUNTER
Dr. Mahmood,    Please see MyChart message from patient needing provider's direction.    Please respond directly to patient if appropriate.    Radha Samuels RN, BSN  [facility]

## 2023-07-31 ENCOUNTER — PATIENT OUTREACH (OUTPATIENT)
Dept: CARE COORDINATION | Facility: CLINIC | Age: 58
End: 2023-07-31
Payer: COMMERCIAL

## 2023-07-31 NOTE — TELEPHONE ENCOUNTER
I am graduating Lacey from the Community Paramedic program. CP provided a one time lab appt at [facility] home and there are no other needs at this time. A new referral can be ordered in the future should the need arise.

## 2023-08-05 ENCOUNTER — HEALTH MAINTENANCE LETTER (OUTPATIENT)
Age: 58
End: 2023-08-05

## 2023-08-15 DIAGNOSIS — G89.4 CHRONIC PAIN SYNDROME: ICD-10-CM

## 2023-08-15 RX ORDER — FENTANYL 25 UG/1
1 PATCH TRANSDERMAL
Qty: 10 PATCH | Refills: 0 | Status: SHIPPED | OUTPATIENT
Start: 2023-08-15 | End: 2023-09-11

## 2023-08-15 RX ORDER — OXYCODONE AND ACETAMINOPHEN 5; 325 MG/1; MG/1
1-2 TABLET ORAL EVERY 6 HOURS PRN
Qty: 240 TABLET | Refills: 0 | Status: SHIPPED | OUTPATIENT
Start: 2023-08-15 | End: 2023-09-11

## 2023-08-24 ENCOUNTER — VIRTUAL VISIT (OUTPATIENT)
Dept: FAMILY MEDICINE | Facility: CLINIC | Age: 58
End: 2023-08-24
Payer: COMMERCIAL

## 2023-08-24 DIAGNOSIS — F41.9 ANXIETY: ICD-10-CM

## 2023-08-24 DIAGNOSIS — G47.00 INSOMNIA, UNSPECIFIED TYPE: ICD-10-CM

## 2023-08-24 DIAGNOSIS — E03.9 HYPOTHYROIDISM, UNSPECIFIED TYPE: ICD-10-CM

## 2023-08-24 DIAGNOSIS — G82.20 PARAPLEGIA (H): ICD-10-CM

## 2023-08-24 DIAGNOSIS — Z78.0 POSTMENOPAUSAL STATUS: ICD-10-CM

## 2023-08-24 DIAGNOSIS — R63.5 ABNORMAL WEIGHT GAIN: Primary | ICD-10-CM

## 2023-08-24 PROCEDURE — 99214 OFFICE O/P EST MOD 30 MIN: CPT | Mod: VID | Performed by: FAMILY MEDICINE

## 2023-08-24 RX ORDER — TRAZODONE HYDROCHLORIDE 100 MG/1
50-100 TABLET ORAL AT BEDTIME
Qty: 30 TABLET | Refills: 3 | Status: SHIPPED | OUTPATIENT
Start: 2023-08-24
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
## 2023-08-28 ENCOUNTER — PATIENT OUTREACH (OUTPATIENT)
Dept: CARE COORDINATION | Facility: CLINIC | Age: 58
End: 2023-08-28
Payer: COMMERCIAL

## 2023-08-28 NOTE — PROGRESS NOTES
8/30 addendum: Received Mapbox response from pt. She said she's open to a visit the week of September 11th or 18th at any time. She asked me to call or message her back via Mapbox with the visit date and time. Scheduled CP visit for Tuesday, September 19th at 10 am at pt's home in [place]. Sent pt a Mapbox message with an update. Asked her to call or message me if she has questions or needs to reschedule for any reason.    Routing to pt's PCP and CP for awareness.    Community Paramedic Program  Community Health Worker Outreach    UTC/Voicemail    Outreach attempted x 1.      Left message on patient's voicemail with call back information and requested return call.    CHW Follow-up Plan: will try to reach patient again in one week.    Note: Left detailed msg offering CP visit for lab draw week of September 11th or 18th, per PCP's note. Let pt know I will send Mapbox msg and asked her to call or message me back. Offered to schedule with CP3, who has visited pt before.    Referral source: Pt's PCP  Reason(s) for visit: Labs/Injections (please ensure orders have been placed in Epic)    Goals for visit(s): Other lab test   How often should patient be seen: Other once in mid to late September, 2023 ideally   Preference on when patient should be seen: Within 1 Month      Lab order(s) placed: yes (8/24/23)    Timed or fasting lab requested: no

## 2023-09-11 DIAGNOSIS — G89.4 CHRONIC PAIN SYNDROME: ICD-10-CM

## 2023-09-11 RX ORDER — FENTANYL 25 UG/1
1 PATCH TRANSDERMAL
Qty: 10 PATCH | Refills: 0 | Status: SHIPPED | OUTPATIENT
Start: 2023-09-11 | End: 2023-10-09

## 2023-09-11 RX ORDER — OXYCODONE AND ACETAMINOPHEN 5; 325 MG/1; MG/1
1-2 TABLET ORAL EVERY 6 HOURS PRN
Qty: 240 TABLET | Refills: 0 | Status: SHIPPED | OUTPATIENT
Start: 2023-09-11 | End: 2023-10-09

## 2023-09-11 NOTE — TELEPHONE ENCOUNTER
Refill Request  Medication name: Pending Prescriptions:                       Disp   Refills    fentaNYL (DURAGESIC) 25 mcg/hr 72 hr patch10 pat*0            Sig: Place 1 patch onto the skin every 72 hours APPLY           1 PATCH ON THE SKIN EVERY 3RD DAY    oxyCODONE-acetaminophen (PERCOCET) 5-325 *240 ta*0            Sig: Take 1-2 tablets by mouth every 6 hours as needed           for moderate to severe pain    Requested Pharmacy:  [id number] [place], [place] - [address] AT [facility] [address] [facility]

## 2023-09-13 ENCOUNTER — PATIENT OUTREACH (OUTPATIENT)
Dept: CARE COORDINATION | Facility: CLINIC | Age: 58
End: 2023-09-13
Payer: COMMERCIAL

## 2023-09-13 DIAGNOSIS — Z23 ENCOUNTER FOR IMMUNIZATION: Primary | ICD-10-CM

## 2023-09-13 NOTE — PROGRESS NOTES
Community Paramedic Program  Flu shot clinic    Flu vaccine order in pt's chart: no    Community Paramedic referral needed: no    Date and type of last flu vaccination: 11/30/2022, Flublok    Routing to pt's PCP to request order.     Left pt a message notifying her that I will request a flu vaccine order from her PCP and will ask the CP to administer during their upcoming visit on Tuesday, September 19th at 10 am. Pt also asked about the new COVID vaccine and said she'd like to get it when it's available. Added pt to our list and let her know that we don't have access to it yet. Will reach out to pt when it is available to offer a visit and request an order from her PCP.       Concetta Maloney  Community Health Worker  Community Paramedic program  [phone]  [email or web address]

## 2023-09-19 ENCOUNTER — LAB (OUTPATIENT)
Dept: LAB | Facility: CLINIC | Age: 58
End: 2023-09-19
Payer: COMMERCIAL

## 2023-09-19 ENCOUNTER — ALLIED HEALTH/NURSE VISIT (OUTPATIENT)
Dept: OTHER | Facility: CLINIC | Age: 58
End: 2023-09-19
Payer: COMMERCIAL

## 2023-09-19 VITALS
DIASTOLIC BLOOD PRESSURE: 72 MMHG | OXYGEN SATURATION: 97 % | HEART RATE: 66 BPM | RESPIRATION RATE: 16 BRPM | TEMPERATURE: 97.8 F | SYSTOLIC BLOOD PRESSURE: 123 MMHG

## 2023-09-19 DIAGNOSIS — G82.20 PARAPLEGIA (H): Primary | ICD-10-CM

## 2023-09-19 DIAGNOSIS — E03.9 HYPOTHYROIDISM, UNSPECIFIED TYPE: ICD-10-CM

## 2023-09-19 PROCEDURE — 84443 ASSAY THYROID STIM HORMONE: CPT

## 2023-09-19 PROCEDURE — 36415 COLL VENOUS BLD VENIPUNCTURE: CPT

## 2023-09-19 PROCEDURE — 99207 PR COMMUNITY PARAMEDIC - PATIENT NOT BILLABLE: CPT

## 2023-09-20 ENCOUNTER — MYC MEDICAL ADVICE (OUTPATIENT)
Dept: FAMILY MEDICINE | Facility: CLINIC | Age: 58
End: 2023-09-20
Payer: COMMERCIAL

## 2023-09-20 DIAGNOSIS — E03.9 HYPOTHYROIDISM, UNSPECIFIED TYPE: ICD-10-CM

## 2023-09-20 LAB — TSH SERPL DL<=0.005 MIU/L-ACNC: 4.75 UIU/ML (ref 0.3–4.2)

## 2023-09-20 NOTE — PROGRESS NOTES
Community Paramedic One Time Visit    September 19, 2023; :1000 AM    Lyn Russ is a 58 year old year old adult being seen at home visit    Present at appointment:  Patient Lacey and Community Paramedic [place]    Vitals:  BP Readings from Last 1 Encounters:   09/19/23 123/72     Pulse Readings from Last 1 Encounters:   09/19/23 66     Wt Readings from Last 1 Encounters:   No data found for Wt     Ht Readings from Last 1 Encounters:   No data found for Ht         Clinical Concerns:  Current Medical Concerns:  Need for lab draw/sample    Flu Shot given: No  COVID Vaccine Given: No  Lab draw or specimen collection: Yes      Plan:   Return on 10/3/23 to administer influenza vaccine at the request of patient.     Time spent with patient: 60    The patient meets one or more of the following criteria:  * Requires services to prevent readmission to a nursing home or hospital    Acute concern/Follow-up recommendations: I met with Lacey at her home to provide a lab draw per orders. I used aseptic technique with a straight stick needle in the R AC. Lacey denied discomfort during or after. There was no redness, swelling or bleeding noted after the procedure.     I brought the lab sample to the [facility] [place] following the appt.     Issues for Provider to follow up on: Lacey wanted to wait a couple weeks for her influenza vaccine. She would also like the new Covid vaccine. I will be able to provide both vaccines for Lacey at an upcoming appt. Lacey is aware the new Covid vaccine is not yet available and may need to wait. Please place the order for the Vaccine when it becomes available and I will administer at a future appt.

## 2023-09-21 DIAGNOSIS — E03.9 HYPOTHYROIDISM, UNSPECIFIED TYPE: ICD-10-CM

## 2023-09-21 RX ORDER — LEVOTHYROXINE SODIUM 125 UG/1
TABLET ORAL
Qty: 30 TABLET | Refills: 4 | Status: SHIPPED | OUTPATIENT
Start: 2023-09-21 | End: 2024-02-22

## 2023-09-21 RX ORDER — LEVOTHYROXINE SODIUM 125 UG/1
TABLET ORAL
Qty: 30 TABLET | Refills: 4 | Status: SHIPPED | OUTPATIENT
Start: 2023-09-21 | End: 2023-09-21

## 2023-10-03 ENCOUNTER — PATIENT OUTREACH (OUTPATIENT)
Dept: CARE COORDINATION | Facility: CLINIC | Age: 58
End: 2023-10-03

## 2023-10-03 DIAGNOSIS — Z23 ENCOUNTER FOR IMMUNIZATION: Primary | ICD-10-CM

## 2023-10-09 DIAGNOSIS — G89.4 CHRONIC PAIN SYNDROME: ICD-10-CM

## 2023-10-09 RX ORDER — OXYCODONE AND ACETAMINOPHEN 5; 325 MG/1; MG/1
1-2 TABLET ORAL EVERY 6 HOURS PRN
Qty: 240 TABLET | Refills: 0 | Status: SHIPPED | OUTPATIENT
Start: 2023-10-09 | End: 2023-11-06

## 2023-10-09 RX ORDER — FENTANYL 25 UG/1
1 PATCH TRANSDERMAL
Qty: 10 PATCH | Refills: 0 | Status: SHIPPED | OUTPATIENT
Start: 2023-10-09 | End: 2023-11-06

## 2023-10-10 ENCOUNTER — PATIENT OUTREACH (OUTPATIENT)
Dept: CARE COORDINATION | Facility: CLINIC | Age: 58
End: 2023-10-10
Payer: COMMERCIAL

## 2023-10-10 NOTE — PROGRESS NOTES
Community Paramedic Program  Community Health Worker Outreach    UT/Voicemail    Outreach attempted x 1.      Left message on patient's voicemail with call back information and requested return call.    CHW Follow-up Plan: will try to reach patient again in 3-5 business days.    Note: Left message asking pt if she's available to reschedule her 10/3 CP visit for 10/17 at 1:30-2 pm. Pt was not feeling well & canceled last visit. PCP ordered COVID and flu vaccinations. Asked pt to call me back to confirm.     Routing to the CP for awareness.    Conectta Maloney  Community Health Worker  Community Paramedic program  [phone]  [email or web address]

## 2023-10-11 NOTE — PROGRESS NOTES
Community Paramedic Program   COVID Vaccine Clinic    Referred for: Pfizer vaccine (2023-24)    Date and type of last vaccine/booster: 11/30/22, bivalent booster    Signed order for Pfizer dose in chart?: yes (10/3/23)    Visit scheduled for Tuesday, October 17th at 1:30 pm with CP Africa    Additional information:   Pt returned my call and left a message confirming the visit date and time so she can receive her flu and COVID vaccinations. I called pt and left a message thanking her for the response. Confirmed that I scheduled the visit and that the CP will bring both the Pfizer vaccine and Flublok vaccine to administer to pt. Asked her to call me back with questions, concerns or to reschedule if needed.    Routing to the CP for awareness.    Concetta Maloney  Community Health Worker  Community Paramedic program  [phone]  [email or web address]

## 2023-10-14 ENCOUNTER — HEALTH MAINTENANCE LETTER (OUTPATIENT)
Age: 58
End: 2023-10-14

## 2023-10-16 ENCOUNTER — PATIENT OUTREACH (OUTPATIENT)
Dept: CARE COORDINATION | Facility: CLINIC | Age: 58
End: 2023-10-16
Payer: COMMERCIAL

## 2023-10-16 NOTE — PROGRESS NOTES
10/16 addendum: Pt called back and confirmed 10/24 visit at 11 am for her flu and COVID vaccinations. Routing to the CP for awareness.    Community Paramedic Program  Community Health Worker Outreach    UTC/Voicemail    Outreach attempted x 1.      Left message on patient's voicemail with call back information and requested return call.    CHW Follow-up Plan: will try to reach patient again in 3-5 business days.    Note: Left detailed msg notifying pt that the CP will be out tomorrow and that we need to reschedule the visit so pt can receive her flu and COVID vaccinations. Offered to reschedule for 10/24 at 11-11:15 am with the CP. Asked pt to call me back to confirm; left my direct dial and notified pt that I would also send a [facility] msg with this information.      Concetta Maloney  Community Health Worker  Community Paramedic program  [phone]  [email or web address]

## 2023-10-21 DIAGNOSIS — G89.4 CHRONIC PAIN SYNDROME: ICD-10-CM

## 2023-10-23 RX ORDER — BACLOFEN 20 MG/1
TABLET ORAL
Qty: 180 TABLET | Refills: 3 | Status: SHIPPED | OUTPATIENT
Start: 2023-10-23

## 2023-10-24 ENCOUNTER — ALLIED HEALTH/NURSE VISIT (OUTPATIENT)
Dept: OTHER | Facility: CLINIC | Age: 58
End: 2023-10-24
Payer: COMMERCIAL

## 2023-10-24 VITALS
SYSTOLIC BLOOD PRESSURE: 118 MMHG | HEART RATE: 66 BPM | DIASTOLIC BLOOD PRESSURE: 80 MMHG | TEMPERATURE: 97 F | RESPIRATION RATE: 14 BRPM | OXYGEN SATURATION: 95 %

## 2023-10-24 DIAGNOSIS — E03.9 HYPOTHYROIDISM, UNSPECIFIED TYPE: ICD-10-CM

## 2023-10-24 DIAGNOSIS — Z23 HIGH PRIORITY FOR 2019-NCOV VACCINE: Primary | ICD-10-CM

## 2023-10-24 DIAGNOSIS — Z23 ENCOUNTER FOR IMMUNIZATION: ICD-10-CM

## 2023-10-24 PROCEDURE — 99207 PR COMMUNITY PARAMEDIC - PATIENT NOT BILLABLE: CPT

## 2023-10-24 PROCEDURE — 90480 ADMN SARSCOV2 VAC 1/ONLY CMP: CPT

## 2023-10-24 PROCEDURE — 91320 SARSCV2 VAC 30MCG TRS-SUC IM: CPT

## 2023-10-24 NOTE — PROGRESS NOTES
Community Paramedic One Time Visit    October 24, 2023; 1!:10 AM    Lyn Russ is a 58 year old year old adult being seen at home visit    Present at appointment:  Patient Lacey and Community Paramedic [place]    Vitals:  BP Readings from Last 1 Encounters:   10/24/23 118/80     Pulse Readings from Last 1 Encounters:   10/24/23 66     Wt Readings from Last 1 Encounters:   No data found for Wt     Ht Readings from Last 1 Encounters:   No data found for Ht         Clinical Concerns:  Current Medical Concerns:  Need for vaccination, COVID and Influenza   Education Provided to patient: Monitor for signs and symptoms of allergic reaction to vaccine, call 911 if needed   Flu Shot given: Yes  COVID Vaccine Given: Yes  Lab draw or specimen collection: No      Plan:     Time spent with patient: 45    The patient meets one or more of the following criteria:  * Requires services to prevent readmission to a nursing home or hospital    Acute concern/Follow-up recommendations: I provided Lacey with vaccines for COVID and Influenza per orders. I remained with Lacey for 15 minutes after the injections. No adverse reactions noted.     Issues for Provider to follow up on: Community Paramedic visited patient in home, provided one-time visit.     Lacey requested a future appt for her TSH lab draw. I did not see a future or standing order for this so I sent a message to Dr. Mahmood.   I will be able to provide this for Lacey if needed and the orders are placed.

## 2023-10-26 ENCOUNTER — PATIENT OUTREACH (OUTPATIENT)
Dept: CARE COORDINATION | Facility: CLINIC | Age: 58
End: 2023-10-26
Payer: COMMERCIAL

## 2023-10-26 NOTE — PROGRESS NOTES
Community Paramedic Program  Community Health Worker Outreach    UT/Voicemail    Outreach attempted x 1.      Left message on patient's voicemail with call back information and requested return call.    CHW Follow-up Plan: will try to reach patient again in one week.    Note: Left msg confirming that CP passed pt's request along for another visit for a TSH lab draw and PCP sent us a new referral. Per referral, PCP would like lab drawn between 11/15 and 12/15. Asked pt to call me back with her preferred day and time so I can help schedule. Also offered to send a [employer] msg to see if we can connect that way. Routing msg to CP for awareness and will ask CP to keep pt enrolled in the program at this time.    Referral source: Pt's PCP  Reason(s) for visit: Labs/Injections (please ensure orders have been placed in Epic) lab draw in order to recheck TSH level    Other    Goals for visit(s): Other    How often should patient be seen: Other once   Preference on when patient should be seen: Other between 11/15/23 and 12/15/23 ideally for lab recheck     Lab order(s) placed: yes (10/25/23)    Timed or fasting lab requested: no    Concetta Maloney  Community Health Worker  Community Paramedic program  [phone]  [email or web address]

## 2023-11-02 ENCOUNTER — PATIENT OUTREACH (OUTPATIENT)
Dept: CARE COORDINATION | Facility: CLINIC | Age: 58
End: 2023-11-02
Payer: COMMERCIAL

## 2023-11-02 NOTE — PROGRESS NOTES
11/6 addendum: Received a voicemail message from pt on Friday, 11/3, confirming that she's available for the CP to visit for a lab draw at home on 11/15 at 1 pm. She asked me to call her back and leave a confirmation on her voicemail. Left pt detailed msg confirming her upcoming CP visit. Also notified pt that the CP program is ending December 1st and offered to send her another resource for in home lab draws in the future ([facility]). Routing to pt's PCP and the CP for awareness.    Community Paramedic Program  Community Health Worker Outreach    UTC/Voicemail    Outreach attempted x 2.      Left message on patient's voicemail with call back information and requested return call.    CHW Follow-up Plan: will try to reach patient again in 3-5 business days.    Note: Available with CP3 on 11/15 at 1 pm or 11/17 am? Asked pt to call back to confirm visit day and time, based on her preference/schedule. Will attempt one final outreach next week.    Referral source: Pt's PCP   Reason(s) for visit: Labs/Injections (please ensure orders have been placed in Epic) lab draw in order to recheck TSH level    Other    Goals for visit(s): Other    How often should patient be seen: Other once   Preference on when patient should be seen: Other between 11/15/23 and 12/15/23 ideally for lab recheck     Lab order(s) placed: yes (10/25/23)    Timed or fasting lab requested: no      Concetta Maloney  Community Health Worker  Community Paramedic program  [phone]  [email or web address]

## 2023-11-06 ENCOUNTER — TELEPHONE (OUTPATIENT)
Dept: FAMILY MEDICINE | Facility: CLINIC | Age: 58
End: 2023-11-06
Payer: COMMERCIAL

## 2023-11-06 DIAGNOSIS — G89.4 CHRONIC PAIN SYNDROME: ICD-10-CM

## 2023-11-06 RX ORDER — OXYCODONE AND ACETAMINOPHEN 5; 325 MG/1; MG/1
1-2 TABLET ORAL EVERY 6 HOURS PRN
Qty: 240 TABLET | Refills: 0 | Status: SHIPPED | OUTPATIENT
Start: 2023-11-06 | End: 2023-12-01

## 2023-11-06 RX ORDER — FENTANYL 25 UG/1
1 PATCH TRANSDERMAL
Qty: 10 PATCH | Refills: 0 | Status: SHIPPED | OUTPATIENT
Start: 2023-11-06 | End: 2023-12-01

## 2023-11-06 NOTE — TELEPHONE ENCOUNTER
Medication Question or Refill    Contacts         Type Contact Phone/Fax    11/06/2023 07:18 AM CST Phone (Incoming) Lacey Russ ROBINSON (Self) [phone] (M)            What medication are you calling about (include dose and sig)?: Oxycodone, Fentanyl    Preferred Pharmacy:    [id number] - [place], [place] - [address] AT [place] [address] [address] [place] [address]  Phone: [phone] Fax: [phone]      Controlled Substance Agreement on file:   CSA -- Patient Level:     [Media Unavailable] Controlled Substance Agreement - Opioid - Scan on 9/24/2019   [Media Unavailable] Controlled Substance Agreement - Opioid - Scan on 12/20/2018   [Media Unavailable] Controlled Substance Agreement - Opioid - Scan on 2/8/2016: CONTROLLED SUBSTANCE       Who prescribed the medication?: Timoteo    Do you need a refill? Yes    When did you use the medication last? 11/06/23    Patient offered an appointment? No    Do you have any questions or concerns?  No      Could we send this information to you in [facility] or would you prefer to receive a phone call?:   Patient would prefer a phone call   Okay to leave a detailed message?: Yes at Cell number on file:    Telephone Information:   Mobile [phone]

## 2023-11-06 NOTE — TELEPHONE ENCOUNTER
Medication last filled:     Last clinic visit: 8/24/2023 Virtual    Clinic visit frequency required: Q 3 months    Next clinic visit: N/A    Controlled substance agreement on file: Yes:  Date 9/24/2019.    Urine Drug Screen on file:  Yes- 7/24/2018     Pending Prescriptions:                       Disp   Refills    fentaNYL (DURAGESIC) 25 mcg/hr 72 hr patch10 pat*0            Sig: Place 1 patch onto the skin every 72 hours APPLY           1 PATCH ON THE SKIN EVERY 3RD DAY    oxyCODONE-acetaminophen (PERCOCET) 5-325 *240 ta*0            Sig: Take 1-2 tablets by mouth every 6 hours as needed           for moderate to severe pain

## 2023-11-15 ENCOUNTER — ALLIED HEALTH/NURSE VISIT (OUTPATIENT)
Dept: OTHER | Facility: CLINIC | Age: 58
End: 2023-11-15
Payer: COMMERCIAL

## 2023-11-15 ENCOUNTER — LAB (OUTPATIENT)
Dept: LAB | Facility: CLINIC | Age: 58
End: 2023-11-15
Payer: COMMERCIAL

## 2023-11-15 DIAGNOSIS — E03.9 HYPOTHYROIDISM, UNSPECIFIED TYPE: ICD-10-CM

## 2023-11-15 LAB — TSH SERPL DL<=0.005 MIU/L-ACNC: 0.74 UIU/ML (ref 0.3–4.2)

## 2023-11-15 PROCEDURE — 36415 COLL VENOUS BLD VENIPUNCTURE: CPT

## 2023-11-15 PROCEDURE — 84443 ASSAY THYROID STIM HORMONE: CPT

## 2023-11-15 PROCEDURE — 99207 PR COMMUNITY PARAMEDIC - PATIENT NOT BILLABLE: CPT

## 2023-11-15 NOTE — PROGRESS NOTES
Community Paramedic One Time Visit    November 15, 2023; 3:55 PM    Lyn Russ is a 58 year old year old adult being seen at home visit    Present at appointment:  Patient Lacey and Community Paramedic Africa    Vitals:  BP Readings from Last 1 Encounters:   10/24/23 118/80     Pulse Readings from Last 1 Encounters:   10/24/23 66     Wt Readings from Last 1 Encounters:   No data found for Wt     Ht Readings from Last 1 Encounters:   No data found for Ht         Clinical Concerns:  Current Medical Concerns:  Need for lab draw/sample   Flu Shot given: No  COVID Vaccine Given: No  Lab draw or specimen collection: Yes      Plan:   TBD    Time spent with patient: 60    The patient meets one or more of the following criteria:  * Requires services to prevent readmission to a nursing home or hospital    Acute concern/Follow-up recommendations: One- time visit for a lab draw per orders. I brought the sample to a [facility] clinic immediately following the appt.     Issues for Provider to follow up on: Lab value.

## 2023-11-28 ENCOUNTER — PATIENT OUTREACH (OUTPATIENT)
Dept: CARE COORDINATION | Facility: CLINIC | Age: 58
End: 2023-11-28
Payer: COMMERCIAL

## 2023-12-01 DIAGNOSIS — G89.4 CHRONIC PAIN SYNDROME: ICD-10-CM

## 2023-12-01 RX ORDER — FENTANYL 25 UG/1
1 PATCH TRANSDERMAL
Qty: 10 PATCH | Refills: 0 | Status: SHIPPED | OUTPATIENT
Start: 2023-12-01 | End: 2023-12-28

## 2023-12-01 RX ORDER — OXYCODONE AND ACETAMINOPHEN 5; 325 MG/1; MG/1
1-2 TABLET ORAL EVERY 6 HOURS PRN
Qty: 240 TABLET | Refills: 0 | Status: SHIPPED | OUTPATIENT
Start: 2023-12-01 | End: 2023-12-28

## 2023-12-01 NOTE — TELEPHONE ENCOUNTER
Pending Prescriptions:                       Disp   Refills    fentaNYL (DURAGESIC) 25 mcg/hr 72 hr patch10 pat*0            Sig: Place 1 patch onto the skin every 72 hours APPLY           1 PATCH ON THE SKIN EVERY 3RD DAY    oxyCODONE-acetaminophen (PERCOCET) 5-325 *240 ta*0            Sig: Take 1-2 tablets by mouth every 6 hours as needed           for moderate to severe pain    Pt is calling to get medications refilled.

## 2023-12-16 DIAGNOSIS — Z29.89 NEED FOR PROPHYLAXIS AGAINST URINARY TRACT INFECTION: ICD-10-CM

## 2023-12-16 RX ORDER — NITROFURANTOIN MACROCRYSTALS 50 MG/1
CAPSULE ORAL
Qty: 90 CAPSULE | Refills: 3 | Status: SHIPPED | OUTPATIENT
Start: 2023-12-16

## 2023-12-28 DIAGNOSIS — G89.4 CHRONIC PAIN SYNDROME: ICD-10-CM

## 2023-12-28 RX ORDER — FENTANYL 25 UG/1
1 PATCH TRANSDERMAL
Qty: 10 PATCH | Refills: 0 | Status: SHIPPED | OUTPATIENT
Start: 2023-12-28 | End: 2024-01-25

## 2023-12-28 RX ORDER — OXYCODONE AND ACETAMINOPHEN 5; 325 MG/1; MG/1
1-2 TABLET ORAL EVERY 6 HOURS PRN
Qty: 240 TABLET | Refills: 0 | Status: SHIPPED | OUTPATIENT
Start: 2023-12-28 | End: 2024-01-25

## 2023-12-28 NOTE — TELEPHONE ENCOUNTER
Requested Medication:   Pending Prescriptions:                       Disp   Refills    oxyCODONE-acetaminophen (PERCOCET) 5-325 *240 ta*0            Sig: Take 1-2 tablets by mouth every 6 hours as needed           for moderate to severe pain    fentaNYL (DURAGESIC) 25 mcg/hr 72 hr patch10 pat*0            Sig: Place 1 patch onto the skin every 72 hours APPLY           1 PATCH ON THE SKIN EVERY 3RD DAY       Last Refill:  12/1/2023    Last Office Visit:  Visit date not found     Next Appointment with Provider:  Visit date not found   Future Appointments 12/28/2023 - 6/25/2024        Date Visit Type Length Department Provider     1/18/2024  5:30 PM OFFICE VISIT 20 min [facility] Brayden Mahmood MD    Location Instructions:     [facility] is in [address] [facility] the [facility] at [address] This is near the [facility] [address] exit off of [place] [address]. From the exit, turn east on [address], then north on [facility]. Free lot parking is available. Through the main entrance, [address] is to the right on the first floor.                     Clinic visit frequency required: Q 3 months    Controlled substance agreement on file: No.    Urine Drug Screen on file:  No

## 2024-01-17 ASSESSMENT — ANXIETY QUESTIONNAIRES
2. NOT BEING ABLE TO STOP OR CONTROL WORRYING: NOT AT ALL
3. WORRYING TOO MUCH ABOUT DIFFERENT THINGS: NOT AT ALL
5. BEING SO RESTLESS THAT IT IS HARD TO SIT STILL: NOT AT ALL
4. TROUBLE RELAXING: NOT AT ALL
GAD7 TOTAL SCORE: 0
1. FEELING NERVOUS, ANXIOUS, OR ON EDGE: NOT AT ALL
7. FEELING AFRAID AS IF SOMETHING AWFUL MIGHT HAPPEN: NOT AT ALL
7. FEELING AFRAID AS IF SOMETHING AWFUL MIGHT HAPPEN: NOT AT ALL
6. BECOMING EASILY ANNOYED OR IRRITABLE: NOT AT ALL

## 2024-01-17 ASSESSMENT — PATIENT HEALTH QUESTIONNAIRE - PHQ9
SUM OF ALL RESPONSES TO PHQ QUESTIONS 1-9: 4
SUM OF ALL RESPONSES TO PHQ QUESTIONS 1-9: 4

## 2024-01-18 ENCOUNTER — VIRTUAL VISIT (OUTPATIENT)
Dept: FAMILY MEDICINE | Facility: CLINIC | Age: 59
End: 2024-01-18
Payer: COMMERCIAL

## 2024-01-18 DIAGNOSIS — E03.9 HYPOTHYROIDISM, UNSPECIFIED TYPE: ICD-10-CM

## 2024-01-18 DIAGNOSIS — F32.0 CURRENT MILD EPISODE OF MAJOR DEPRESSIVE DISORDER WITHOUT PRIOR EPISODE (H): ICD-10-CM

## 2024-01-18 DIAGNOSIS — R60.9 EDEMA, UNSPECIFIED TYPE: ICD-10-CM

## 2024-01-18 DIAGNOSIS — G82.20 PARAPLEGIA (H): ICD-10-CM

## 2024-01-18 DIAGNOSIS — R33.9 URINARY RETENTION: ICD-10-CM

## 2024-01-18 DIAGNOSIS — G89.4 CHRONIC PAIN SYNDROME: Primary | ICD-10-CM

## 2024-01-18 PROCEDURE — 99214 OFFICE O/P EST MOD 30 MIN: CPT | Mod: 95 | Performed by: FAMILY MEDICINE

## 2024-01-18 NOTE — PROGRESS NOTES
Lacey is a 58 year old who is being evaluated via a billable video visit.      How would you like to obtain your AVS? MyChart  If the video visit is dropped, the invitation should be resent by: Text to cell phone: [phone]  Will anyone else be joining your video visit? No          Chronic Pain Syndrome  Chronic pain syndrome, stable.  Fentanyl 25 mcg every 3 days and oxycodone acetaminophen 5/325 using 2 tablets 4 times daily number 240/month with PDMP website reviewed and up-to-date.    Paraparesis (Lower Extremities)  Lower extremity paraparesis.  Home care referral placed.  - Home Care Referral    Hypothyroidism, unspecified type  Hypothyroidism.  Levothyroxine 125 mcg daily.  Recent TSH did decrease to 0.74 on November 15, 2023 and will repeat TSH to ensure stable or mid level in order to avoid further risk for worsening osteoporosis.  - TSH with free T4 reflex  - Home Care Referral    Current mild episode of major depressive disorder without prior episode (H24)  Paroxetine 40 mg daily continuing.    Urinary retention  Oxybutynin 5 mg using 2 tablets 3 times daily.    Edema, unspecified type  Furosemide 20 mg each morning.           Subjective   Lacey is a 58 year old, presenting for the following health issues:  Recheck Medication        1/18/2024     5:10 PM   Additional Questions   Roomed by      History of Present Illness       Reason for visit:  Med update    She eats 2-3 servings of fruits and vegetables daily.She consumes 2 sweetened beverage(s) daily.She exercises with enough effort to increase her heart rate 9 or less minutes per day.  She exercises with enough effort to increase her heart rate 3 or less days per week.   She is taking medications regularly.         Virtual visit completed today.  Follow-up chronic health concerns. 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 Continues utilization of fentanyl patch 25 mcg every 72 hours as well as use of  oxycodone acetaminophen 5/325 with continued use of 2 tablets 4 times daily on scheduled basis. Doing well with this and denies concerns currently for opioid-induced constipation etc. Mood has remained well controlled with paroxetine 40 mg daily with history of depression and anxiety. UTI prophylaxis with nitrofurantoin 50 mg daily without current concerns for recurrent UTI. Patient continues levothyroxine for thyroid replacement currently 125 mcg daily.  Cologuard testing - June 1, 2023.  FSH elevated 137 previously with most recent vitamin D level normal at 44.        Review of Systems  Constitutional, HEENT, cardiovascular, pulmonary, GI, , musculoskeletal, neuro, skin, endocrine and psych systems are negative, except as otherwise noted.      Objective           Vitals:  No vitals were obtained today due to virtual visit.    Physical Exam   GENERAL: alert and no distress  EYES: Eyes grossly normal to inspection.  No discharge or erythema, or obvious scleral/conjunctival abnormalities.  RESP: No audible wheeze, cough, or visible cyanosis.    SKIN: Visible skin clear. No significant rash, abnormal pigmentation or lesions.  NEURO: Cranial nerves grossly intact.  Mentation and speech appropriate for age.  PSYCH: Appropriate affect, tone, and pace of words          Video-Visit Details    Type of service:  Video Visit   Video Start Time: 5:34 PM  Video End Time:5:50 PM    Originating Location (pt. Location): Home    Distant Location (provider location):  On-site  Platform used for Video Visit: Wilver  Signed Electronically by: Brayden Mahmood MD

## 2024-01-22 DIAGNOSIS — F32.0 CURRENT MILD EPISODE OF MAJOR DEPRESSIVE DISORDER WITHOUT PRIOR EPISODE (H): ICD-10-CM

## 2024-01-22 DIAGNOSIS — E03.9 HYPOTHYROIDISM, UNSPECIFIED TYPE: ICD-10-CM

## 2024-01-22 RX ORDER — LEVOTHYROXINE SODIUM 125 UG/1
TABLET ORAL
Qty: 90 TABLET | OUTPATIENT
Start: 2024-01-22

## 2024-01-23 RX ORDER — PAROXETINE 20 MG/1
20 TABLET, FILM COATED ORAL DAILY
Qty: 90 TABLET | Refills: 3 | Status: SHIPPED | OUTPATIENT
Start: 2024-01-23

## 2024-01-23 RX ORDER — PAROXETINE 20 MG/1
20 TABLET, FILM COATED ORAL DAILY
Qty: 90 TABLET | Refills: 3 | Status: SHIPPED | OUTPATIENT
Start: 2024-01-23 | End: 2024-01-23

## 2024-01-25 DIAGNOSIS — G89.4 CHRONIC PAIN SYNDROME: ICD-10-CM

## 2024-01-25 RX ORDER — OXYCODONE AND ACETAMINOPHEN 5; 325 MG/1; MG/1
1-2 TABLET ORAL EVERY 6 HOURS PRN
Qty: 240 TABLET | Refills: 0 | Status: SHIPPED | OUTPATIENT
Start: 2024-01-25 | End: 2024-02-21

## 2024-01-25 RX ORDER — FENTANYL 25 UG/1
1 PATCH TRANSDERMAL
Qty: 10 PATCH | Refills: 0 | Status: SHIPPED | OUTPATIENT
Start: 2024-01-25 | End: 2024-02-21

## 2024-01-25 NOTE — TELEPHONE ENCOUNTER
Refill Request  Medication name: Pending Prescriptions:                       Disp   Refills    fentaNYL (DURAGESIC) 25 mcg/hr 72 hr patch10 pat*0            Sig: Place 1 patch onto the skin every 72 hours APPLY           1 PATCH ON THE SKIN EVERY 3RD DAY    oxyCODONE-acetaminophen (PERCOCET) 5-325 *240 ta*0            Sig: Take 1-2 tablets by mouth every 6 hours as needed           for moderate to severe pain    Requested Pharmacy:  [id number] [place], [place] - [address] AT [facility] [address] [facility]

## 2024-01-29 ENCOUNTER — TELEPHONE (OUTPATIENT)
Dept: FAMILY MEDICINE | Facility: CLINIC | Age: 59
End: 2024-01-29
Payer: COMMERCIAL

## 2024-01-29 ENCOUNTER — MEDICAL CORRESPONDENCE (OUTPATIENT)
Dept: HEALTH INFORMATION MANAGEMENT | Facility: CLINIC | Age: 59
End: 2024-01-29

## 2024-01-29 NOTE — TELEPHONE ENCOUNTER
Home Care is calling regarding an established patient with [facility].       Requesting orders from: Brayden Mahmood  Provider is following patient: Yes  Is this a 60-day recertification request?  No    Orders Requested    Skilled Nursing  Request for initial certification (first set of orders)   Frequency:  1x/wk for 4 wks  then 1 every other week x/wk for 4 wks  2 prn visits  Also requesting orders for labs to be drawn in the home.  Patient states she knows she needs a thyroid level done.  Any other needed please order.           Information was gathered and will be sent to provider for review.  RN will contact Home Care with information after provider review.  Confirmed ok to leave a detailed message with call back Mary at [phone].    Shauna Cunningham, WUN

## 2024-01-29 NOTE — TELEPHONE ENCOUNTER
General Call      Reason for Call:  orders/homecare     Skilled nursing 1x4w and skilled nurse EOW x4 weeks 2 PRN for lab draws     Needs TSH labs done, can be done at home with home nursing  What labs and what day to be done  Wheel chair currently broken    PT/OT    No need for medical social worker-but wanting to know what that was needed for, accept if it is needed    Wants to know if a mills cath is indicated. Has a lot of fluid build up.

## 2024-01-29 NOTE — TELEPHONE ENCOUNTER
"Per PCP,  \"Okay for requested orders.    Brayden Mahmood MD\"     Writer called and gave verbal orders per PCP.     Home care plans to do lab draw on Friday and will bring samples to either [place] or [facility].     YASMIN Lindsey, RN  [facility]    "

## 2024-02-02 ENCOUNTER — LAB REQUISITION (OUTPATIENT)
Dept: LAB | Facility: CLINIC | Age: 59
End: 2024-02-02
Payer: COMMERCIAL

## 2024-02-02 DIAGNOSIS — E03.9 HYPOTHYROIDISM, UNSPECIFIED: ICD-10-CM

## 2024-02-02 LAB
T4 FREE SERPL-MCNC: 1.56 NG/DL (ref 0.9–1.7)
TSH SERPL DL<=0.005 MIU/L-ACNC: 3.35 UIU/ML (ref 0.3–4.2)

## 2024-02-02 PROCEDURE — 84443 ASSAY THYROID STIM HORMONE: CPT | Mod: ORL | Performed by: FAMILY MEDICINE

## 2024-02-02 PROCEDURE — 36415 COLL VENOUS BLD VENIPUNCTURE: CPT | Mod: ORL | Performed by: FAMILY MEDICINE

## 2024-02-02 PROCEDURE — 84439 ASSAY OF FREE THYROXINE: CPT | Mod: ORL | Performed by: FAMILY MEDICINE

## 2024-02-11 DIAGNOSIS — R60.9 EDEMA, UNSPECIFIED TYPE: ICD-10-CM

## 2024-02-12 RX ORDER — FUROSEMIDE 20 MG
TABLET ORAL
Qty: 90 TABLET | Refills: 0 | Status: SHIPPED | OUTPATIENT
Start: 2024-02-12 | End: 2024-04-17

## 2024-02-16 ENCOUNTER — PATIENT OUTREACH (OUTPATIENT)
Dept: GERIATRICS | Facility: CLINIC | Age: 59
End: 2024-02-16
Payer: COMMERCIAL

## 2024-02-19 ENCOUNTER — TELEPHONE (OUTPATIENT)
Dept: FAMILY MEDICINE | Facility: CLINIC | Age: 59
End: 2024-02-19
Payer: COMMERCIAL

## 2024-02-19 NOTE — TELEPHONE ENCOUNTER
General Call    Contacts         Type Contact Phone/Fax    02/19/2024 11:59 AM CST Phone (Incoming) Cherie ([facility]) [phone]     [facility]          Reason for Call:  FYI    What are your questions or concerns:  Cherie from [facility] called and stated that they are discharging the patient without a visit. Caller stated patient asked to be discharged, and they haven't been able to get a hold of the patient for a visit.     Could we send this information to you in [facility] or would you prefer to receive a phone call?:   Patient would prefer a phone call   Okay to leave a detailed message?: Yes at Other phone number:  [phone]

## 2024-02-20 DIAGNOSIS — F32.0 CURRENT MILD EPISODE OF MAJOR DEPRESSIVE DISORDER WITHOUT PRIOR EPISODE (H): ICD-10-CM

## 2024-02-20 RX ORDER — PAROXETINE 40 MG/1
TABLET, FILM COATED ORAL
Qty: 90 TABLET | Refills: 3 | OUTPATIENT
Start: 2024-02-20

## 2024-02-20 NOTE — TELEPHONE ENCOUNTER
In-Home Annual Wellness Outreach    Call attempt: 1 of 2    Was call answered? No. Left voicemail for patient with information to call back.    Reason for call: contacted patient regarding scheduling her in-home annual wellness visit.

## 2024-02-21 DIAGNOSIS — G89.4 CHRONIC PAIN SYNDROME: ICD-10-CM

## 2024-02-21 RX ORDER — FENTANYL 25 UG/1
1 PATCH TRANSDERMAL
Qty: 10 PATCH | Refills: 0 | Status: SHIPPED | OUTPATIENT
Start: 2024-02-21 | End: 2024-03-18

## 2024-02-21 RX ORDER — OXYCODONE AND ACETAMINOPHEN 5; 325 MG/1; MG/1
1-2 TABLET ORAL EVERY 6 HOURS PRN
Qty: 240 TABLET | Refills: 0 | Status: SHIPPED | OUTPATIENT
Start: 2024-02-21 | End: 2024-03-18

## 2024-02-21 NOTE — TELEPHONE ENCOUNTER
Medication Question or Refill    Contacts         Type Contact Phone/Fax    02/21/2024 08:28 AM CST Phone (Incoming) Lacey Russ ROBINSON (Self) [phone] (M)            What medication are you calling about (include dose and sig)?: oxyCODONE-acetaminophen (PERCOCET) 5-325 MG tablet    fentaNYL (DURAGESIC) 25 mcg/hr 72 hr patch    Preferred Pharmacy:  [facility] #[id number] [place], [place] - [address] AT [place] [address] [address] [place] [address]  Phone: [phone] Fax: [phone]      Controlled Substance Agreement on file:   CSA -- Patient Level:     [Media Unavailable] Controlled Substance Agreement - Opioid - Scan on 9/24/2019   [Media Unavailable] Controlled Substance Agreement - Opioid - Scan on 12/20/2018   [Media Unavailable] Controlled Substance Agreement - Opioid - Scan on 2/8/2016: CONTROLLED SUBSTANCE       Who prescribed the medication?: Timoteo    Do you need a refill? Yes    When did you use the medication last? N/A    Patient offered an appointment? No    Do you have any questions or concerns?  No      Could we send this information to you in [facility] or would you prefer to receive a phone call?:   Patient would prefer a phone call   Okay to leave a detailed message?: Yes at Home number on file [phone] (home)

## 2024-02-22 DIAGNOSIS — G89.4 CHRONIC PAIN SYNDROME: Primary | ICD-10-CM

## 2024-02-22 DIAGNOSIS — E03.9 HYPOTHYROIDISM, UNSPECIFIED TYPE: ICD-10-CM

## 2024-02-22 RX ORDER — LEVOTHYROXINE SODIUM 125 UG/1
TABLET ORAL
Qty: 30 TABLET | Refills: 3 | Status: SHIPPED | OUTPATIENT
Start: 2024-02-22 | End: 2024-04-21

## 2024-02-23 ENCOUNTER — PATIENT OUTREACH (OUTPATIENT)
Dept: CARE COORDINATION | Facility: CLINIC | Age: 59
End: 2024-02-23
Payer: COMMERCIAL

## 2024-02-23 NOTE — LETTER
February 26, 2024      RUPESH HUNTER  [address] [address]      Dear Rupesh:    Loki, my name is Julianne Maloney. You are eligible for [facility] Case Management program through your enrollment in [employer]. We think you may benefit from this program. I am writing to invite you to be in our Case Management program.     The following are a few things the Case Management program can help you with:  Select or change your primary care doctor or primary care clinic  Find a specialist, if needed, near your home  Receive preventive care, such as flu shots  Join programs offered by [facility] that interest you, like wellness programs    As your , I will do the following to enroll you in the Case Management Program:  Schedule a telephone call with you to answer any questions you may have about case management  Conduct an assessment by phone to identify needs case management can help you with  Develop a care plan to address those needs  Help you obtain available care and resources as needed    I will call you soon to discuss your interest in this program and your health care needs.    Being in the Case Management program is voluntary and offered to you at no cost. If you accept being in the Case Management program, you can stop any time by calling me at [phone].    Sincerely,    Julianne Maloney  Community Health Worker   [facility] Care Coordination  [facility]   [email or web address]  Office: [phone]            [address] [place] [address]  [phone]  fax [phone]  [facility]    [id number]                                   (01/2020)

## 2024-02-23 NOTE — PROGRESS NOTES
Clinic Care Coordination Contact  [facility]/Voicemail    Clinical Data: Care Coordinator Outreach    Outreach Documentation Number of Outreach Attempt   2/23/2024   2:21 PM 1       Left message on patient's voicemail with call back information and requested return call.    Plan: Care Coordinator will try to reach patient again in 1-2 business days or on 2/26/24.    Comments    Due to CP program ending, Lenore/Kimberly referred patient to  Geriatrics team to see if they could do a home visit. Home visit team is attempting to reach patient to schedule an in-home visit, [facility] x1.  CC order placed to outreach to the patient, assess needs and possibly help facilitate patient getting connected with  Geriatrics to schedule an in-home visit with their provider Marva Newby            Order Questions    Question Answer   Reason for Referral: Patient/Caregiver Support   Patient/Caregiver Suport: Resources for Support   Clinical Staff have discussed the Care Coordination Referral with the patient and/or caregiver: No         Julianne Maloney  Community Health Worker   [facility] Care Coordination  [facility]   [email or web address]  Office: [phone]

## 2024-02-23 NOTE — LETTER
[facility] CARE COORDINATION  [facility]   February 26, 2024    Lyn Russ  [address] [address]      Dear Lyn,    I am a clinic care coordinator who works with Brayden Mahmood MD with the [facility]. I have been trying to reach you recently to introduce Clinic Care Coordination. Below is a description of clinic care coordination and how I can further assist you.       The clinic care coordination team is made up of a registered nurse, , financial resource worker and community health worker who understand the health care system. The goal of clinic care coordination is to help you manage your health and improve access to the health care system. Our team works alongside your provider to assist you in determining your health and social needs. We can help you obtain health care and community resources, providing you with necessary information and education. We can work with you through any barriers and develop a care plan that helps coordinate and strengthen the communication between you and your care team.  Our services are voluntary and are offered without charge to you personally.    Please feel free to contact me with any questions or concerns regarding care coordination and what we can offer.      We are focused on providing you with the highest-quality healthcare experience possible.    Sincerely,     Julianne Maloney  Community Health Worker   [facility] Care Coordination  [facility] & [facility]   [email or web address]  Office: [phone]

## 2024-02-26 NOTE — PROGRESS NOTES
Clinic Care Coordination Contact  [facility]/Voicemail    Clinical Data: Care Coordinator Outreach    Outreach Documentation Number of Outreach Attempt   2/23/2024   2:21 PM 1   2/26/2024  11:00 AM 2       Left message on patient's voicemail with call back information and requested return call.    Plan: Care Coordinator will send care coordination introduction letter with care coordinator contact information and explanation of care coordination services via [employer]. Care Coordinator will do no further outreaches at this time.      Julianne Maloney  Community Health Worker   [facility] Care Coordination  [facility]   [email or web address]  Office: [phone]

## 2024-02-27 ENCOUNTER — PATIENT OUTREACH (OUTPATIENT)
Dept: CARE COORDINATION | Facility: CLINIC | Age: 59
End: 2024-02-27
Payer: COMMERCIAL

## 2024-02-27 ENCOUNTER — MYC MEDICAL ADVICE (OUTPATIENT)
Dept: CARE COORDINATION | Facility: CLINIC | Age: 59
End: 2024-02-27

## 2024-02-27 NOTE — PROGRESS NOTES
Clinic Care Coordination Contact  Community Health Worker Initial Outreach    CHW Initial Information Gathering:  Referral Source: PCP  Preferred Hospital: [facility]  [phone]  Current living arrangement:: I live in a private home with family  Type of residence:: Private home - no stairs  Community Resources: None  Supplies Currently Used at Home: None  Equipment Currently Used at Home: wheelchair, manual  No PCP office visit in Past Year: No  Transportation means:: Family  CHW Additional Questions  MyChart active?: Yes  Patient sent Social Determinants of Health questionnaire?: Yes    Patient accepts CC: Yes. Patient scheduled for assessment with CCC DONNIE Leblanc on 3/4/24 at 2PM. Patient noted desire to discuss home care resources/assistance obtaining medical equipment.     CHW Note:  CHW received VM from patient requesting return call. CHW contacted patient back as requested and to review referral that was placed for CCC.  Patient shared she is looking for a home care resource that can accommodate to an evening schedule as [facility] was unable to accommodate this request.   Patient shared she would also like assistance navigating how to obtain a new wheel chair.  CHW scheduled patient with CCC DONNIE Leblanc on 3/4/24 at 2PM for assistance exploring home care resources and obtaining medical equipment.  Patient was grateful for the return call and assistance with navigating resources.     Comments    Due to CP program ending, Lenore/Kimberly referred patient to  Geriatrics team to see if they could do a home visit. Home visit team is attempting to reach patient to schedule an in-home visit, [facility] x1.  CC order placed to outreach to the patient, assess needs and possibly help facilitate patient getting connected with  Geriatrics to schedule an in-home visit with their provider Marva Newby            Order Questions    Question Answer   Reason for Referral: Patient/Caregiver Support    Patient/Caregiver Suport: Resources for Support   Clinical Staff have discussed the Care Coordination Referral with the patient and/or caregiver: No         Julianne Maloney  Community Health Worker   [facility] Care Coordination  [facility]   [email or web address]  Office: [phone]

## 2024-03-02 SDOH — HEALTH STABILITY: PHYSICAL HEALTH: ON AVERAGE, HOW MANY MINUTES DO YOU ENGAGE IN EXERCISE AT THIS LEVEL?: 0 MIN

## 2024-03-02 SDOH — HEALTH STABILITY: PHYSICAL HEALTH: ON AVERAGE, HOW MANY DAYS PER WEEK DO YOU ENGAGE IN MODERATE TO STRENUOUS EXERCISE (LIKE A BRISK WALK)?: 0 DAYS

## 2024-03-02 ASSESSMENT — ACTIVITIES OF DAILY LIVING (ADL)
WHAT_SELF-CARE_TASKS_DO_YOU_REQUIRE_ASSISTANCE_WITH?: WHEELCHAIR-WITH ASSIST
WHAT_ACTIVITIES_OF_DAILY_LIVING_DO_YOU_REQUIRE_ASSISTANCE_WITH?: INCONTINENCE
WHAT_SELF-CARE_TASKS_DO_YOU_REQUIRE_ASSISTANCE_WITH?: EATING
WHAT_ACTIVITIES_OF_DAILY_LIVING_DO_YOU_REQUIRE_ASSISTANCE_WITH?: CLEANING
WHAT_SELF-CARE_TASKS_DO_YOU_REQUIRE_ASSISTANCE_WITH?: TRANSFERS
WHAT_SELF-CARE_TASKS_DO_YOU_REQUIRE_ASSISTANCE_WITH?: DRESSING
WHAT_SELF-CARE_TASKS_DO_YOU_REQUIRE_ASSISTANCE_WITH?: INCONTINENCE
WHAT_SELF-CARE_TASKS_DO_YOU_REQUIRE_ASSISTANCE_WITH?: POSITIONING
WHAT_ACTIVITIES_OF_DAILY_LIVING_DO_YOU_REQUIRE_ASSISTANCE_WITH?: SHOPPING
WHAT_SELF-CARE_TASKS_DO_YOU_REQUIRE_ASSISTANCE_WITH?: BATHING
WHAT_ACTIVITIES_OF_DAILY_LIVING_DO_YOU_REQUIRE_ASSISTANCE_WITH?: LAUNDRY
WHAT_ACTIVITIES_OF_DAILY_LIVING_DO_YOU_REQUIRE_ASSISTANCE_WITH?: COOKING
WHAT_ACTIVITIES_OF_DAILY_LIVING_DO_YOU_REQUIRE_ASSISTANCE_WITH?: MEAL PREPARATION
DEPENDENT_IADLS:: CLEANING;COOKING;LAUNDRY;SHOPPING;MEAL PREPARATION;INCONTINENCE

## 2024-03-02 ASSESSMENT — SOCIAL DETERMINANTS OF HEALTH (SDOH)
DO YOU BELONG TO ANY CLUBS OR ORGANIZATIONS SUCH AS CHURCH GROUPS UNIONS, FRATERNAL OR ATHLETIC GROUPS, OR SCHOOL GROUPS?: NO
HOW OFTEN DO YOU GET TOGETHER WITH FRIENDS OR RELATIVES?: ONCE A WEEK
HOW OFTEN DO YOU ATTEND CHURCH OR RELIGIOUS SERVICES?: NEVER
HOW OFTEN DO YOU ATTENT MEETINGS OF THE CLUB OR ORGANIZATION YOU BELONG TO?: NEVER
ARE YOU MARRIED, WIDOWED, DIVORCED, SEPARATED, NEVER MARRIED, OR LIVING WITH A PARTNER?: NEVER MARRIED
IN A TYPICAL WEEK, HOW MANY TIMES DO YOU TALK ON THE PHONE WITH FAMILY, FRIENDS, OR NEIGHBORS?: MORE THAN THREE TIMES A WEEK

## 2024-03-02 ASSESSMENT — LIFESTYLE VARIABLES
AUDIT-C TOTAL SCORE: 0
SKIP TO QUESTIONS 9-10: 1
HOW OFTEN DO YOU HAVE SIX OR MORE DRINKS ON ONE OCCASION: NEVER
HOW MANY STANDARD DRINKS CONTAINING ALCOHOL DO YOU HAVE ON A TYPICAL DAY: PATIENT DOES NOT DRINK
HOW OFTEN DO YOU HAVE A DRINK CONTAINING ALCOHOL: NEVER

## 2024-03-02 NOTE — COMMUNITY RESOURCES LIST (ENGLISH)
03/02/2024   [facility] Simplesurance  N/A  For questions about this resource list or additional care needs, please contact your primary care clinic or care manager.  Phone: [phone]   Email: N/A   Address: [address] [place] [address]   Hours: N/A        Exercise and Recreation       Gym or workout facility  1  [employer] - [facility] [address] Distance: 0.42 miles      In-Person   [address] [place]  [place] [address]  Language: English  Hours: Mon - Sun Open 24 Hours  Fees: Insurance, Self Pay, Sliding Fee   Phone: (165) 425-2350 Email: sonya@[employer] Website: https://www.[employer]/gyms/198/djdaokav-sa-19025/     2  [facility] [place] [facility] Distance: 5.61 miles      In-Person   [address] [place] [address]  Language: English  Hours: Mon - Fri 9:00 AM - 9:00 PM , Sat 9:00 AM - 7:00 PM  Fees: Free, Self Pay   Phone: (415) 273-5861 Email: juana@.[facility]. Website: https://www.[facility] [place].[facility]/facilities/[facility]          Hotlines and Helplines       Hotline - Housing crisis  3  [facility] Distance: 18.01 miles      Phone/Virtual   [address] [place] [address]  Language: English  Hours: Mon - Sun Open 24 Hours   Phone: (105) 288-7457 Email: communications@[facility].org Website: https://[facility].org/oursaviourshousing/     4  [facility] (Main Office) Distance: 18.6 miles      Phone/Virtual   [address] [place] [address]  Language: English  Hours: Mon - Sun Open 24 Hours   Phone: (399) 703-6143 Email: info@[facility].org Website: http://[facility].org          Housing       Coordinated Entry access point  [address] [place] [address] Distance: 10.12 miles      In-Person, Phone/Virtual   [address] [place] [address]  Language: English  Hours: Mon - Fri 8:30 AM - 4:30 PM  Fees: Free   Phone: (448) 312-8338  Email: info@[facility]care.org Website: https://www.[facility].org/locations/[facility]-clinic/     6  [facility] - Housing Services Distance: 18.18 miles      In-Person   [address] [place] [address]  Language: English  Hours: Mon - Fri 9:00 AM - 5:00 PM  Fees: Free   Phone: (909) 978-4682 Email: housing@[facility].org Website: http://www.[facility].org/housing     Drop-in center or day shelter  7  [facility] Distance: 8.92 miles      In-Person   [address] [place] [address]  Language: English  Hours: Mon - Fri 9:00 AM - 4:00 PM  Fees: Free   Phone: (163) 908-5294 Email: fronthadleyk@[employer] Website: http://[employer]     8  [facility] Distance: 18.09 miles      In-Person   [address] [place] [address]  Language: English, Danish, Albanian  Hours: Mon - Sat 7:00 AM - 3:00 PM  Fees: Free, Self Pay   Phone: (204) 890-5476 Email: info@[employer] Website: https://www.[employer].org/locations/opportunity-center/     Housing search assistance  9  [facility] - Housing Resources Distance: 2.83 miles      In-Person, Phone/Virtual   [address] [place] [address]  Language: English  Hours: Mon - Fri 8:00 AM - 4:30 PM  Fees: Free   Phone: (817) 881-1406 Email: office@vis[employer].org Website: http://w[facility]Mind-[employer]G.org     10  [facility] [place] - Homeless Resources and Housing Information - Housing search assistance Distance: 7.84 miles      In-Person, Phone/Virtual   [address] [place] [address]  Language: English  Hours: Mon - Fri 8:00 AM - 4:30 PM  Fees: Free   Phone: (288) 490-5011 Email: marlyn@[facility]. Website: https://www.[facility].us/Facilities/Facility/Details/[place]-22     Shelter for families  11  [facility]  [facility] Distance: 16.19 miles      In-Person   [address] [facility] [place] [address]  Language: English  Hours: Mon - Fri 3:00 PM - 9:00 AM , Sat - Sun Open 24 Hours  Fees: Free   Phone: (370) 852-6105 Ext.1 Website: https://www.[facility]."[employer]"/2020/07/03/emergency-family-shelter/     12  [facility] Distance: 21.27 miles      In-Person   [address] [place] [address]  Language: English  Hours: Mon - Sun Open 24 Hours  Fees: Free, Self Pay   Phone: (996) 329-4625 Email: Wilfredo@[facility] Website: http://www.[facility].org/     Shelter for individuals  13  [facility] [place] - Homeless Resources and Housing Information - Emergency housing Distance: 7.84 miles      In-Person, Phone/Virtual   [address] [place] [address]  Language: English  Hours: Mon - Fri 8:00 AM - 4:30 PM  Fees: Free   Phone: (190) 562-7636 Email: [employer]@[facility]. Website: https://www.[facility]./Facilities/Facility/Details/[place]-[facility]-22     14  [facility] - Homeless Resources and Housing Information - Emergency housing Distance: 8.47 miles      In-Person, Phone/Virtual   [address] [place] [address]  Language: English  Hours: Mon - Fri 8:00 AM - 4:30 PM  Fees: Free   Phone: (205) 802-8009 Email: [employer]sha[employer]@[facility]. Website: https://www.[facility]./469/Community-Services          Transportation       Free or low-cost transportation  15  [facility] -  Office - [facility] - Gas vouchers and transportation assistance - Free or low-cost transportation Distance: 3.06 miles      In-Person, Phone/Virtual   [address] [place] [address]  Language: English  Hours: Mon - Fri 8:00 AM - 12:00 PM , Mon - Fri 1:00 PM - 4:00 PM  Fees: Free   Phone: (541) 506-6826 Email:  elizabeth@[facility] Website: https://[facility]/social-services-office-[place]/     16  [facility] Distance: 8.62 miles      [address] [place] [address]  Language: English, Cymro, Hmong, Cayman Islander, Setswana  Hours: Mon - Sun 5:00 AM - 10:00 PM  Fees: Self Pay   Phone: (300) 975-8482 Email: [employer]Ke [employer] TechnologycareSiverge Networksmobility@[employer] Website: https://www.[facility] Center[facility]/Providers/Max[facility]_Mobility_LLC/Transportation/1?pos=9     Transportation to medical appointments  17  [facility] Distance: 8.62 miles      [address] [place] [address]  Language: English, Cymro, Hmong, Cayman Islander, Setswana  Hours: Mon - Sun 5:00 AM - 10:00 PM  Fees: Insurance, Self Pay   Phone: (983) 621-7076 Email: [employer]Ke [employer] TechnologycareSiverge Networksmobility@[employer] Website: https://www.minnesotaSolido Design AutomationYPlanSt. [facility]/Providers/Maximal_Care_Mobility_LLC/Transportation/1?pos=9     18  [facility] - Non-Emergency Medical Transportation Distance: 10.37 miles      In-Person   [address] [place] [address]  Language: English  Hours: Mon - Fri 8:00 AM - 4:00 PM Appt. Only  Fees: Self Pay   Phone: (204) 955-5892 Website: http://www.[facility].org/Medical-Services/Emergency-medical-services/Non-emergency-transportation/          Important Numbers & Websites       Emergency Services   911  [facility] Services   311  Poison Control   (718) 461-5510  Suicide Prevention Lifeline   (807) 121-7478 (TALK)  Child Abuse Hotline   (576) 925-1962 (4-A-Child)  Sexual Assault Hotline   (767) 164-2100 (HOPE)  National Runaway Safeline   (681) 950-4552 (RUNAWAY)  All-Options Talkline   (932) 374-4587  Substance Abuse Referral   (992) 282-1342 (HELP)

## 2024-03-04 ENCOUNTER — PATIENT OUTREACH (OUTPATIENT)
Dept: NURSING | Facility: CLINIC | Age: 59
End: 2024-03-04
Payer: COMMERCIAL

## 2024-03-04 ENCOUNTER — TELEPHONE (OUTPATIENT)
Dept: CARE COORDINATION | Facility: CLINIC | Age: 59
End: 2024-03-04

## 2024-03-04 ASSESSMENT — ACTIVITIES OF DAILY LIVING (ADL): DEPENDENT_IADLS:: CLEANING;COOKING;LAUNDRY;SHOPPING;MEAL PREPARATION;INCONTINENCE

## 2024-03-04 NOTE — TELEPHONE ENCOUNTER
Loki Mahmood,     I spoke with patient today. She stated she will need a new wheelchair soon. She said her current one is not repairable. We discussed the need for an Office Visit with you in order to be covered by Medicare. Patient also would like home care RN, PT, OT, and home health aide. I saw you placed an order on 1/18/24 for home care. She had [facility], but they do not have evening appointments, and patient is requesting home care to come in the evening. I am not sure, but I wonder if you would have to put in a newer home care order for another home care agency to accept her. Patient is willing to do a virtual visit with you in order to discuss both of these concerns. Do you have any availability? I did discuss scheduling with another provider, but she said she would prefer to discuss with you. Please advise.     Thanks,     Dave Myhre, RN   CCC RN

## 2024-03-04 NOTE — LETTER
[facility]  Patient Centered Plan of Care  About Me:        Patient Name:  Lyn Rahman,     Here is a copy of the Care Plan with the goals we supporting you with at this time. Please let me know if can help in anyway.     Thanks,     Dave Myhre, RN   CCC RN  [phone]    YOB: 1965  Age:         59 year old   Shannan MRN:    [id number] Telephone Information:  Home Phone [phone]   Mobile [phone]   Home Phone [phone]       Address:  [address] [place], [place] [address] Email address:  [email or web address].[email or web address]      Emergency Contact(s)    Name Relationship Lgl Grd Work Phone Home Phone Mobile Phone   1. JUANITO HUNTER Other   [phone]   2. DONAL HUNTER Other   [phone]           Primary language:  English     needed? No   [employer]:  [phone] op. 1  Other communication barriers:None    Preferred Method of Communication:     Current living arrangement: I live in a private home with family    Mobility Status/ Medical Equipment: Dependent/Assisted by Another        Health Maintenance  Health Maintenance Reviewed: Due/Overdue   Health Maintenance Due   Topic Date Due    URINE DRUG SCREEN  Never done    ADVANCE CARE PLANNING  Never done    DEPRESSION ACTION PLAN  Never done    IPV IMMUNIZATION (2 of 3 - 4-dose series) 06/14/1972    HEPATITIS B IMMUNIZATION (1 of 3 - 19+ 3-dose series) Never done    MEDICARE ANNUAL WELLNESS VISIT  04/05/2001    PAP  04/05/2003    MAMMO SCREENING  10/06/2011    DTAP/TDAP/TD IMMUNIZATION (4 - Td or Tdap) 09/01/2016    ZOSTER IMMUNIZATION (1 of 2) Never done          My Access Plan  Medical Emergency 911   Primary Clinic Line [facility] [phone]   24 Hour Appointment Line [phone] or  [phone]) (toll-free)   24 Hour Nurse Line [phone] (toll-free)   Preferred Urgent Care [facility], [phone]      Preferred [facility]  [phone]     Preferred Pharmacy [id number] [address] AT [facility] [place] [facility]     Behavioral Health Crisis Line The National Suicide Prevention Lifeline at 1-569.706.1516 or Text/Call 988           My Care Team Members  Patient Care Team         Relationship Specialty Notifications Start End    Brayden Mahmood MD PCP - General   3/24/09     Phone: [phone] Fax: [phone]         [address] [facility] [address] [facility] [address]    Brayden Mahmood MD Assigned PCP   6/16/21     Phone: [phone] Fax: [phone]         [address] [facility] [address] [facility] [address]    Brayden Mahmood MD Assigned Pain Medication Provider   1/9/23     Phone: [phone] Fax: [phone]         [address] [facility] [address] [facility] [address]    Julianne Maloney CHW Community Health Worker Primary Care - CC Admissions 2/27/24     Myhre, David J, RN Lead Care Coordinator Primary Care - CC Admissions 2/27/24     Phone: [phone]                     My Care Plans  Self Management and Treatment Plan    Care Plan  Care Plan: Physical Activity       Problem: Patient is inactive       Goal: I would like to work with a home care to assist me with building physical strength.       Start Date: 3/4/2024 Expected End Date: 8/4/2024    This Visit's Progress: 10%    Priority: High    Note:     Barriers: physical deconditioning  Strengths: strong advocate for herself, good family support  Patient expressed understanding of goal: yes  Action steps to achieve this goal:  1. I will attend virtual visit with Dr. Pascual to discuss my need for home care.   2. I understand if Dr. Pascual agrees with my request, a order for home care will be sent to a home care agency.   3. I understand the home care agency will contact me directly to schedule an intake appointment.   4. I will report progress towards this goal at scheduled outreach telephone calls  from my Care Coordination team.                             Care Plan: General       Problem: HP GENERAL PROBLEM       Goal: I would like to have a new electric wheelchair       Start Date: 3/4/2024 Expected End Date: 8/4/2024    This Visit's Progress: 10%    Priority: High    Note:     Barriers: current w/c is in need of replacement  Strengths: strong advocate for herself, strong family support.   Patient expressed understanding of goal: yes  Action steps to achieve this goal:  1. I will attend appointment with Dr. Pascual to discuss getting a new electric wheelchair.   2. I understand Dr. Pascual will place the order for the wheelchair and his assistant will fax to the the [employer] of my choice.   3. I understand I will have complete a seating assessment as part of the process for obtaining the wheelchair.   4. I will report progress towards this goal at schedule outreach telephone calls from my Care Coordination Team.                                Action Plans on File:                       Advance Care Plans/Directives:   Advanced Care Plan/Directives on file:   No    Discussed with patient/caregiver(s):   Advanced Healthcare Directive planning document and instructions sent             My Medical and Care Information  Problem List   Patient Active Problem List   Diagnosis    Ankle Joint Pain    Graves' Disease    Opioid Abuse - Continuous    Paraparesis (Lower Extremities)    Hypothyroidism    Chronic Pain Syndrome    Peripheral Neuropathy    Dry Eye Syndrome    Urinary Tract Infection    Muscle Spasm    Dermatitis    Contracture    Major depression, single episode    Urinary retention      Current Medications and Allergies:  See printed Medication Report.    Care Coordination Start Date: 2/27/2024   Frequency of Care Coordination: monthly, more frequently as needed     Form Last Updated: 04/12/2024

## 2024-03-04 NOTE — TELEPHONE ENCOUNTER
Loki Pascual,     Thanks for letting me know. I spoke with patient and she would be willing to meet with another provider for a virtual visit to discuss getting a new wheelchair and possible new home care orders.     Thanks again,     Maurice

## 2024-03-12 ENCOUNTER — VIRTUAL VISIT (OUTPATIENT)
Dept: FAMILY MEDICINE | Facility: CLINIC | Age: 59
End: 2024-03-12
Payer: COMMERCIAL

## 2024-03-12 DIAGNOSIS — I61.9 CEREBRAL HEMORRHAGE (H): ICD-10-CM

## 2024-03-12 DIAGNOSIS — R60.9 EDEMA, UNSPECIFIED TYPE: ICD-10-CM

## 2024-03-12 DIAGNOSIS — F32.0 CURRENT MILD EPISODE OF MAJOR DEPRESSIVE DISORDER WITHOUT PRIOR EPISODE (H): ICD-10-CM

## 2024-03-12 DIAGNOSIS — E03.9 HYPOTHYROIDISM, UNSPECIFIED TYPE: ICD-10-CM

## 2024-03-12 DIAGNOSIS — G89.4 CHRONIC PAIN SYNDROME: ICD-10-CM

## 2024-03-12 DIAGNOSIS — G82.20 PARAPLEGIA (H): Primary | ICD-10-CM

## 2024-03-12 DIAGNOSIS — R33.9 URINARY RETENTION: ICD-10-CM

## 2024-03-12 PROCEDURE — 99213 OFFICE O/P EST LOW 20 MIN: CPT | Mod: 95 | Performed by: FAMILY MEDICINE

## 2024-03-12 NOTE — LETTER
24          Lyn Russ  : 1965  [address] [facility] [address]  [phone] (home)         ORDER:    Electric Wheelchair Replacement.    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    Face to Face Encounter Date: 2024        Aneudy Pascual MD

## 2024-03-12 NOTE — PROGRESS NOTES
Lacey is a 59 year old who is being evaluated via a billable video visit.      How would you like to obtain your AVS? MyChart  If the video visit is dropped, the invitation should be resent by: Text to cell phone: [phone]  Will anyone else be joining your video visit? No          Lacey was seen today for referral and orders.    Diagnoses and all orders for this visit:    Paraplegia (H)  -     Home Care Referral    Chronic pain syndrome  -     Home Care Referral    Hypothyroidism, unspecified type  -     Home Care Referral    Current mild episode of major depressive disorder without prior episode (H24)  -     Home Care Referral    Urinary retention  -     Home Care Referral    Edema, unspecified type  -     Home Care Referral    Cerebral hemorrhage (H)           Subjective   Lacey is a 59 year old, presenting for the following health issues:  Referral and Orders    HPI     Patient has paraplegia secondary to spontaneous hemorrhage in 1999 at age 34.  She has chronic pain and utilizes fentanyl patch and oxycodone under the direction of her primary care provider.  She reports pain is controlled.  She has a power wheelchair, needs replacement wheelchair at this time due to age of the chair.  There is a issue with the wheels that cannot be rectified at this time necessitating a new wheelchair based on his age and status of the repair.  She has a history of anxiety and depression she reports no mental health concerns on her current course of treatment.  She is on UTI prophylaxis with nitrofurantoin reports no current concern.  Does have a history of urinary retention.  She has hypothyroidism is on replacement.  Reviewed most recent TSH testing noted that she is within normal range with plan to follow-up at the advice of primary care provider.    She would benefit from home care for ongoing complex medication management, physical therapy and Occupational Therapy assessment.      Complete review of systems is obtained.   Other than the specific considerations noted above complete review of systems is negative.        Objective           Vitals:  No vitals were obtained today due to virtual visit.    Physical Exam   GENERAL: alert and no distress  EYES: Eyes grossly normal to inspection.  No discharge or erythema, or obvious scleral/conjunctival abnormalities.  RESP: No audible wheeze, cough, or visible cyanosis.    SKIN: Visible skin clear. No significant rash, abnormal pigmentation or lesions.  NEURO: Cranial nerves grossly intact.  Mentation and speech appropriate for age.  PSYCH: Appropriate affect, tone, and pace of words          Video-Visit Details    Type of service:  Video Visit   Video Start Time: 5:34 PM  Video End Time:5:40 PM    Originating Location (pt. Location): Home    Distant Location (provider location):  On-site  Platform used for Video Visit: Wilver  Signed Electronically by: Aneudy Pascual MD, MD

## 2024-03-18 DIAGNOSIS — G89.4 CHRONIC PAIN SYNDROME: ICD-10-CM

## 2024-03-18 RX ORDER — OXYCODONE AND ACETAMINOPHEN 5; 325 MG/1; MG/1
1-2 TABLET ORAL EVERY 6 HOURS PRN
Qty: 240 TABLET | Refills: 0 | Status: SHIPPED | OUTPATIENT
Start: 2024-03-18 | End: 2024-04-15

## 2024-03-18 RX ORDER — FENTANYL 25 UG/1
1 PATCH TRANSDERMAL
Qty: 10 PATCH | Refills: 0 | Status: SHIPPED | OUTPATIENT
Start: 2024-03-18 | End: 2024-04-15

## 2024-03-18 NOTE — TELEPHONE ENCOUNTER
Refill Request  Medication name: Pending Prescriptions:                       Disp   Refills    fentaNYL (DURAGESIC) 25 mcg/hr 72 hr patch10 pat*0            Sig: Place 1 patch onto the skin every 72 hours APPLY           1 PATCH ON THE SKIN EVERY 3RD DAY    oxyCODONE-acetaminophen (PERCOCET) 5-325 *240 ta*0            Sig: Take 1-2 tablets by mouth every 6 hours as needed           for moderate to severe pain    Requested Pharmacy:  [facility]ORE #[id number] [place], [place] - [address] AT [facility] [address] [facility]

## 2024-04-08 NOTE — TELEPHONE ENCOUNTER
Eamon Leblanc,     I am sorry to bother you, Dr Pascual has an order for Lyn's new wheelchair.     It is in her chart, but I also have a signed order at my desk.    Do you happen to know where I can fax it or mail it?    Thanks, Shashank

## 2024-04-10 ENCOUNTER — TELEPHONE (OUTPATIENT)
Dept: CARE COORDINATION | Facility: CLINIC | Age: 59
End: 2024-04-10
Payer: COMMERCIAL

## 2024-04-10 NOTE — TELEPHONE ENCOUNTER
Loki Pascual,     Could you please put in a new home care order for patient. I was finally able to find a home care agency who can me her requirements, but the order runs out on   Friday. Please advise.     Thank you so much!    Dave Myhre, RN   CCC RN

## 2024-04-11 ENCOUNTER — PATIENT OUTREACH (OUTPATIENT)
Dept: CARE COORDINATION | Facility: CLINIC | Age: 59
End: 2024-04-11
Payer: COMMERCIAL

## 2024-04-11 NOTE — PROGRESS NOTES
Clinic Care Coordination Contact  [facility]    Clinical Data: Care Coordinator Outreach    Outreach Documentation Number of Outreach Attempt   4/11/2024   3:32 PM 1       Left message on patient's voicemail with call back information and requested return call.    Plan: Care Coordinator will try to reach patient again in 3-5 business days.    David Myhre, RN   CCC RN

## 2024-04-12 NOTE — PROGRESS NOTES
"Clinic Care Coordination Contact  Clinic Care Coordination Contact  OUTREACH    Referral Information:  Referral Source: PCP    Primary Diagnosis: Psychosocial    Chief Complaint   Patient presents with    Clinic Care Coordination - Initial        Universal Utilization:   Clinic Utilization  Difficulty keeping appointments:: No  Compliance Concerns: No  No-Show Concerns: No  No PCP office visit in Past Year: No  Utilization      No Show Count (past year)  0             ED Visits  0             Hospital Admissions  0                    Current as of: 4/11/2024 11:26 PM                Clinical Concerns:  Current Medical Concerns:  Patient is a 59 year old woman with a history of ankle joint pain, Grave's disease, opioid abuse - continuous, paraparesis, chronic pain syndrome, peripheral neuropathy, muscle spasm, dermatitis, contracture, major depression disorder, urinary retention.   Patient's primary medical concern is related to physical deconditioning. Patient reported she is bed bound currently. She stated she is unable to transfer to her wheelchair by herself. Patient requesting an order for home care to help her with strength building. She was scheduled with a virtual visit with her an associate of her PCP's to discuss this need. Goal around this concern was established at today's assessment.   Patient stated she is also in need of new electric wheelchair. She reported her current w/c is \"worn out\". Patient will additionally discuss this need with PCP at upcoming scheduled appointment. She is aware she will have to complete a seating assessment in order to obtain the chair. Goal around this concern was additionally established at today's assessment.   Current Behavioral Concerns: Patient reported a history of depression. She is currently prescribed Paroxetine for depression, and reported she this medication gives her some relief from depressive symptoms. Patient declined offer to speak with a therapist. She stated " she has good support from her family. Patient denied any suicidal ideation during today's assessment.      Education Provided to patient: Discussed the importance of taking her medications as directed. Encouraged her to attend upcoming appointments. Encouraged her to contact Care Coordination for any additional needs or concerns.    Pain  Pain (GOAL):: No  Type: Acute on Chronic  Location of chronic pain:: All over at different times. T7 level of spinal cord bleed shoulders legs etc   Radiating: Yes  Location pain radiates to: See previous answer  Progression: Waxing and Waning  Description of pain: Aching, Burning, Nagging, Penetrating, Sharp, Stabbing, Tender, Throbbing  Chronic pain severity:: 5  Limitation of routine activities due to chronic pain:: Yes  Description: Unable to perform most daily activities (chores, hobbies, social activities, driving)  Alleviating Factors: Heat, Pain Medication  Aggravating Factors: Activity, Positioning  Health Maintenance Reviewed: Due/Overdue   Health Maintenance Due   Topic Date Due    URINE DRUG SCREEN  Never done    ADVANCE CARE PLANNING  Never done    DEPRESSION ACTION PLAN  Never done    IPV IMMUNIZATION (2 of 3 - 4-dose series) 06/14/1972    HEPATITIS B IMMUNIZATION (1 of 3 - 19+ 3-dose series) Never done    MEDICARE ANNUAL WELLNESS VISIT  04/05/2001    PAP  04/05/2003    MAMMO SCREENING  10/06/2011    DTAP/TDAP/TD IMMUNIZATION (4 - Td or Tdap) 09/01/2016    ZOSTER IMMUNIZATION (1 of 2) Never done       Medication Management:  Medication review status: Medications reviewed and no changes reported per patient.        Patient manages his medications independently.      Functional Status:  Dependent ADLs:: Bathing, Dressing, Incontinence, Positioning, Transfers, Wheelchair-with assist  Dependent IADLs:: Cleaning, Cooking, Laundry, Shopping, Meal Preparation, Incontinence  Family Currently assisting with needs.   Bed or wheelchair confined:: Yes  Mobility Status:  Dependent/Assisted by Another  Fallen 2 or more times in the past year?: No  Any fall with injury in the past year?: No    Living Situation:  Current living arrangement:: I live in a private home with family  Type of residence:: Private home - no stairs    Lifestyle & Psychosocial Needs:    Social Determinants of Health     Food Insecurity: Low Risk  (3/2/2024)    Food Insecurity     Within the past 12 months, did you worry that your food would run out before you got money to buy more?: No     Within the past 12 months, did the food you bought just not last and you didn t have money to get more?: No   Depression: Not at risk (1/18/2024)    PHQ-2     PHQ-2 Score: 0   Housing Stability: High Risk (3/2/2024)    Housing Stability     Do you have housing? : No     Are you worried about losing your housing?: No   Tobacco Use: Low Risk  (1/18/2024)    Patient History     Smoking Tobacco Use: Never     Smokeless Tobacco Use: Never     Passive Exposure: Not on file   Financial Resource Strain: Low Risk  (3/2/2024)    Financial Resource Strain     Within the past 12 months, have you or your family members you live with been unable to get utilities (heat, electricity) when it was really needed?: No   Alcohol Use: Not At Risk (3/2/2024)    AUDIT-C     Frequency of Alcohol Consumption: Never     Average Number of Drinks: Patient does not drink     Frequency of Binge Drinking: Never   Transportation Needs: High Risk (3/2/2024)    Transportation Needs     Within the past 12 months, has lack of transportation kept you from medical appointments, getting your medicines, non-medical meetings or appointments, work, or from getting things that you need?: Yes   Physical Activity: Inactive (3/2/2024)    Exercise Vital Sign     Days of Exercise per Week: 0 days     Minutes of Exercise per Session: 0 min   Interpersonal Safety: Not on file   Stress: No Stress Concern Present (3/2/2024)    [employer] -  Occupational Stress Questionnaire     Feeling of Stress : Only a little   Social Connections: Socially Isolated (3/2/2024)    Social Connection and Isolation Panel [NHANES]     Frequency of Communication with Friends and Family: More than three times a week     Frequency of Social Gatherings with Friends and Family: Once a week     Attends Jain Services: Never     Active Member of Clubs or Organizations: No     Attends Club or Organization Meetings: Never     Marital Status: Never    Health Literacy: Not on file     Diet:: Regular  Inadequate nutrition (GOAL):: No  Tube Feeding: No  Inadequate activity/exercise (GOAL):: Yes  Significant changes in sleep pattern (GOAL): No  Transportation means:: Family     Jain or spiritual beliefs that impact treatment:: Yes  Mental health DX:: Yes  Mental health DX how managed:: Medication  Mental health management concern (GOAL):: No  Chemical Dependency Status: No Current Concerns  Informal Support system:: Children, Parent        Financial Concerns: Patient denied any financial concerns presently.      Resources and Interventions:  Current Resources:      Community Resources: None  Supplies Currently Used at Home: Incontinence Supplies, Gloves, Wipes  Equipment Currently Used at Home: grab bar, toilet, raised toilet seat, wheelchair, manual  Employment Status: disabled         Advance Care Plan/Directive  Advanced Care Plans/Directives on file:: No  Discussed with patient/caregiver:: Advanced Healthcare Directive planning document and instructions sent    Referrals Placed: None       Care Plan:  Care Plan: Physical Activity       Problem: Patient is inactive       Goal: I would like to work with a home care to assist me with building physical strength.       Start Date: 3/4/2024 Expected End Date: 8/4/2024    This Visit's Progress: 10%    Priority: High    Note:     Barriers: physical deconditioning  Strengths: strong advocate for herself, good family  support  Patient expressed understanding of goal: yes  Action steps to achieve this goal:  1. I will attend virtual visit with Dr. Pascual to discuss my need for home care.   2. I understand if Dr. Pascual agrees with my request, a order for home care will be sent to a home care agency.   3. I understand the home care agency will contact me directly to schedule an intake appointment.   4. I will report progress towards this goal at scheduled outreach telephone calls from my Care Coordination team.                             Care Plan: General       Problem: HP GENERAL PROBLEM       Goal: I would like to have a new electric wheelchair       Start Date: 3/4/2024 Expected End Date: 8/4/2024    This Visit's Progress: 10%    Priority: High    Note:     Barriers: current w/c is in need of replacement  Strengths: strong advocate for herself, strong family support.   Patient expressed understanding of goal: yes  Action steps to achieve this goal:  1. I will attend appointment with Dr. Pascual to discuss getting a new electric wheelchair.   2. I understand Dr. Pascual will place the order for the wheelchair and his assistant will fax to the the home medical company of my choice.   3. I understand I will have complete a seating assessment as part of the process for obtaining the wheelchair.   4. I will report progress towards this goal at schedule outreach telephone calls from my Care Coordination Team.                                Patient/Caregiver understanding: Verbalized understanding of goals and other information discussed at today's assessment.     Outreach Frequency: monthly, more frequently as needed      Plan: CCC RN will continue to monitor, support patient with current goals and will be available to assist as nursing needs arise.

## 2024-04-15 ENCOUNTER — TELEPHONE (OUTPATIENT)
Dept: FAMILY MEDICINE | Facility: CLINIC | Age: 59
End: 2024-04-15
Payer: COMMERCIAL

## 2024-04-15 DIAGNOSIS — G89.4 CHRONIC PAIN SYNDROME: ICD-10-CM

## 2024-04-15 DIAGNOSIS — R60.9 EDEMA, UNSPECIFIED TYPE: ICD-10-CM

## 2024-04-15 RX ORDER — FENTANYL 25 UG/1
1 PATCH TRANSDERMAL
Qty: 10 PATCH | Refills: 0 | Status: SHIPPED | OUTPATIENT
Start: 2024-04-15 | End: 2024-05-13

## 2024-04-15 RX ORDER — FUROSEMIDE 20 MG
TABLET ORAL
Qty: 90 TABLET | Refills: 0 | OUTPATIENT
Start: 2024-04-15

## 2024-04-15 RX ORDER — OXYCODONE AND ACETAMINOPHEN 5; 325 MG/1; MG/1
1-2 TABLET ORAL EVERY 6 HOURS PRN
Qty: 240 TABLET | Refills: 0 | Status: SHIPPED | OUTPATIENT
Start: 2024-04-15 | End: 2024-05-13

## 2024-04-15 NOTE — TELEPHONE ENCOUNTER
Medication last filled:     Last clinic visit: 3/12/2024 virtual visit    Clinic visit frequency required: Q 3 months    Next clinic visit: N/A    Controlled substance agreement on file: No.    Urine Drug Screen on file:  No     Pending Prescriptions:                       Disp   Refills    oxyCODONE-acetaminophen (PERCOCET) 5-325 *240 ta*0            Sig: Take 1-2 tablets by mouth every 6 hours as needed           for moderate to severe pain    fentaNYL (DURAGESIC) 25 mcg/hr 72 hr patch10 pat*0            Sig: Place 1 patch onto the skin every 72 hours APPLY           1 PATCH ON THE SKIN EVERY 3RD DAY

## 2024-04-15 NOTE — TELEPHONE ENCOUNTER
Medication Question or Refill        What medication are you calling about (include dose and sig)?: fentaNYL (DURAGESIC) 25 mcg/hr 72 hr patch, oxyCODONE-acetaminophen (PERCOCET) 5-325 MG tablet     Preferred Pharmacy:   [id number] - [place], [place] - [address] AT [address] [address] [address] [place] [place] [address]  Phone: [phone] Fax: [phone]      Controlled Substance Agreement on file:   CSA -- Patient Level:     [Media Unavailable] Controlled Substance Agreement - Opioid - Scan on 9/24/2019   [Media Unavailable] Controlled Substance Agreement - Opioid - Scan on 12/20/2018   [Media Unavailable] Controlled Substance Agreement - Opioid - Scan on 2/8/2016: CONTROLLED SUBSTANCE       Who prescribed the medication?: priamry    Do you need a refill? Yes    When did you use the medication last? 3/18    Patient offered an appointment? No    Do you have any questions or concerns?  No      Could we send this information to you in [facility] or would you prefer to receive a phone call?:   Patient would prefer a phone call   Okay to leave a detailed message?: Yes at Home number on file [phone] (home)

## 2024-04-16 ENCOUNTER — MEDICAL CORRESPONDENCE (OUTPATIENT)
Dept: HEALTH INFORMATION MANAGEMENT | Facility: CLINIC | Age: 59
End: 2024-04-16

## 2024-04-16 DIAGNOSIS — E03.9 HYPOTHYROIDISM, UNSPECIFIED TYPE: Primary | ICD-10-CM

## 2024-04-16 NOTE — TELEPHONE ENCOUNTER
Loki Mahmood,     Patient is requesting [facility], who she is currently seeing for home care, do her a lab draw to have her thyroid checked. Please place order if you agreed with the this request. They will be coming out this morning. Sorry for the short notice. She left me a message after hours.     [employer].     Phone: [phone]  Fax: [phone]

## 2024-04-17 ENCOUNTER — TELEPHONE (OUTPATIENT)
Dept: CARE COORDINATION | Facility: CLINIC | Age: 59
End: 2024-04-17
Payer: COMMERCIAL

## 2024-04-17 DIAGNOSIS — R60.9 EDEMA, UNSPECIFIED TYPE: ICD-10-CM

## 2024-04-17 RX ORDER — FUROSEMIDE 20 MG
TABLET ORAL
Qty: 90 TABLET | Refills: 0 | OUTPATIENT
Start: 2024-04-17

## 2024-04-17 RX ORDER — FUROSEMIDE 20 MG
20-40 TABLET ORAL DAILY PRN
Qty: 180 TABLET | Refills: 3 | Status: SHIPPED | OUTPATIENT
Start: 2024-04-17

## 2024-04-17 NOTE — TELEPHONE ENCOUNTER
Informed pt of info and pt verbalized understanding. No other questions at this time.    VERNELL FigueroaN, RN  [facility]

## 2024-04-17 NOTE — TELEPHONE ENCOUNTER
Please notify patient.  Yes.  I will send new prescription to pharmacy for 20-40 mg AM dosing as needed for leg swelling.

## 2024-04-17 NOTE — TELEPHONE ENCOUNTER
RN from [employer]. called and requested TSH lab order. Writer forwarded lab order to [employer]. Verbal ok given for home care to draw lab at visit tomorrow.

## 2024-04-17 NOTE — TELEPHONE ENCOUNTER
Pt called and stated [facility] refused to fill and that there is no refills left.  States per discussion with Dr Mahmood she can take 2 sometimes and she has but very rarely, but she does still have some edema.  Can the dose be increased to 2/day?    Please advise

## 2024-04-17 NOTE — TELEPHONE ENCOUNTER
Rosmery from [employer] called and asked if the TSH order could be faxed over to [phone]. Please advise

## 2024-04-18 ENCOUNTER — LAB (OUTPATIENT)
Dept: LAB | Facility: CLINIC | Age: 59
End: 2024-04-18
Payer: COMMERCIAL

## 2024-04-18 ENCOUNTER — APPOINTMENT (OUTPATIENT)
Dept: LAB | Facility: CLINIC | Age: 59
End: 2024-04-18
Payer: COMMERCIAL

## 2024-04-18 DIAGNOSIS — E03.9 HYPOTHYROIDISM, UNSPECIFIED TYPE: ICD-10-CM

## 2024-04-18 PROCEDURE — 84439 ASSAY OF FREE THYROXINE: CPT

## 2024-04-18 PROCEDURE — 84443 ASSAY THYROID STIM HORMONE: CPT

## 2024-04-20 LAB
T4 FREE SERPL-MCNC: 1.57 NG/DL (ref 0.9–1.7)
TSH SERPL DL<=0.005 MIU/L-ACNC: 9.23 UIU/ML (ref 0.3–4.2)

## 2024-04-21 DIAGNOSIS — E03.9 HYPOTHYROIDISM, UNSPECIFIED TYPE: ICD-10-CM

## 2024-04-21 RX ORDER — LEVOTHYROXINE SODIUM 137 UG/1
TABLET ORAL
Qty: 90 TABLET | Refills: 1 | Status: SHIPPED | OUTPATIENT
Start: 2024-04-21

## 2024-04-24 ENCOUNTER — PATIENT OUTREACH (OUTPATIENT)
Dept: CARE COORDINATION | Facility: CLINIC | Age: 59
End: 2024-04-24
Payer: COMMERCIAL

## 2024-04-24 NOTE — PROGRESS NOTES
Clinic Care Coordination Contact  [facility]/Voicemail       Clinical Data: CHW Outreach    Outreach attempted x 1.  Left message on patient's voicemail with call back information and requested return call.    Plan: CHW will make another attempt to reach the patient via phone or MyChart.    CHW outreach in the next 2 weeks.      MONICA Macias  Clinic Care Coordination   [facility]   Phone: [phone]  [email or web address]

## 2024-05-10 ENCOUNTER — MYC MEDICAL ADVICE (OUTPATIENT)
Dept: FAMILY MEDICINE | Facility: CLINIC | Age: 59
End: 2024-05-10
Payer: COMMERCIAL

## 2024-05-10 ENCOUNTER — PATIENT OUTREACH (OUTPATIENT)
Dept: CARE COORDINATION | Facility: CLINIC | Age: 59
End: 2024-05-10
Payer: COMMERCIAL

## 2024-05-10 DIAGNOSIS — G82.20 PARAPLEGIA (H): Primary | ICD-10-CM

## 2024-05-10 NOTE — PROGRESS NOTES
Clinic Care Coordination Contact  [facility]/Voicemail       Clinical Data: CHW Outreach    Outreach attempted x 2.  Left message on patient's voicemail with call back information and requested return call.    Plan: CHW will make another attempt to reach the patient via phone or MyChart.    CHW outreach in the next 2 weeks.      MONICA Macias  Clinic Care Coordination   [facility]   Phone: [phone]  [email or web address]

## 2024-05-13 ENCOUNTER — TELEPHONE (OUTPATIENT)
Dept: FAMILY MEDICINE | Facility: CLINIC | Age: 59
End: 2024-05-13
Payer: COMMERCIAL

## 2024-05-13 DIAGNOSIS — E03.9 HYPOTHYROIDISM, UNSPECIFIED TYPE: Primary | ICD-10-CM

## 2024-05-13 DIAGNOSIS — G89.4 CHRONIC PAIN SYNDROME: ICD-10-CM

## 2024-05-13 RX ORDER — FENTANYL 25 UG/1
1 PATCH TRANSDERMAL
Qty: 10 PATCH | Refills: 0 | Status: SHIPPED | OUTPATIENT
Start: 2024-05-13 | End: 2024-06-10

## 2024-05-13 RX ORDER — OXYCODONE AND ACETAMINOPHEN 5; 325 MG/1; MG/1
1-2 TABLET ORAL EVERY 6 HOURS PRN
Qty: 240 TABLET | Refills: 0 | Status: SHIPPED | OUTPATIENT
Start: 2024-05-13 | End: 2024-06-10

## 2024-05-13 NOTE — TELEPHONE ENCOUNTER
General Call    Contacts         Type Contact Phone/Fax    05/13/2024 09:33 AM CDT Phone (Incoming) Wendy [phone]     [employer]          Reason for Call:  requesting lab order    What are your questions or concerns:  Wendy from [facility] [employer] [facility] called and stated that patient wants to do a lab draw to check her thyroid levels at her visit next week, because her dose was just changed and wants to know how its going. Caller asked if a order could be put in by  and faxed to [phone] with [facility] [employer]. Please advise    Could we send this information to you in [employer] or would you prefer to receive a phone call?:   Patient would prefer a phone call   Okay to leave a detailed message?: Yes at Other phone number: [phone]

## 2024-05-13 NOTE — TELEPHONE ENCOUNTER
Refill Request  Medication name: Pending Prescriptions:                       Disp   Refills    oxyCODONE-acetaminophen (PERCOCET) 5-325 *240 ta*0            Sig: Take 1-2 tablets by mouth every 6 hours as needed           for moderate to severe pain    fentaNYL (DURAGESIC) 25 mcg/hr 72 hr patch10 pat*0            Sig: Place 1 patch onto the skin every 72 hours APPLY           1 PATCH ON THE SKIN EVERY 3RD DAY    Requested Pharmacy:  [id number] [place], [place] - [address] AT [facility] [address] [facility]

## 2024-05-15 ENCOUNTER — PATIENT OUTREACH (OUTPATIENT)
Dept: CARE COORDINATION | Facility: CLINIC | Age: 59
End: 2024-05-15
Payer: COMMERCIAL

## 2024-05-15 NOTE — PROGRESS NOTES
Clinic Care Coordination Contact  [facility]/Voicemail    Clinical Data: Care Coordinator Outreach    Outreach Documentation Number of Outreach Attempt   4/11/2024   3:32 PM 1   5/15/2024   3:43 PM 1       Left message on patient's voicemail with call back information and requested return call.    Plan: Care Coordinator will try to reach patient again in one week.    David Myhre, RN   CCC RN

## 2024-05-21 ENCOUNTER — LAB (OUTPATIENT)
Dept: LAB | Facility: CLINIC | Age: 59
End: 2024-05-21
Payer: COMMERCIAL

## 2024-05-21 ENCOUNTER — TELEPHONE (OUTPATIENT)
Dept: FAMILY MEDICINE | Facility: CLINIC | Age: 59
End: 2024-05-21
Payer: COMMERCIAL

## 2024-05-21 DIAGNOSIS — R60.9 EDEMA, UNSPECIFIED TYPE: Primary | ICD-10-CM

## 2024-05-21 DIAGNOSIS — R60.9 EDEMA, UNSPECIFIED TYPE: ICD-10-CM

## 2024-05-21 DIAGNOSIS — E03.9 HYPOTHYROIDISM, UNSPECIFIED TYPE: ICD-10-CM

## 2024-05-21 PROCEDURE — 80048 BASIC METABOLIC PNL TOTAL CA: CPT

## 2024-05-21 PROCEDURE — 84443 ASSAY THYROID STIM HORMONE: CPT

## 2024-05-21 NOTE — TELEPHONE ENCOUNTER
Home Health Care    Reason for call:  requesting BMP    Orders are needed for this patient.  Skilled Nursing: additional verbal order for BMP lab draw with TSH lab draw. Nurse, Ryan, with [facility], says that the patient is wanting this done because it has been a while since the last one, and how the thyroid affects the whole system.    Please call Ryan at [phone] to address orders asap, as she is currently with patient.      Pt Provider: will    Phone Number Homecare Nurse can be reached at: [phone]

## 2024-05-21 NOTE — TELEPHONE ENCOUNTER
Writer huddled with PCP who placed BMP orders as requested.     Writer called and gave verbal orders to DONNIE Davis with [employer].    YASMIN Lindsey, RN  [facility]

## 2024-05-22 LAB
ANION GAP SERPL CALCULATED.3IONS-SCNC: 18 MMOL/L (ref 7–15)
BUN SERPL-MCNC: 9.3 MG/DL (ref 6–20)
CALCIUM SERPL-MCNC: 9.1 MG/DL (ref 8.6–10)
CHLORIDE SERPL-SCNC: 100 MMOL/L (ref 98–107)
CREAT SERPL-MCNC: 0.5 MG/DL (ref 0.51–0.95)
DEPRECATED HCO3 PLAS-SCNC: 17 MMOL/L (ref 22–29)
EGFRCR SERPLBLD CKD-EPI 2021: >90 ML/MIN/1.73M2
GLUCOSE SERPL-MCNC: 85 MG/DL (ref 70–99)
POTASSIUM SERPL-SCNC: 4.4 MMOL/L (ref 3.4–5.3)
SODIUM SERPL-SCNC: 135 MMOL/L (ref 135–145)
TSH SERPL DL<=0.005 MIU/L-ACNC: 0.49 UIU/ML (ref 0.3–4.2)

## 2024-05-29 ENCOUNTER — PATIENT OUTREACH (OUTPATIENT)
Dept: CARE COORDINATION | Facility: CLINIC | Age: 59
End: 2024-05-29

## 2024-05-29 NOTE — PROGRESS NOTES
Clinic Care Coordination Contact  [facility]/Voicemail       Clinical Data: CHW Outreach    Outreach attempted x 3. Left message on patient's voicemail with call back information and requested return call.    Plan: CHW will send message to Lead CC (RNCC) to determine whether a disenrollment letter should be sent to Patient or if additional attempt should be made to Patient. No further outreach attempts will be made. Should they return my call, I will discuss clinic care coordination per standard work.    MONICA Macias  Clinic Care Coordination   [facility]   Phone: [phone]  [email or web address]

## 2024-06-10 DIAGNOSIS — G89.4 CHRONIC PAIN SYNDROME: ICD-10-CM

## 2024-06-10 RX ORDER — FENTANYL 25 UG/1
1 PATCH TRANSDERMAL
Qty: 10 PATCH | Refills: 0 | Status: SHIPPED | OUTPATIENT
Start: 2024-06-10 | End: 2024-07-03

## 2024-06-10 RX ORDER — OXYCODONE AND ACETAMINOPHEN 5; 325 MG/1; MG/1
1-2 TABLET ORAL EVERY 6 HOURS PRN
Qty: 240 TABLET | Refills: 0 | Status: SHIPPED | OUTPATIENT
Start: 2024-06-10 | End: 2024-07-03

## 2024-06-10 NOTE — TELEPHONE ENCOUNTER
Medication last filled:     Last clinic visit: 03/12/2024- virtual    Clinic visit frequency required: Q 3 months    Next clinic visit: N/A    Controlled substance agreement on file: Yes:  Date 09/24/2019.    Urine Drug Screen on file:  Yes- 7/24/2018      Pending Prescriptions:                       Disp   Refills    oxyCODONE-acetaminophen (PERCOCET) 5-325 *240 ta*0            Sig: Take 1-2 tablets by mouth every 6 hours as needed           for moderate to severe pain    fentaNYL (DURAGESIC) 25 mcg/hr 72 hr patch10 pat*0            Sig: Place 1 patch onto the skin every 72 hours APPLY           1 PATCH ON THE SKIN EVERY 3RD DAY

## 2024-06-10 NOTE — TELEPHONE ENCOUNTER
Medication Question or Refill        What medication are you calling about (include dose and sig)?: oxyCODONE-acetaminophen (PERCOCET) 5-325 MG tablet & fentaNYL (DURAGESIC) 25 mcg/hr 72 hr patch     Preferred Pharmacy:   [facility] #[id number] - [place], [place] - [address] AT [address] [address] [address] [place] [place] [address]  Phone: [phone] Fax: [phone]      Controlled Substance Agreement on file:   CSA -- Patient Level:     [Media Unavailable] Controlled Substance Agreement - Opioid - Scan on 9/24/2019   [Media Unavailable] Controlled Substance Agreement - Opioid - Scan on 12/20/2018   [Media Unavailable] Controlled Substance Agreement - Opioid - Scan on 2/8/2016: CONTROLLED SUBSTANCE       Who prescribed the medication?: Brayden Mahmood    Do you need a refill? Yes    When did you use the medication last? Pt has 3 days left    Patient offered an appointment? No    Do you have any questions or concerns?  No      Could we send this information to you in [facility] or would you prefer to receive a phone call?:   No preference   Okay to leave a detailed message?: Yes at Cell number on file:    Telephone Information:   Mobile [phone]

## 2024-06-15 ENCOUNTER — MEDICAL CORRESPONDENCE (OUTPATIENT)
Dept: HEALTH INFORMATION MANAGEMENT | Facility: CLINIC | Age: 59
End: 2024-06-15

## 2024-06-20 ENCOUNTER — PATIENT OUTREACH (OUTPATIENT)
Dept: CARE COORDINATION | Facility: CLINIC | Age: 59
End: 2024-06-20
Payer: COMMERCIAL

## 2024-06-20 NOTE — Clinical Note
Patient was dis-enrolled from Care Coordination related to being unreachable. She can be referred back to Care Coordination anytime if needs or concerns should arise.

## 2024-06-20 NOTE — LETTER
[facility] CARE COORDINATION  [facility]    June 20, 2024    Lyn Russ  [address] [address]      Dear Lacey,    I have been unsuccessful in reaching you since our last contact. At this time the Care Coordination team will make no further attempts to reach you, however this does not change your ability to continue receiving care from your providers at your primary care clinic. If you need additional support from a care coordinator in the future please contact me at [phone].    All of us at [facility] are invested in your health and are here to assist you in meeting your goals.     Sincerely,    Dave Myhre, RN   CCC RN

## 2024-06-20 NOTE — PROGRESS NOTES
Clinic Care Coordination Contact    Situation: Patient chart reviewed by care coordinator.    Background: CCC Team has been supporting patient with goal around getting home care and getting a new electric wheelchair.     Assessment: Patient currently has home care in place and has had a face to face with her PCP and received order for electric wheelchair. Patient has not been reachable by [facility] Team after multiple attends.     Plan/Recommendations: Patient will be dis-enrolled from Care Coordination related to being unreachable. She can be referred back to Care Coordination at anytime by her PCP if needs or concern should arise.     David Myhre, RN   [facility] RN  [phone]

## 2024-07-03 DIAGNOSIS — G89.4 CHRONIC PAIN SYNDROME: ICD-10-CM

## 2024-07-03 RX ORDER — FENTANYL 25 UG/1
1 PATCH TRANSDERMAL
Qty: 10 PATCH | Refills: 0 | Status: SHIPPED | OUTPATIENT
Start: 2024-07-03 | End: 2024-08-03

## 2024-07-03 RX ORDER — OXYCODONE AND ACETAMINOPHEN 5; 325 MG/1; MG/1
1-2 TABLET ORAL EVERY 6 HOURS PRN
Qty: 240 TABLET | Refills: 0 | Status: SHIPPED | OUTPATIENT
Start: 2024-07-03 | End: 2024-08-03

## 2024-07-03 NOTE — TELEPHONE ENCOUNTER
Medication Question or Refill    Contacts       Contact Date/Time Type Contact Phone/Fax    07/03/2024 03:10 PM CDT Phone (Incoming) Jeb Lacey L (Self) [phone] (M)            What medication are you calling about (include dose and sig)?: oxyCODONE-acetaminophen (PERCOCET) 5-325 MG tablet   fentaNYL (DURAGESIC) 25 mcg/hr 72 hr patch    Preferred Pharmacy:    [id number] [place] [address] [address] [place] [address] [address] [facility] [address]  Phone: [phone] Fax: [phone]      Controlled Substance Agreement on file:   CSA -- Patient Level:     [Media Unavailable] Controlled Substance Agreement - Opioid - Scan on 9/24/2019   [Media Unavailable] Controlled Substance Agreement - Opioid - Scan on 12/20/2018   [Media Unavailable] Controlled Substance Agreement - Opioid - Scan on 2/8/2016: CONTROLLED SUBSTANCE       Who prescribed the medication?: Timoteo    Do you need a refill? Yes    When did you use the medication last? N/A    Patient offered an appointment? No    Do you have any questions or concerns?  No      Could we send this information to you in [facility] or would you prefer to receive a phone call?:   Patient would prefer a phone call   Okay to leave a detailed message?: Yes at Home number on file [phone] (home)

## 2024-07-14 DIAGNOSIS — E03.9 HYPOTHYROIDISM, UNSPECIFIED TYPE: ICD-10-CM

## 2024-07-15 RX ORDER — LEVOTHYROXINE SODIUM 125 UG/1
TABLET ORAL
Qty: 30 TABLET | Refills: 3 | OUTPATIENT
Start: 2024-07-15

## 2024-07-19 ENCOUNTER — E-VISIT (OUTPATIENT)
Dept: FAMILY MEDICINE | Facility: CLINIC | Age: 59
End: 2024-07-19
Payer: COMMERCIAL

## 2024-07-19 DIAGNOSIS — J01.90 ACUTE BACTERIAL SINUSITIS: Primary | ICD-10-CM

## 2024-07-19 DIAGNOSIS — B96.89 ACUTE BACTERIAL SINUSITIS: Primary | ICD-10-CM

## 2024-07-19 DIAGNOSIS — G89.4 CHRONIC PAIN SYNDROME: ICD-10-CM

## 2024-07-19 PROCEDURE — 99421 OL DIG E/M SVC 5-10 MIN: CPT | Performed by: FAMILY MEDICINE

## 2024-07-19 RX ORDER — DOXYCYCLINE HYCLATE 100 MG
100 TABLET ORAL 2 TIMES DAILY
Qty: 14 TABLET | Refills: 0 | Status: SHIPPED | OUTPATIENT
Start: 2024-07-19 | End: 2024-07-26

## 2024-07-19 NOTE — PATIENT INSTRUCTIONS

## 2024-07-25 DIAGNOSIS — E03.9 HYPOTHYROIDISM, UNSPECIFIED TYPE: ICD-10-CM

## 2024-07-25 RX ORDER — LEVOTHYROXINE SODIUM 137 UG/1
TABLET ORAL
Qty: 90 TABLET | Refills: 1 | OUTPATIENT
Start: 2024-07-25

## 2024-08-02 ENCOUNTER — TELEPHONE (OUTPATIENT)
Dept: FAMILY MEDICINE | Facility: CLINIC | Age: 59
End: 2024-08-02
Payer: COMMERCIAL

## 2024-08-02 DIAGNOSIS — G89.4 CHRONIC PAIN SYNDROME: ICD-10-CM

## 2024-08-02 NOTE — TELEPHONE ENCOUNTER
oxyCODONE-acetaminophen (PERCOCET) 5-325 MG tablet and fentaNYL (DURAGESIC) 25 mcg/hr 72 hr patch  please call if you have any questions.  OK to LM on     Patient uses [facility] on [address] in [place]

## 2024-08-03 RX ORDER — FENTANYL 25 UG/1
1 PATCH TRANSDERMAL
Qty: 10 PATCH | Refills: 0 | Status: SHIPPED | OUTPATIENT
Start: 2024-08-03 | End: 2024-08-29

## 2024-08-03 RX ORDER — OXYCODONE AND ACETAMINOPHEN 5; 325 MG/1; MG/1
1-2 TABLET ORAL EVERY 6 HOURS PRN
Qty: 240 TABLET | Refills: 0 | Status: SHIPPED | OUTPATIENT
Start: 2024-08-03 | End: 2024-08-29

## 2024-08-13 ENCOUNTER — TELEPHONE (OUTPATIENT)
Dept: FAMILY MEDICINE | Facility: CLINIC | Age: 59
End: 2024-08-13
Payer: COMMERCIAL

## 2024-08-13 DIAGNOSIS — G89.4 CHRONIC PAIN SYNDROME: Primary | ICD-10-CM

## 2024-08-13 ASSESSMENT — PATIENT HEALTH QUESTIONNAIRE - PHQ9: SUM OF ALL RESPONSES TO PHQ QUESTIONS 1-9: 3

## 2024-08-13 NOTE — TELEPHONE ENCOUNTER
"Patient is currently due to have a urine toxin screen, controlled substance agreement, and a PHQ-9 completed.    See urine drug screen recommendations per policy below:  \"Thank you for choosing [facility] as your primary care providers, our records indicate that you are out of compliance surrounding your controlled substance agreement, we are asking you to please come into the clinic within 48 hours to leave a urine sample. If you chose not to leave a urine sample this could jeopardize you from getting any further refills. Your care important to us and this is a requirement of the CDC guidelines when prescribing controlled substances. \"    Writer called the patient and relayed the above information to the patient, who verbalized understanding    Patient is scheduled for an appointment, with the PCP, on 10/31/24, Thursday, at 12:50 pm, for a medication check and to sign the CSA in clinic.    Patient has physical inability to come into the clinic at this time due to paraparesis, so she has had a nurse come to her home for skilled nursing caret through [facility] [employer].     Patient had a re-certification done today by  DONNIE Bartlett, at [facility] [employer]. ([phone])-currently waiting on request for orders from [facility] care at this time.    Patient would like to have the home health RN come out to the home to perform the urine toxin screen, if possible, and have the urine brought into the [facility] for testing.    Writer will pend an order for the urine toxin screen to be reviewed and signed by the PCP, if appropriate.    Writer also completed the PHQ-9 over the phone with the patient today, 8/13/24, and will send the results to the PCP for review..    If PCP signs urine drug screen order, please call DONNIE Dean, at [email or web address] to set up a time for the home health RN to bring the urine sample into the [facility].    Please call the patient when above concerns have " been met for an update on urine toxin screen completion.    Radha Samuels RN, BSN  [facility]

## 2024-08-14 ENCOUNTER — MEDICAL CORRESPONDENCE (OUTPATIENT)
Dept: HEALTH INFORMATION MANAGEMENT | Facility: CLINIC | Age: 59
End: 2024-08-14

## 2024-08-14 NOTE — TELEPHONE ENCOUNTER
"PCP signed urine drug screen order for patient and sent a message back to the writer via secure chat regarding urine drug screen:    \"I placed an order for urine drug screen as well as home care order in order to be completed at home due to patient difficulty with transfers due to known paraplegia.\"    Writer called the patient and left a detailed message regarding the above PCP's response to home care RN getting the patient's urine sample for drug testing.    Writer also called DONNIE Bartlett case manager, at [facility], to assist the patient with setting up a time for the home care RN to collect the urine sample, from the patient, for drug testing.    When DONNIE Bartlett case manager calls back, please route her to the RN queue to discuss specifics of collecting a urine sample from the patient and scheduling a time to bring the urine into the clinic for drug testing.    Radha Samuels RN, BSN  [facility]    "

## 2024-08-14 NOTE — TELEPHONE ENCOUNTER
Writer sent the PCP a secure chat regarding the urine drug screeen request from 8/13/24.    Radha Samuels RN, BSN  [facility]

## 2024-08-16 LAB — CREAT UR-MCNC: 9 MG/DL

## 2024-08-16 PROCEDURE — G0481 DRUG TEST DEF 8-14 CLASSES: HCPCS | Performed by: FAMILY MEDICINE

## 2024-08-16 NOTE — TELEPHONE ENCOUNTER
Patient called back. Notified patient that the urine drug screen and home care order were placed so that the urine drug screen could be completed at home. Informed patient that it appears we are trying to coordinate this with Home Care. Patient verbalized understanding.     She states that her daughter could also assist in her in collecting a urine sample and bringing it in. She states then it does not have to be coordinated with Home Care and it can be completed as soon as possible, as she is aware that it was to be done within 48 hours.     Informed her I would send a message to her team to further coordinate urine drug screen.       DONNIE Hinojosa   [facility]

## 2024-08-16 NOTE — TELEPHONE ENCOUNTER
See message from 8/14/24 regarding a urine sample order.    Writer called the patient and left a message to return call.    When the patient calls back, please route her to the RN queue to discuss urine drug screen order.    Radha Samuels RN, BSN  [facility]

## 2024-08-19 ENCOUNTER — MEDICAL CORRESPONDENCE (OUTPATIENT)
Dept: HEALTH INFORMATION MANAGEMENT | Facility: CLINIC | Age: 59
End: 2024-08-19
Payer: COMMERCIAL

## 2024-08-20 NOTE — TELEPHONE ENCOUNTER
Patient called back to the clinic to report that she had her daughter bring in a urine sample on Friday, 8/16/24, and the urine sample is in process for drug screen testing right now.    Denies other questions or concerns at this time.    Radha Samuels RN, BSN  [facility]

## 2024-08-20 NOTE — TELEPHONE ENCOUNTER
Writer sent the PCP a secure chat, asking if it is appropriate for the patient's daughter to bring in a urine sample for a drug screen as patient has mobility concerns.    Radha Samuels RN, BSN  [facility]

## 2024-08-20 NOTE — TELEPHONE ENCOUNTER
PCP sent a message to the writer via secure chat, stating that it was appropriate for the patient's daughter to bring in a urine sample from the patient for drug testing as the patient has paralysis and limited mobility.    Writer called the patient and left a detailed message, relaying the above message, and asking that the patient call back to the clinic to schedule a lab-only appointment for the urine sample to be tested.    When the patient calls back, please schedule her a lab-only appointment for a urine drug screen to be completed as soon as possible.    Please route to the RN queue for any questions or concerns.    Radha Samuels, RN, BSN  [facility]

## 2024-08-22 LAB
FENTANYL UR CFM-MCNC: 10 NG/ML
FENTANYL/CREAT UR: 111 NG/MG {CREAT}
NORFENTANYL UR CFM-MCNC: 36 NG/ML
NORFENTANYL/CREAT UR: 400 NG/MG {CREAT}
OXYCODONE UR CFM-MCNC: 1220 NG/ML
OXYCODONE/CREAT UR: ABNORMAL NG/MG {CREAT}
OXYMORPHONE UR CFM-MCNC: 1200 NG/ML
OXYMORPHONE/CREAT UR: ABNORMAL NG/MG {CREAT}

## 2024-08-29 DIAGNOSIS — G89.4 CHRONIC PAIN SYNDROME: ICD-10-CM

## 2024-08-29 RX ORDER — FENTANYL 25 UG/1
1 PATCH TRANSDERMAL
Qty: 10 PATCH | Refills: 0 | Status: SHIPPED | OUTPATIENT
Start: 2024-08-29 | End: 2024-09-26

## 2024-08-29 RX ORDER — OXYCODONE AND ACETAMINOPHEN 5; 325 MG/1; MG/1
1-2 TABLET ORAL EVERY 6 HOURS PRN
Qty: 240 TABLET | Refills: 0 | Status: SHIPPED | OUTPATIENT
Start: 2024-08-29 | End: 2024-09-26

## 2024-08-29 NOTE — TELEPHONE ENCOUNTER
Medication Question or Refill        What medication are you calling about (include dose and sig)?:   oxyCODONE-acetaminophen (PERCOCET) 5-325 MG tablet   fentaNYL (DURAGESIC) 25 mcg/hr 72 hr patch     Preferred Pharmacy:  [id number] - [place] - [address] [place] [address] [address] [place] [address]  Phone: [phone] Fax: [phone]      Controlled Substance Agreement on file:   CSA -- Patient Level:     [Media Unavailable] Controlled Substance Agreement - Opioid - Scan on 9/24/2019   [Media Unavailable] Controlled Substance Agreement - Opioid - Scan on 12/20/2018   [Media Unavailable] Controlled Substance Agreement - Opioid - Scan on 2/8/2016: CONTROLLED SUBSTANCE     Do you need a refill? Yes

## 2024-09-26 DIAGNOSIS — G89.4 CHRONIC PAIN SYNDROME: ICD-10-CM

## 2024-09-26 RX ORDER — FENTANYL 25 UG/1
1 PATCH TRANSDERMAL
Qty: 10 PATCH | Refills: 0 | Status: SHIPPED | OUTPATIENT
Start: 2024-09-26

## 2024-09-26 RX ORDER — OXYCODONE AND ACETAMINOPHEN 5; 325 MG/1; MG/1
1-2 TABLET ORAL EVERY 6 HOURS PRN
Qty: 240 TABLET | Refills: 0 | Status: SHIPPED | OUTPATIENT
Start: 2024-09-26

## 2024-09-26 NOTE — TELEPHONE ENCOUNTER
Medication Question or Refill    Contacts       Contact Date/Time Type Contact Phone/Fax    09/26/2024 08:55 AM CDT Phone (Incoming) Jeb Lacey ROBINSON (Self) [phone] (M)            What medication are you calling about (include dose and sig)?:   oxyCODONE-acetaminophen (PERCOCET) 5-325 MG tablet   fentaNYL (DURAGESIC) 25 mcg/hr 72 hr patch     Preferred Pharmacy:  [facility] [id number] [place] [address] [address] [facility] [address] [place] [address]  Phone: [phone] Fax: [phone]      Controlled Substance Agreement on file:   CSA -- Patient Level:     [Media Unavailable] Controlled Substance Agreement - Opioid - Scan on 9/24/2019   [Media Unavailable] Controlled Substance Agreement - Opioid - Scan on 12/20/2018   [Media Unavailable] Controlled Substance Agreement - Opioid - Scan on 2/8/2016: CONTROLLED SUBSTANCE       Do you need a refill? Yes

## 2024-10-13 ENCOUNTER — MEDICAL CORRESPONDENCE (OUTPATIENT)
Dept: HEALTH INFORMATION MANAGEMENT | Facility: CLINIC | Age: 59
End: 2024-10-13

## 2024-10-22 ENCOUNTER — MYC REFILL (OUTPATIENT)
Dept: FAMILY MEDICINE | Facility: CLINIC | Age: 59
End: 2024-10-22
Payer: COMMERCIAL

## 2024-10-22 DIAGNOSIS — F32.0 CURRENT MILD EPISODE OF MAJOR DEPRESSIVE DISORDER WITHOUT PRIOR EPISODE (H): ICD-10-CM

## 2024-10-22 DIAGNOSIS — G89.4 CHRONIC PAIN SYNDROME: ICD-10-CM

## 2024-10-22 DIAGNOSIS — E03.9 HYPOTHYROIDISM, UNSPECIFIED TYPE: ICD-10-CM

## 2024-10-23 DIAGNOSIS — F32.0 CURRENT MILD EPISODE OF MAJOR DEPRESSIVE DISORDER WITHOUT PRIOR EPISODE (H): ICD-10-CM

## 2024-10-23 DIAGNOSIS — G89.4 CHRONIC PAIN SYNDROME: ICD-10-CM

## 2024-10-23 RX ORDER — LEVOTHYROXINE SODIUM 137 UG/1
TABLET ORAL
Qty: 90 TABLET | Refills: 1 | OUTPATIENT
Start: 2024-10-23

## 2024-10-23 RX ORDER — LEVOTHYROXINE SODIUM 137 UG/1
TABLET ORAL
Qty: 90 TABLET | Refills: 1 | Status: SHIPPED | OUTPATIENT
Start: 2024-10-23

## 2024-10-23 RX ORDER — BACLOFEN 20 MG/1
TABLET ORAL
Qty: 180 TABLET | Refills: 3 | Status: CANCELLED | OUTPATIENT
Start: 2024-10-23

## 2024-10-23 RX ORDER — FENTANYL 25 UG/1
1 PATCH TRANSDERMAL
Qty: 10 PATCH | Refills: 0 | OUTPATIENT
Start: 2024-10-23

## 2024-10-23 RX ORDER — PAROXETINE 20 MG/1
20 TABLET, FILM COATED ORAL DAILY
Qty: 90 TABLET | Refills: 3 | Status: CANCELLED | OUTPATIENT
Start: 2024-10-23

## 2024-10-23 RX ORDER — PAROXETINE 20 MG/1
20 TABLET, FILM COATED ORAL DAILY
Qty: 90 TABLET | Refills: 3 | OUTPATIENT
Start: 2024-10-23

## 2024-10-23 RX ORDER — OXYCODONE AND ACETAMINOPHEN 5; 325 MG/1; MG/1
1-2 TABLET ORAL EVERY 6 HOURS PRN
Qty: 240 TABLET | Refills: 0 | OUTPATIENT
Start: 2024-10-23

## 2024-10-23 RX ORDER — BACLOFEN 20 MG/1
TABLET ORAL
Qty: 180 TABLET | Refills: 3 | OUTPATIENT
Start: 2024-10-23

## 2024-10-23 RX ORDER — BACLOFEN 20 MG/1
TABLET ORAL
Qty: 180 TABLET | Refills: 3 | Status: SHIPPED | OUTPATIENT
Start: 2024-10-23

## 2024-10-23 NOTE — TELEPHONE ENCOUNTER
Medication Question or Refill    Contacts       Contact Date/Time Type Contact Phone/Fax    10/23/2024 12:42 PM CDT Phone (Incoming) Lacey Russ (Self) [phone] (M)            What medication are you calling about (include dose and sig)?: oxyCODONE-acetaminophen (PERCOCET) 5-325 MG tablet   PARoxetine (PAXIL) 20 MG tablet  fentaNYL (DURAGESIC) 25 mcg/hr 72 hr patch   baclofen (LIORESAL) 20 MG tablet    Preferred Pharmacy:  [facility] #[id number] [place] [address] [address] [place] [address] [address]  [facility] [address]  Phone: [phone] Fax: [phone]      Controlled Substance Agreement on file:   CSA -- Patient Level:     [Media Unavailable] Controlled Substance Agreement - Opioid - Scan on 9/24/2019   [Media Unavailable] Controlled Substance Agreement - Opioid - Scan on 12/20/2018   [Media Unavailable] Controlled Substance Agreement - Opioid - Scan on 2/8/2016: CONTROLLED SUBSTANCE       Who prescribed the medication?: Timoteo    Do you need a refill? Yes    When did you use the medication last? N/A    Patient offered an appointment? No    Do you have any questions or concerns?  Yes: Patient stated she doesn't have refills or pills on some of her medication. Please advise      Could we send this information to you in [facility] or would you prefer to receive a phone call?:   Patient would prefer a phone call   Okay to leave a detailed message?: Yes at Home number on file [phone] (home)

## 2024-10-23 NOTE — TELEPHONE ENCOUNTER
Mhain Awan RN called Lacey on 10/23 and left a message to return call to clinic. If patient calls back, please route to [facility].     TC if patient returns call please route to RN to discuss refills.     Patient requesting:  Baclofen: was just refilled today from another request, no longer needed.     Fentanyl: Is patient out of the patches already and needing refills?    Percocet: How many has patient been taking in a day, is she taking 2 tabs every 6 hours?    Paxil: Patient should have remaining refills at the pharmacy. 1 years worth of refills sent on 1/23/24.     Mahin Awan RN  [facility]

## 2024-10-24 ENCOUNTER — MYC MEDICAL ADVICE (OUTPATIENT)
Dept: FAMILY MEDICINE | Facility: CLINIC | Age: 59
End: 2024-10-24
Payer: COMMERCIAL

## 2024-10-24 DIAGNOSIS — F32.0 CURRENT MILD EPISODE OF MAJOR DEPRESSIVE DISORDER WITHOUT PRIOR EPISODE (H): ICD-10-CM

## 2024-10-24 RX ORDER — PAROXETINE 20 MG/1
20 TABLET, FILM COATED ORAL DAILY
Qty: 90 TABLET | Refills: 3 | OUTPATIENT
Start: 2024-10-24

## 2024-10-24 RX ORDER — OXYCODONE AND ACETAMINOPHEN 5; 325 MG/1; MG/1
1-2 TABLET ORAL EVERY 6 HOURS PRN
Qty: 240 TABLET | Refills: 0 | Status: SHIPPED | OUTPATIENT
Start: 2024-10-24

## 2024-10-24 RX ORDER — FENTANYL 25 UG/1
1 PATCH TRANSDERMAL
Qty: 10 PATCH | Refills: 0 | Status: SHIPPED | OUTPATIENT
Start: 2024-10-24

## 2024-10-24 NOTE — TELEPHONE ENCOUNTER
Patient called into the clinic, on 10/24/24, to request refills of her Percocet and her fentanyl patches.    Stated that she has 1 fentanyl patch left, to apply tomorrow, 110/25/24, which lasts for 72 hours, through to Sunday, 10/27/24.    Patient also reports that she is taking 2 Percocet every 4 to 6 hours and patient stated she should have enough pills to last through Sunday, 10/27/24.    Would like refills for the above medications sent to the [facility] in [place], [place] at [address]     Denies other questions or concerns at this time.     Radha Samuels, RN, BSN  [facility]

## 2024-10-24 NOTE — TELEPHONE ENCOUNTER
Patient sent in Mychart stating she is aware of the baclofen and paxil but is out of the oxycodone and fentanyl and in need of refills.     Mahin Awan RN  [facility]

## 2024-10-26 DIAGNOSIS — G82.20 PARAPLEGIA (H): Primary | ICD-10-CM

## 2024-10-26 DIAGNOSIS — G89.4 CHRONIC PAIN SYNDROME: ICD-10-CM

## 2024-10-26 DIAGNOSIS — Z23 ENCOUNTER FOR IMMUNIZATION: ICD-10-CM

## 2024-10-30 ASSESSMENT — ANXIETY QUESTIONNAIRES
5. BEING SO RESTLESS THAT IT IS HARD TO SIT STILL: NOT AT ALL
GAD7 TOTAL SCORE: 0
GAD7 TOTAL SCORE: 0
4. TROUBLE RELAXING: NOT AT ALL
7. FEELING AFRAID AS IF SOMETHING AWFUL MIGHT HAPPEN: NOT AT ALL
GAD7 TOTAL SCORE: 0
7. FEELING AFRAID AS IF SOMETHING AWFUL MIGHT HAPPEN: NOT AT ALL
6. BECOMING EASILY ANNOYED OR IRRITABLE: NOT AT ALL
2. NOT BEING ABLE TO STOP OR CONTROL WORRYING: NOT AT ALL
1. FEELING NERVOUS, ANXIOUS, OR ON EDGE: NOT AT ALL
3. WORRYING TOO MUCH ABOUT DIFFERENT THINGS: NOT AT ALL

## 2024-10-31 ENCOUNTER — VIRTUAL VISIT (OUTPATIENT)
Dept: FAMILY MEDICINE | Facility: CLINIC | Age: 59
End: 2024-10-31
Payer: COMMERCIAL

## 2024-10-31 DIAGNOSIS — Z23 NEED FOR TDAP VACCINATION: ICD-10-CM

## 2024-10-31 DIAGNOSIS — G82.20 PARAPLEGIA (H): Primary | ICD-10-CM

## 2024-10-31 DIAGNOSIS — G89.4 CHRONIC PAIN SYNDROME: ICD-10-CM

## 2024-10-31 DIAGNOSIS — F32.0 CURRENT MILD EPISODE OF MAJOR DEPRESSIVE DISORDER WITHOUT PRIOR EPISODE (H): ICD-10-CM

## 2024-10-31 PROCEDURE — 99214 OFFICE O/P EST MOD 30 MIN: CPT | Mod: 95 | Performed by: FAMILY MEDICINE

## 2024-10-31 NOTE — PROGRESS NOTES
Lacey is a 59 year old who is being evaluated via a billable video visit.    How would you like to obtain your AVS? MyChart  If the video visit is dropped, the invitation should be resent by: Text to cell phone: [phone]  Will anyone else be joining your video visit? No      Paraparesis (Lower Extremities)  Paraplegia noted.  Awaiting PT evaluation tomorrow..  Requested leg braces through [facility].    Chronic pain syndrome  Chronic pain syndrome stable.  PDMP website reviewed.  Urine drug screen up-to-date.  CSA reviewed and will be signed by patient.  PHQ-9 questionnaire 2 out of 27 and MEENA-7 questionnaire 0 out of 21.  Anticipate follow-up in office in 6 months.    Current mild episode of major depressive disorder without prior episode (H)  Underlying depression stable with PHQ-9 questionnaire 2 out of 27 and continues benefits of paroxetine 20 mg daily.    Need for Tdap vaccination  Patient will receive flu shot and COVID shot at upcoming home care visit.  Adacel booster to be provided through pharmacy.  - Tdap, tetanus-diptheria-acell pertussis, (BOOSTRIX) 5-2.5-18.5 LF-MCG/0.5 MUMTAZ injection  Dispense: 0.5 mL; Refill: 0     The longitudinal plan of care for the diagnosis(es)/condition(s) as documented were addressed during this visit. Due to the added complexity in care, I will continue to support Lacey in the subsequent management and with ongoing continuity of care.           Subjective   Lacey is a 59 year old, presenting for the following health issues:  Recheck Medication (Pt is not fasting)        10/31/2024    11:12 AM   Additional Questions   Roomed by      Video Start Time: 12:55 PM    History of Present Illness       Reason for visit:  Medication update    She eats 0-1 servings of fruits and vegetables daily.She consumes 1 sweetened beverage(s) daily.She exercises with enough effort to increase her heart rate 9 or less minutes per day.  She exercises with enough effort to increase her heart rate  "3 or less days per week.   She is taking medications regularly.       Patient seen for virtual visit today with paraplegia historically.  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 Continues utilization of fentanyl patch 25 mcg every 72 hours as well as use of oxycodone acetaminophen 5/325 with continued use of 2 tablets 4 times daily on scheduled basis. Doing well with this and denies concerns currently for opioid-induced constipation etc. Mood has remained well controlled with paroxetine 40 mg daily with history of depression and anxiety. UTI prophylaxis with nitrofurantoin 50 mg daily without current concerns for recurrent UTI.  Cologuard testing negative on 2023.  Baclofen utilized.  [facility] website reviewed and appropriate.  Levothyroxine 137 mcg daily for thyroid replacement and does have upcoming home care visit in order to have repeat labs including base metabolic panel and TSH.  Oxybutynin 5 mg using 2 tablets 3 times daily.  Denies fevers or chills.  Comprehensive review of systems as above otherwise all negative.      \"Claire\"   Single   1 son - Jed   1 daughter - Breyton (\"Brey\")   1 granddaughter - Dre   Mom - ( 4/22/15 per phone message) Jeff (she is an RN) - h/o R.A.   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 Dasilva at [facility]   2-12-13 PA approved for lyrica 75mg indefinitely         Review of Systems  Constitutional, HEENT, cardiovascular, pulmonary, GI, , musculoskeletal, neuro, skin, endocrine and psych systems are negative, except as otherwise noted.      Objective    Vitals - Patient Reported  Systolic (Patient Reported): 103  Diastolic (Patient Reported): 64  SpO2 (Patient Reported): 97  Temperature (Patient Reported): 98  F (36.7  C)  Pulse (Patient Reported): 69  Pain Score: Mild Pain (3)  Pain Loc: Other - see comment        Physical Exam   GENERAL: alert and no distress  EYES: Eyes grossly normal to inspection.  No discharge or erythema, or obvious scleral/conjunctival abnormalities.  RESP: No audible wheeze, cough, or visible cyanosis.    SKIN: Visible skin clear. No significant rash, abnormal pigmentation or lesions.  NEURO: Cranial nerves grossly intact.  Mentation and speech appropriate for age.  PSYCH: Appropriate affect, tone, and pace of words          Video-Visit Details    Type of service:  Video Visit   Video End Time:1:10 PM  Originating Location (pt. Location): Home    Distant Location (provider location):  On-site  Platform used for Video Visit: Wilver  Signed Electronically by: Brayden Mahmood MD

## 2024-11-20 DIAGNOSIS — G89.4 CHRONIC PAIN SYNDROME: ICD-10-CM

## 2024-11-20 RX ORDER — OXYCODONE AND ACETAMINOPHEN 5; 325 MG/1; MG/1
1-2 TABLET ORAL EVERY 6 HOURS PRN
Qty: 240 TABLET | Refills: 0 | Status: SHIPPED | OUTPATIENT
Start: 2024-11-20

## 2024-11-20 RX ORDER — FENTANYL 25 UG/1
1 PATCH TRANSDERMAL
Qty: 10 PATCH | Refills: 0 | Status: SHIPPED | OUTPATIENT
Start: 2024-11-20

## 2024-11-20 NOTE — TELEPHONE ENCOUNTER
Medication Question or Refill    What medication are you calling about (include dose and sig)?:   oxyCODONE-acetaminophen (PERCOCET) 5-325 MG tablet   fentaNYL (DURAGESIC) 25 mcg/hr 72 hr patch       Preferred Pharmacy:  [facility] #[id number] - [place] - [address] AT [place] [address] [address] [place] [address]  Phone: [phone] Fax: [phone]      Controlled Substance Agreement on file:   CSA -- Patient Level:     [Media Unavailable] Controlled Substance Agreement - Opioid - Scan on 9/24/2019   [Media Unavailable] Controlled Substance Agreement - Opioid - Scan on 12/20/2018   [Media Unavailable] Controlled Substance Agreement - Opioid - Scan on 2/8/2016: CONTROLLED SUBSTANCE       Do you need a refill? Yes

## 2024-11-23 DIAGNOSIS — Z29.89 NEED FOR PROPHYLAXIS AGAINST URINARY TRACT INFECTION: ICD-10-CM

## 2024-11-24 RX ORDER — NITROFURANTOIN MACROCRYSTALS 50 MG/1
CAPSULE ORAL
Qty: 90 CAPSULE | Refills: 3 | Status: SHIPPED | OUTPATIENT
Start: 2024-11-24

## 2024-12-06 ENCOUNTER — TELEPHONE (OUTPATIENT)
Dept: FAMILY MEDICINE | Facility: CLINIC | Age: 59
End: 2024-12-06

## 2024-12-06 NOTE — TELEPHONE ENCOUNTER
Forms/Letter Request    Type of form/letter: OTHER: [facility] [facility]        Do we have the form/letter: Yes: On the provider desk to be completed.    Who is the form from? [facility] standard written order  (if other please explain)    Where did/will the form come from? form was faxed in    When is form/letter needed by: When it is completed by the provider .    How would you like the form/letter returned: Fax : [phone]    Patient Notified form requests are processed in 5-7 business days:Yes

## 2024-12-07 ENCOUNTER — HEALTH MAINTENANCE LETTER (OUTPATIENT)
Age: 59
End: 2024-12-07

## 2024-12-12 ENCOUNTER — MEDICAL CORRESPONDENCE (OUTPATIENT)
Dept: HEALTH INFORMATION MANAGEMENT | Facility: CLINIC | Age: 59
End: 2024-12-12

## 2024-12-17 DIAGNOSIS — G89.4 CHRONIC PAIN SYNDROME: ICD-10-CM

## 2024-12-17 RX ORDER — OXYCODONE AND ACETAMINOPHEN 5; 325 MG/1; MG/1
1-2 TABLET ORAL EVERY 6 HOURS PRN
Qty: 240 TABLET | Refills: 0 | Status: SHIPPED | OUTPATIENT
Start: 2024-12-17

## 2024-12-17 RX ORDER — FENTANYL 25 UG/1
1 PATCH TRANSDERMAL
Qty: 10 PATCH | Refills: 0 | Status: SHIPPED | OUTPATIENT
Start: 2024-12-17

## 2024-12-17 NOTE — TELEPHONE ENCOUNTER
Medication Question or Refill    Contacts       Contact Date/Time Type Contact Phone/Fax    12/17/2024 09:48 AM CST Phone (Incoming) Lacey Russ (Self) [phone] (M)            What medication are you calling about (include dose and sig)?: oxycodone acetaminophen 5-325mg, fentanyl 25 mcg/hr patch    Preferred Pharmacy:   [facility] #[id number] [place] [address] [address] AT [place] [address] [address] [place] [address]  Phone: [phone] Fax: [phone]      Controlled Substance Agreement on file:   CSA -- Patient Level:     [Media Unavailable] Controlled Substance Agreement - Opioid - Scan on 9/24/2019   [Media Unavailable] Controlled Substance Agreement - Opioid - Scan on 12/20/2018   [Media Unavailable] Controlled Substance Agreement - Opioid - Scan on 2/8/2016: CONTROLLED SUBSTANCE       Who prescribed the medication?: Timoteo    Do you need a refill? Yes    When did you use the medication last? daily    Patient offered an appointment? No    Do you have any questions or concerns?  No      Could we send this information to you in [facility] or would you prefer to receive a phone call?:   Patient would prefer a phone call   Okay to leave a detailed message?: Yes at Cell number on file:    Telephone Information:   Mobile [phone]

## 2024-12-18 ENCOUNTER — TELEPHONE (OUTPATIENT)
Dept: FAMILY MEDICINE | Facility: CLINIC | Age: 59
End: 2024-12-18
Payer: COMMERCIAL

## 2024-12-18 NOTE — TELEPHONE ENCOUNTER
Forms/Letter Request    Type of form/letter: OTHER: supplementary report       Do we have the form/letter: Yes: Patients son in law dropped of forms. Contacted patient through phone and got verbal consent     Who is the form from? Patient    Where did/will the form come from? Patient or family brought in       When is form/letter needed by: ASAP    How would you like the form/letter returned: Fax : [phone]    Patient Notified form requests are processed in 5-7 business days:Yes    Could we send this information to you in [facility] or would you prefer to receive a phone call?:   Patient would prefer a phone call   Okay to leave a detailed message?: Yes at Cell number on file:    Telephone Information:   Mobile [phone]

## 2024-12-26 ENCOUNTER — TELEPHONE (OUTPATIENT)
Dept: FAMILY MEDICINE | Facility: CLINIC | Age: 59
End: 2024-12-26
Payer: COMMERCIAL

## 2024-12-26 NOTE — TELEPHONE ENCOUNTER
Reason for call:  Forms     Who is the form from? Home care    Where did the form come from? form was faxed in    What clinic location was the form placed at? [place]    Where was the form placed?  Dr. Mahmood's Box/Folder    What number is listed as a contact on the form? [phone]    Kind of form: Eval and treat    Order number on form: [id number]

## 2024-12-28 ENCOUNTER — LAB REQUISITION (OUTPATIENT)
Dept: LAB | Facility: CLINIC | Age: 59
End: 2024-12-28
Payer: COMMERCIAL

## 2024-12-28 DIAGNOSIS — E05.20 THYROTOXICOSIS WITH TOXIC MULTINODULAR GOITER WITHOUT THYROTOXIC CRISIS OR STORM: ICD-10-CM

## 2024-12-28 LAB — TSH SERPL DL<=0.005 MIU/L-ACNC: 0.3 UIU/ML (ref 0.3–4.2)

## 2024-12-28 PROCEDURE — 84443 ASSAY THYROID STIM HORMONE: CPT | Mod: ORL | Performed by: FAMILY MEDICINE

## 2024-12-30 ENCOUNTER — TELEPHONE (OUTPATIENT)
Dept: FAMILY MEDICINE | Facility: CLINIC | Age: 59
End: 2024-12-30
Payer: COMMERCIAL

## 2024-12-30 NOTE — TELEPHONE ENCOUNTER
Reason for call:  Forms     Who is the form from? Home care    Where did the form come from? form was faxed in    What clinic location was the form placed at? [place]    Where was the form placed?  Dr. Mahmood's Box/Folder    What number is listed as a contact on the form? [phone]    Order number on form: [id number]

## 2024-12-31 ENCOUNTER — TELEPHONE (OUTPATIENT)
Dept: FAMILY MEDICINE | Facility: CLINIC | Age: 59
End: 2024-12-31
Payer: COMMERCIAL

## 2024-12-31 NOTE — TELEPHONE ENCOUNTER
Forms/Letter Request    Type of form/letter: OTHER: Standard written order        Do we have the form/letter: Yes: On the proivder's desk to be completed.    Who is the form from? M24.561 Contracture, right Knee. M24.562 Contracture, left knee.  Knee orth pros locking joint  (if other please explain)    Where did/will the form come from? form was faxed in    When is form/letter needed by: When it is completed by the provider.    How would you like the form/letter returned: Fax : [phone]    Patient Notified form requests are processed in 5-7 business days:Yes

## 2025-01-07 ENCOUNTER — MEDICAL CORRESPONDENCE (OUTPATIENT)
Dept: HEALTH INFORMATION MANAGEMENT | Facility: CLINIC | Age: 60
End: 2025-01-07

## 2025-01-13 DIAGNOSIS — G89.4 CHRONIC PAIN SYNDROME: ICD-10-CM

## 2025-01-13 RX ORDER — OXYCODONE AND ACETAMINOPHEN 5; 325 MG/1; MG/1
1-2 TABLET ORAL EVERY 6 HOURS PRN
Qty: 240 TABLET | Refills: 0 | Status: SHIPPED | OUTPATIENT
Start: 2025-01-13

## 2025-01-13 RX ORDER — FENTANYL 25 UG/1
1 PATCH TRANSDERMAL
Qty: 10 PATCH | Refills: 0 | Status: SHIPPED | OUTPATIENT
Start: 2025-01-13

## 2025-01-28 ENCOUNTER — MYC REFILL (OUTPATIENT)
Dept: FAMILY MEDICINE | Facility: CLINIC | Age: 60
End: 2025-01-28
Payer: COMMERCIAL

## 2025-01-28 DIAGNOSIS — F32.0 CURRENT MILD EPISODE OF MAJOR DEPRESSIVE DISORDER WITHOUT PRIOR EPISODE: ICD-10-CM

## 2025-01-28 RX ORDER — PAROXETINE 20 MG/1
20 TABLET, FILM COATED ORAL DAILY
Qty: 90 TABLET | Refills: 3 | Status: SHIPPED | OUTPATIENT
Start: 2025-01-28 | End: 2025-01-29

## 2025-01-29 ENCOUNTER — VIRTUAL VISIT (OUTPATIENT)
Dept: FAMILY MEDICINE | Facility: CLINIC | Age: 60
End: 2025-01-29
Payer: COMMERCIAL

## 2025-01-29 DIAGNOSIS — F32.0 CURRENT MILD EPISODE OF MAJOR DEPRESSIVE DISORDER WITHOUT PRIOR EPISODE: ICD-10-CM

## 2025-01-29 DIAGNOSIS — G82.20 PARAPLEGIA (H): Primary | ICD-10-CM

## 2025-01-29 DIAGNOSIS — Z20.828 EXPOSURE TO INFLUENZA: ICD-10-CM

## 2025-01-29 DIAGNOSIS — F41.9 ANXIETY: ICD-10-CM

## 2025-01-29 DIAGNOSIS — F11.10 OPIOID ABUSE, CONTINUOUS (H): ICD-10-CM

## 2025-01-29 DIAGNOSIS — G89.4 CHRONIC PAIN SYNDROME: ICD-10-CM

## 2025-01-29 PROCEDURE — 98006 SYNCH AUDIO-VIDEO EST MOD 30: CPT | Performed by: FAMILY MEDICINE

## 2025-01-29 RX ORDER — PAROXETINE 10 MG/1
10 TABLET, FILM COATED ORAL DAILY
Qty: 90 TABLET | Refills: 3 | Status: SHIPPED | OUTPATIENT
Start: 2025-01-29

## 2025-01-29 RX ORDER — OSELTAMIVIR PHOSPHATE 75 MG/1
75 CAPSULE ORAL DAILY
Qty: 7 CAPSULE | Refills: 0 | Status: SHIPPED | OUTPATIENT
Start: 2025-01-29 | End: 2025-02-05

## 2025-01-29 NOTE — LETTER

## 2025-01-29 NOTE — PROGRESS NOTES
Lacey is a 60 year old who is being evaluated via a billable video visit.    How would you like to obtain your AVS? MyChart  If the video visit is dropped, the invitation should be resent by: Text to cell phone: [phone]  Will anyone else be joining your video visit? No      Assessment & Plan     Paraparesis (Lower Extremities)  Paraparesis lower extremities stable.  Anticipate follow-up through this office in next 3 to 6 months ideally with chronic pain management ongoing.    Chronic Pain Syndrome  Patient continues current treatment with fentanyl 25 mcg every 72 hours as well as ongoing use of oxycodone acetaminophen 5/325 using 2 tablet 4 times daily with 240 tablets monthly.  PDMP website reviewed and appropriate.  Will complete urine drug screen at follow-up as well as CSA to be updated.    Current mild episode of major depressive disorder without prior episode  History of depression with anxiety.  Paroxetine had been cut back from 40 mg to 20 mg daily in the past and will agree to decreasing further to 10 mg daily dose and notify if symptomatic worsening.  May try discontinuing around March 1, 2025 if continued to do well.    Opioid abuse, continuous (H)  As above, continuous opioid dependence, stable.    Anxiety  Decrease paroxetine from 20 mg to 10 mg daily over the next 4 weeks and may discontinue around March 1 if continuing to do well.  - PARoxetine (PAXIL) 10 MG tablet  Dispense: 90 tablet; Refill: 3    Exposure to influenza  Influenza exposure and did provide Tamiflu 75 mg daily x 7 days for prophylaxis.  - oseltamivir (TAMIFLU) 75 MG capsule  Dispense: 7 capsule; Refill: 0        Paraparesis (Lower Extremities)  Paraplegia noted.  Awaiting PT evaluation tomorrow..  Requested leg braces through [facility].     Chronic pain syndrome  Chronic pain syndrome stable.  PDMP website reviewed.  Urine drug screen up-to-date.  CSA reviewed and will be signed by patient.  PHQ-9 questionnaire 2 out of 27 and MEENA-7  questionnaire 0 out of 21.  Anticipate follow-up in office in 6 months.     Current mild episode of major depressive disorder without prior episode (H)  Underlying depression stable with PHQ-9 questionnaire 2 out of 27 and continues benefits of paroxetine 20 mg daily.          Manjit Rahman is a 60 year old, presenting for the following health issues:  Recheck Medication and RECHECK        10/31/2024    11:12 AM   Additional Questions   Roomed by LH       Video Start Time: 11:58 AM    History of Present Illness       Reason for visit:  Question about thyroid or perhaps pist menopause symptoms    She eats 2-3 servings of fruits and vegetables daily.She consumes 1 sweetened beverage(s) daily.She exercises with enough effort to increase her heart rate 10 to 19 minutes per day.  She exercises with enough effort to increase her heart rate 3 or less days per week.   She is taking medications regularly.         Virtual visit completed today.  History of paraplegia historically.  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 Continues utilization of fentanyl patch 25 mcg every 72 hours as well as use of oxycodone acetaminophen 5/325 with continued use of 2 tablets 4 times daily on scheduled basis. Doing well with this and denies concerns currently for opioid-induced constipation etc. Mood has remained well controlled with paroxetine 40 mg daily with history of depression and anxiety. UTI prophylaxis with nitrofurantoin 50 mg daily without current concerns for recurrent UTI.  Cologuard testing negative on June 1, 2023.  Baclofen utilized.  PDMP website reviewed and appropriate.  Levothyroxine 137 mcg daily for thyroid replacement and does have upcoming home care visit in order to have repeat labs including base metabolic panel and TSH.  Oxybutynin 5 mg using 2 tablets up to 4 times daily.  Denies fevers or chills.  Still concern for feeling hot all the time.  Hair loss that  "comes on in clumps.  Similar to when she had elevated TSH however subsequent testing with TSH most recently 0.3 2024 and remains on levothyroxine 137 mcg daily.  Known history of osteoporosis.  Comprehensive review of systems as above otherwise all negative.         \"Claire\"   Single   1 son - Jed   1 daughter - Breyton (\"Brey\")   1 granddaughter - Dre   Mom - ( 4/22/15 per phone message) Jeff (she is an RN) - h/o R.A.   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 Dasilva at [facility]   13 PA approved for lyrica 75mg indefinitely               Review of Systems  Constitutional, HEENT, cardiovascular, pulmonary, GI, , musculoskeletal, neuro, skin, endocrine and psych systems are negative, except as otherwise noted.      Objective           Vitals:  No vitals were obtained today due to virtual visit.    Physical Exam   GENERAL: alert and no distress  EYES: Eyes grossly normal to inspection.  No discharge or erythema, or obvious scleral/conjunctival abnormalities.  RESP: No audible wheeze, cough, or visible cyanosis.    SKIN: Visible skin clear. No significant rash, abnormal pigmentation or lesions.  NEURO: Cranial nerves grossly intact.  Mentation and speech appropriate for age.  PSYCH: Appropriate " affect, tone, and pace of words          Video-Visit Details    Type of service:  Video Visit   Video End Time:12:15 PM  Originating Location (pt. Location): Home    Distant Location (provider location):  On-site  Platform used for Video Visit: Wilver  Signed Electronically by: Brayden Mahmood MD

## 2025-02-10 ENCOUNTER — MEDICAL CORRESPONDENCE (OUTPATIENT)
Dept: HEALTH INFORMATION MANAGEMENT | Facility: CLINIC | Age: 60
End: 2025-02-10

## 2025-02-12 ENCOUNTER — TELEPHONE (OUTPATIENT)
Dept: FAMILY MEDICINE | Facility: CLINIC | Age: 60
End: 2025-02-12
Payer: COMMERCIAL

## 2025-02-12 DIAGNOSIS — G89.4 CHRONIC PAIN SYNDROME: ICD-10-CM

## 2025-02-12 RX ORDER — OXYCODONE AND ACETAMINOPHEN 5; 325 MG/1; MG/1
1-2 TABLET ORAL EVERY 6 HOURS PRN
Qty: 240 TABLET | Refills: 0 | Status: SHIPPED | OUTPATIENT
Start: 2025-02-12

## 2025-02-12 RX ORDER — FENTANYL 25 UG/1
1 PATCH TRANSDERMAL
Qty: 10 PATCH | Refills: 0 | Status: SHIPPED | OUTPATIENT
Start: 2025-02-12

## 2025-02-12 NOTE — TELEPHONE ENCOUNTER
Medication Question or Refill    Contacts       Contact Date/Time Type Contact Phone/Fax    02/12/2025 09:24 AM CST Phone (Incoming) Jeb Lacey L (Self) [phone] (M)            What medication are you calling about (include dose and sig)?: oxyCODONE-acetaminophen (PERCOCET) 5-325 MG tablet and  fentaNYL (DURAGESIC) 25 mcg/hr 72 hr patch    [facility] Pharm[facility]:   [id number] [place] [address] [address] [place] [address] [address] [place] [address]  Phone: [phone] Fax: [phone]      Controlled Substance Agreement on file:   CSA -- Patient Level:     [Media Unavailable] Controlled Substance Agreement - Opioid - Scan on 12/18/2024  1:18 PM   [Media Unavailable] Controlled Substance Agreement - Opioid - Scan on 9/24/2019   [Media Unavailable] Controlled Substance Agreement - Opioid - Scan on 12/20/2018   [Media Unavailable] Controlled Substance Agreement - Opioid - Scan on 2/8/2016: CONTROLLED SUBSTANCE       Who prescribed the medication?: Brayden Mahmood MD     Do you need a refill? Yes.    When did you use the medication last? 2/12/2025    Patient offered an appointment? No    Do you have any questions or concerns?  No      Could we send this information to you in [facility] or would you prefer to receive a phone call?:   Patient would prefer a phone call   Okay to leave a detailed message?: Yes at Home number on file [phone] (home)

## 2025-02-14 ENCOUNTER — TELEPHONE (OUTPATIENT)
Dept: FAMILY MEDICINE | Facility: CLINIC | Age: 60
End: 2025-02-14
Payer: COMMERCIAL

## 2025-02-14 NOTE — TELEPHONE ENCOUNTER
Forms/Letter Request    Type of form/letter: Nursing Home/Assisted Living Orders      Do we have the form/letter: Yes: placed in Dr. Mahmood in box    Who is the form from? Home care    Where did/will the form come from? form was faxed in    When is form/letter needed by: when done    How would you like the form/letter returned: Fax : [phone]      Order details/form description: 6 pages    Order number (if applicable): [id number]

## 2025-02-18 NOTE — TELEPHONE ENCOUNTER
02/18/2025    Cherie @ [facility] returned call. TC relayed message. Nothing else needed at this time.    Lenore Braga

## 2025-03-12 ENCOUNTER — TELEPHONE (OUTPATIENT)
Dept: FAMILY MEDICINE | Facility: CLINIC | Age: 60
End: 2025-03-12
Payer: COMMERCIAL

## 2025-03-12 ENCOUNTER — MEDICAL CORRESPONDENCE (OUTPATIENT)
Dept: HEALTH INFORMATION MANAGEMENT | Facility: CLINIC | Age: 60
End: 2025-03-12
Payer: COMMERCIAL

## 2025-03-12 DIAGNOSIS — G89.4 CHRONIC PAIN SYNDROME: ICD-10-CM

## 2025-03-12 RX ORDER — FENTANYL 25 UG/1
1 PATCH TRANSDERMAL
Qty: 10 PATCH | Refills: 0 | Status: SHIPPED | OUTPATIENT
Start: 2025-03-12

## 2025-03-12 RX ORDER — OXYCODONE AND ACETAMINOPHEN 5; 325 MG/1; MG/1
1-2 TABLET ORAL EVERY 6 HOURS PRN
Qty: 240 TABLET | Refills: 0 | Status: SHIPPED | OUTPATIENT
Start: 2025-03-12

## 2025-03-12 NOTE — TELEPHONE ENCOUNTER
Refill Request  Medication name: Pending Prescriptions:                       Disp   Refills    oxyCODONE-acetaminophen (PERCOCET) 5-325 *240 ta*0            Sig: Take 1-2 tablets by mouth every 6 hours as needed           for moderate to severe pain.    fentaNYL (DURAGESIC) 25 mcg/hr 72 hr patch10 pat*0            Sig: Place 1 patch onto the skin every 72 hours. APPLY           1 PATCH ON THE SKIN EVERY 3RD DAY    Requested Pharmacy:  [facility] #[id number] [place] [address] [address] [facility]

## 2025-03-12 NOTE — TELEPHONE ENCOUNTER
Forms/Letter Request    Type of form/letter: Home Health Certification      Do we have the form/letter: Yes: placed in Dr. Mahmood in box    Who is the form from? Home care    Where did/will the form come from? form was faxed in    When is form/letter needed by: when done    How would you like the form/letter returned: Fax : [phone]      Order details/form description: 2 pages    Order number (if applicable): [id number]

## 2025-04-09 DIAGNOSIS — G89.4 CHRONIC PAIN SYNDROME: ICD-10-CM

## 2025-04-09 RX ORDER — OXYCODONE AND ACETAMINOPHEN 5; 325 MG/1; MG/1
1-2 TABLET ORAL EVERY 6 HOURS PRN
Qty: 240 TABLET | Refills: 0 | Status: SHIPPED | OUTPATIENT
Start: 2025-04-09

## 2025-04-09 RX ORDER — FENTANYL 25 UG/1
1 PATCH TRANSDERMAL
Qty: 10 PATCH | Refills: 0 | Status: SHIPPED | OUTPATIENT
Start: 2025-04-09

## 2025-04-16 DIAGNOSIS — R60.9 EDEMA, UNSPECIFIED TYPE: ICD-10-CM

## 2025-04-16 RX ORDER — FUROSEMIDE 20 MG/1
TABLET ORAL
Qty: 180 TABLET | Refills: 3 | Status: SHIPPED | OUTPATIENT
Start: 2025-04-16

## 2025-05-06 DIAGNOSIS — G89.4 CHRONIC PAIN SYNDROME: ICD-10-CM

## 2025-05-06 RX ORDER — OXYCODONE AND ACETAMINOPHEN 5; 325 MG/1; MG/1
1-2 TABLET ORAL EVERY 6 HOURS PRN
Qty: 240 TABLET | Refills: 0 | Status: SHIPPED | OUTPATIENT
Start: 2025-05-06

## 2025-05-06 RX ORDER — FENTANYL 25 UG/1
1 PATCH TRANSDERMAL
Qty: 10 PATCH | Refills: 0 | Status: SHIPPED | OUTPATIENT
Start: 2025-05-06

## 2025-06-02 DIAGNOSIS — G89.4 CHRONIC PAIN SYNDROME: ICD-10-CM

## 2025-06-02 RX ORDER — FENTANYL 25 UG/1
1 PATCH TRANSDERMAL
Qty: 10 PATCH | Refills: 0 | Status: SHIPPED | OUTPATIENT
Start: 2025-06-02

## 2025-06-02 RX ORDER — OXYCODONE AND ACETAMINOPHEN 5; 325 MG/1; MG/1
1-2 TABLET ORAL EVERY 6 HOURS PRN
Qty: 240 TABLET | Refills: 0 | Status: SHIPPED | OUTPATIENT
Start: 2025-06-02

## 2025-07-01 DIAGNOSIS — G89.4 CHRONIC PAIN SYNDROME: ICD-10-CM

## 2025-07-01 RX ORDER — FENTANYL 25 UG/1
1 PATCH TRANSDERMAL
Qty: 10 PATCH | Refills: 0 | Status: SHIPPED | OUTPATIENT
Start: 2025-07-01

## 2025-07-01 RX ORDER — OXYCODONE AND ACETAMINOPHEN 5; 325 MG/1; MG/1
1-2 TABLET ORAL EVERY 6 HOURS PRN
Qty: 240 TABLET | Refills: 0 | Status: SHIPPED | OUTPATIENT
Start: 2025-07-01

## 2025-07-23 DIAGNOSIS — E03.9 HYPOTHYROIDISM, UNSPECIFIED TYPE: ICD-10-CM

## 2025-07-23 RX ORDER — LEVOTHYROXINE SODIUM 137 UG/1
137 TABLET ORAL EVERY MORNING
Qty: 90 TABLET | Refills: 1 | OUTPATIENT
Start: 2025-07-23

## 2025-07-29 DIAGNOSIS — G89.4 CHRONIC PAIN SYNDROME: ICD-10-CM

## 2025-07-29 RX ORDER — OXYCODONE AND ACETAMINOPHEN 5; 325 MG/1; MG/1
1-2 TABLET ORAL EVERY 6 HOURS PRN
Qty: 240 TABLET | Refills: 0 | Status: SHIPPED | OUTPATIENT
Start: 2025-07-29

## 2025-07-29 RX ORDER — FENTANYL 25 UG/1
1 PATCH TRANSDERMAL
Qty: 10 PATCH | Refills: 0 | Status: SHIPPED | OUTPATIENT
Start: 2025-07-29

## 2025-08-09 ENCOUNTER — HEALTH MAINTENANCE LETTER (OUTPATIENT)
Age: 60
End: 2025-08-09

## 2025-08-25 DIAGNOSIS — G89.4 CHRONIC PAIN SYNDROME: ICD-10-CM

## 2025-08-25 RX ORDER — OXYCODONE AND ACETAMINOPHEN 5; 325 MG/1; MG/1
1-2 TABLET ORAL EVERY 6 HOURS PRN
Qty: 240 TABLET | Refills: 0 | Status: SHIPPED | OUTPATIENT
Start: 2025-08-25

## 2025-08-25 RX ORDER — FENTANYL 25 UG/1
1 PATCH TRANSDERMAL
Qty: 10 PATCH | Refills: 0 | Status: SHIPPED | OUTPATIENT
Start: 2025-08-25